# Patient Record
Sex: FEMALE | Race: WHITE | NOT HISPANIC OR LATINO | Employment: UNEMPLOYED | URBAN - METROPOLITAN AREA
[De-identification: names, ages, dates, MRNs, and addresses within clinical notes are randomized per-mention and may not be internally consistent; named-entity substitution may affect disease eponyms.]

---

## 2018-01-25 LAB — PAP RECOMMENDATION EXT: NORMAL

## 2018-12-05 ENCOUNTER — OFFICE VISIT (OUTPATIENT)
Dept: URGENT CARE | Facility: CLINIC | Age: 28
End: 2018-12-05
Payer: COMMERCIAL

## 2018-12-05 VITALS
WEIGHT: 150 LBS | SYSTOLIC BLOOD PRESSURE: 150 MMHG | BODY MASS INDEX: 26.58 KG/M2 | RESPIRATION RATE: 16 BRPM | HEIGHT: 63 IN | HEART RATE: 88 BPM | OXYGEN SATURATION: 99 % | DIASTOLIC BLOOD PRESSURE: 100 MMHG | TEMPERATURE: 98 F

## 2018-12-05 DIAGNOSIS — R19.7 NAUSEA VOMITING AND DIARRHEA: Primary | ICD-10-CM

## 2018-12-05 DIAGNOSIS — R11.2 NAUSEA VOMITING AND DIARRHEA: Primary | ICD-10-CM

## 2018-12-05 LAB
B-HCG UR QL: NEGATIVE
BILIRUB UR QL STRIP: NEGATIVE
CTP QC/QA: YES
GLUCOSE UR QL STRIP: NEGATIVE
KETONES UR QL STRIP: NEGATIVE
LEUKOCYTE ESTERASE UR QL STRIP: NEGATIVE
PH, POC UA: 8 (ref 5–8)
POC BLOOD, URINE: NEGATIVE
POC NITRATES, URINE: NEGATIVE
PROT UR QL STRIP: NEGATIVE
SP GR UR STRIP: 1.01 (ref 1–1.03)
UROBILINOGEN UR STRIP-ACNC: NORMAL (ref 0.1–1.1)

## 2018-12-05 PROCEDURE — 81003 URINALYSIS AUTO W/O SCOPE: CPT | Mod: QW,S$GLB,, | Performed by: NURSE PRACTITIONER

## 2018-12-05 PROCEDURE — 3008F BODY MASS INDEX DOCD: CPT | Mod: CPTII,S$GLB,, | Performed by: NURSE PRACTITIONER

## 2018-12-05 PROCEDURE — 99203 OFFICE O/P NEW LOW 30 MIN: CPT | Mod: 25,S$GLB,, | Performed by: NURSE PRACTITIONER

## 2018-12-05 PROCEDURE — 81025 URINE PREGNANCY TEST: CPT | Mod: S$GLB,,, | Performed by: NURSE PRACTITIONER

## 2018-12-05 RX ORDER — ONDANSETRON 8 MG/1
8 TABLET, ORALLY DISINTEGRATING ORAL
Status: COMPLETED | OUTPATIENT
Start: 2018-12-05 | End: 2018-12-05

## 2018-12-05 RX ORDER — SERTRALINE HYDROCHLORIDE 100 MG/1
25 TABLET, FILM COATED ORAL DAILY
COMMUNITY
End: 2019-06-26

## 2018-12-05 RX ORDER — ONDANSETRON 8 MG/1
8 TABLET, ORALLY DISINTEGRATING ORAL EVERY 6 HOURS PRN
Qty: 12 TABLET | Refills: 0 | Status: SHIPPED | OUTPATIENT
Start: 2018-12-05 | End: 2019-06-26

## 2018-12-05 RX ORDER — DICYCLOMINE HYDROCHLORIDE 20 MG/1
20 TABLET ORAL 3 TIMES DAILY PRN
Qty: 12 TABLET | Refills: 0 | Status: SHIPPED | OUTPATIENT
Start: 2018-12-05 | End: 2019-06-26

## 2018-12-05 RX ADMIN — ONDANSETRON 8 MG: 8 TABLET, ORALLY DISINTEGRATING ORAL at 10:12

## 2018-12-05 NOTE — PROGRESS NOTES
"Subjective:       Patient ID: Esther Romo is a 28 y.o. female.    Vitals:  height is 5' 3" (1.6 m) and weight is 68 kg (150 lb). Her tympanic temperature is 98.1 °F (36.7 °C). Her blood pressure is 150/100 (abnormal) and her pulse is 88. Her respiration is 16 and oxygen saturation is 99%.     Chief Complaint: Nausea    Patient presents with c/o nausea, vomiting, and diarrhea. Symptoms started Monday night. No blood in vomit or diarrhea. No urinary complaints. Patient with abdominal pain during bowel movements, has not taken any OTC meds for symptoms. Pt states hx of HTN, has not taken BP meds in 2 days due to nausea. Pt here requesting meds for nausea so she can take her BP meds and go back to work tomorrow. Denies headache, chest pain, SOB, weakness, changes in vision and dizziness.     + improvement in symptoms this morning per pt. She was able to drive herself here without any N/V/D or other complications.       Nausea   This is a new problem. The current episode started in the past 7 days (Monday). The problem occurs constantly. The problem has been unchanged. Associated symptoms include abdominal pain, a change in bowel habit, chills, nausea and vomiting. Pertinent negatives include no chest pain, diaphoresis, fever, myalgias, urinary symptoms, visual change or weakness. Nothing aggravates the symptoms. She has tried nothing for the symptoms. The treatment provided moderate relief.       Constitution: Positive for appetite change and chills. Negative for sweating and fever.   HENT: Negative for trouble swallowing.    Cardiovascular: Negative for chest pain.   Respiratory: Negative for shortness of breath.    Gastrointestinal: Positive for abdominal pain, nausea, vomiting and diarrhea. Negative for abdominal trauma, abdominal bloating, history of abdominal surgery, constipation, dark colored stools and heartburn.   Genitourinary: Negative for dysuria, missed menses and pelvic pain.   Musculoskeletal: " Negative for back pain and muscle ache.       Objective:      Physical Exam   Constitutional: She is oriented to person, place, and time. She appears well-developed and well-nourished.   HENT:   Head: Normocephalic and atraumatic.   Right Ear: External ear normal.   Left Ear: External ear normal.   Nose: Nose normal.   Mouth/Throat: Mucous membranes are normal.   Eyes: Conjunctivae and lids are normal.   Neck: Trachea normal and full passive range of motion without pain. Neck supple.   Cardiovascular: Normal rate, regular rhythm and normal heart sounds.   Pulmonary/Chest: Effort normal and breath sounds normal. No respiratory distress.   Abdominal: Soft. Normal appearance and bowel sounds are normal. She exhibits no distension, no abdominal bruit, no pulsatile midline mass and no mass. There is no tenderness.   Musculoskeletal: Normal range of motion. She exhibits no edema.   Neurological: She is alert and oriented to person, place, and time. She has normal strength.   Skin: Skin is warm, dry and intact. She is not diaphoretic. No pallor.   Psychiatric: She has a normal mood and affect. Her speech is normal and behavior is normal. Judgment and thought content normal. Cognition and memory are normal.   Nursing note and vitals reviewed.      Assessment:       1. Nausea vomiting and diarrhea        Plan:         Nausea vomiting and diarrhea  -     POCT Urinalysis, Dipstick, Automated, W/O Scope  -     POCT urine pregnancy  -     ondansetron disintegrating tablet 8 mg  -     ondansetron (ZOFRAN-ODT) 8 MG TbDL; Take 1 tablet (8 mg total) by mouth every 6 (six) hours as needed.  Dispense: 12 tablet; Refill: 0  -     dicyclomine (BENTYL) 20 mg tablet; Take 1 tablet (20 mg total) by mouth 3 (three) times daily as needed.  Dispense: 12 tablet; Refill: 0

## 2018-12-05 NOTE — PATIENT INSTRUCTIONS
Diet for Vomiting or Diarrhea (Adult)    Your symptoms may return or get worse after eating certain foods listed below. If this happens, stop eating these foods until your symptoms ease and you feel better.  Once the vomiting stops, follow the steps below.   During the first 12 to 24 hours  During the first 12 to 24 hours, follow this diet:  · Drinks. Plain water, sport drinks like electrolyte solutions, soft drinks without caffeine, mineral water (plain or flavored), clear fruit juices, and decaffeinated tea and coffee.  · Soups. Clear broth.  · Desserts. Plain gelatin, popsicles, and fruit juice bars. As you feel better, you may add 6 to 8 ounces of yogurt per day. If you have diarrhea, don't have foods or drinks that contain sugar, high-fructose corn syrup, or sugar alcohols.  During the next 24 hours  During the next 24 hours you may add the following to the above:  · Hot cereal, plain toast, bread, rolls, and crackers  · Plain noodles, rice, mashed potatoes, and chicken noodle or rice soup  · Unsweetened canned fruit (but not pineapple) and bananas  Don't eat more than 15 grams of fat a day. Do this by staying away from margarine, butter, oils, mayonnaise, sauces, gravies, fried foods, peanut butter, meat, poultry, and fish.  Don't eat much fiber. Stay away from raw or cooked vegetables, fresh fruits (except bananas), and bran cereals.  Limit how much caffeine and chocolate you have. Do not use any spices or seasonings except salt.  During the next 24 hours  Slowly go back to your normal diet, as you feel better and your symptoms ease.  Date Last Reviewed: 8/1/2016  © 0701-9028 [employer] [address] [place] [address], [place] [address]. All rights reserved. This information is not intended as a substitute for professional medical care. Always follow your healthcare professional's instructions.

## 2018-12-05 NOTE — LETTER
December 5, 2018      [facility]  [address] [address]  Phone: [phone]  Fax: [phone]       Patient: Esther Romo   YOB: 1990  Date of Visit: 12/05/2018    To Whom It May Concern:    Aleta Romo  was at [facility] on 12/05/2018. Please note that her illness started on Monday, December 4, 2018. She may return to work/school on 12/06/18 without restrictions. If you have any questions or concerns, or if I can be of further assistance, please do not hesitate to contact me.    Sincerely,    Yamilet DODD MA

## 2019-04-07 ENCOUNTER — HOSPITAL ENCOUNTER (EMERGENCY)
Facility: OTHER | Age: 29
Discharge: HOME | End: 2019-04-08
Attending: EMERGENCY MEDICINE

## 2019-04-07 DIAGNOSIS — S00.83XA FACIAL CONTUSION, INITIAL ENCOUNTER: Primary | ICD-10-CM

## 2019-04-07 LAB
B-HCG UR QL: NEGATIVE
CTP QC/QA: YES

## 2019-04-07 PROCEDURE — 81025 URINE PREGNANCY TEST: CPT | Performed by: PHYSICIAN ASSISTANT

## 2019-04-07 PROCEDURE — 99284 EMERGENCY DEPT VISIT MOD MDM: CPT | Mod: 25

## 2019-04-08 VITALS
TEMPERATURE: 98 F | RESPIRATION RATE: 16 BRPM | OXYGEN SATURATION: 98 % | WEIGHT: 150 LBS | BODY MASS INDEX: 26.58 KG/M2 | SYSTOLIC BLOOD PRESSURE: 136 MMHG | HEIGHT: 63 IN | HEART RATE: 106 BPM | DIASTOLIC BLOOD PRESSURE: 84 MMHG
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
## 2019-05-23 ENCOUNTER — HOSPITAL ENCOUNTER (EMERGENCY)
Facility: HOSPITAL | Age: 29
Discharge: HOME | End: 2019-05-24
Attending: FAMILY MEDICINE

## 2019-05-23 DIAGNOSIS — F10.930 ALCOHOL WITHDRAWAL SYNDROME WITHOUT COMPLICATION: Primary | ICD-10-CM

## 2019-05-23 DIAGNOSIS — R00.0 TACHYCARDIA: ICD-10-CM

## 2019-05-23 LAB
ALBUMIN SERPL BCP-MCNC: 4.2 G/DL (ref 3.5–5.2)
ALP SERPL-CCNC: 76 U/L (ref 55–135)
ALT SERPL W/O P-5'-P-CCNC: 55 U/L (ref 10–44)
ANION GAP SERPL CALC-SCNC: 18 MMOL/L (ref 8–16)
AST SERPL-CCNC: 85 U/L (ref 10–40)
BASOPHILS # BLD AUTO: 0.02 K/UL (ref 0–0.2)
BASOPHILS NFR BLD: 0.4 % (ref 0–1.9)
BILIRUB SERPL-MCNC: 1 MG/DL (ref 0.1–1)
BUN SERPL-MCNC: 8 MG/DL (ref 6–20)
CALCIUM SERPL-MCNC: 9.3 MG/DL (ref 8.7–10.5)
CHLORIDE SERPL-SCNC: 97 MMOL/L (ref 95–110)
CO2 SERPL-SCNC: 20 MMOL/L (ref 23–29)
CREAT SERPL-MCNC: 0.7 MG/DL (ref 0.5–1.4)
DIFFERENTIAL METHOD: ABNORMAL
EOSINOPHIL # BLD AUTO: 0 K/UL (ref 0–0.5)
EOSINOPHIL NFR BLD: 0.2 % (ref 0–8)
ERYTHROCYTE [DISTWIDTH] IN BLOOD BY AUTOMATED COUNT: 14.6 % (ref 11.5–14.5)
EST. GFR  (AFRICAN AMERICAN): >60 ML/MIN/1.73 M^2
EST. GFR  (NON AFRICAN AMERICAN): >60 ML/MIN/1.73 M^2
ETHANOL SERPL-MCNC: 118 MG/DL
GLUCOSE SERPL-MCNC: 98 MG/DL (ref 70–110)
HCT VFR BLD AUTO: 37.7 % (ref 37–48.5)
HGB BLD-MCNC: 13.2 G/DL (ref 12–16)
LIPASE SERPL-CCNC: 49 U/L (ref 4–60)
LYMPHOCYTES # BLD AUTO: 0.8 K/UL (ref 1–4.8)
LYMPHOCYTES NFR BLD: 14.3 % (ref 18–48)
MAGNESIUM SERPL-MCNC: 1.4 MG/DL (ref 1.6–2.6)
MCH RBC QN AUTO: 30.8 PG (ref 27–31)
MCHC RBC AUTO-ENTMCNC: 35 G/DL (ref 32–36)
MCV RBC AUTO: 88 FL (ref 82–98)
MONOCYTES # BLD AUTO: 0.5 K/UL (ref 0.3–1)
MONOCYTES NFR BLD: 9.5 % (ref 4–15)
NEUTROPHILS # BLD AUTO: 4.1 K/UL (ref 1.8–7.7)
NEUTROPHILS NFR BLD: 75.6 % (ref 38–73)
PLATELET # BLD AUTO: 142 K/UL (ref 150–350)
PMV BLD AUTO: 9.6 FL (ref 9.2–12.9)
POTASSIUM SERPL-SCNC: 3.4 MMOL/L (ref 3.5–5.1)
PROT SERPL-MCNC: 7.8 G/DL (ref 6–8.4)
RBC # BLD AUTO: 4.28 M/UL (ref 4–5.4)
SODIUM SERPL-SCNC: 135 MMOL/L (ref 136–145)
WBC # BLD AUTO: 5.47 K/UL (ref 3.9–12.7)

## 2019-05-23 PROCEDURE — 96375 TX/PRO/DX INJ NEW DRUG ADDON: CPT

## 2019-05-23 PROCEDURE — 93010 EKG 12-LEAD: ICD-10-PCS | Mod: ,,, | Performed by: INTERNAL MEDICINE

## 2019-05-23 PROCEDURE — 86703 HIV-1/HIV-2 1 RESULT ANTBDY: CPT

## 2019-05-23 PROCEDURE — 93005 ELECTROCARDIOGRAM TRACING: CPT

## 2019-05-23 PROCEDURE — 83690 ASSAY OF LIPASE: CPT

## 2019-05-23 PROCEDURE — 80307 DRUG TEST PRSMV CHEM ANLYZR: CPT

## 2019-05-23 PROCEDURE — 63600175 PHARM REV CODE 636 W HCPCS: Performed by: FAMILY MEDICINE

## 2019-05-23 PROCEDURE — 96376 TX/PRO/DX INJ SAME DRUG ADON: CPT

## 2019-05-23 PROCEDURE — 99291 CRITICAL CARE FIRST HOUR: CPT

## 2019-05-23 PROCEDURE — 83735 ASSAY OF MAGNESIUM: CPT

## 2019-05-23 PROCEDURE — 80320 DRUG SCREEN QUANTALCOHOLS: CPT

## 2019-05-23 PROCEDURE — 80053 COMPREHEN METABOLIC PANEL: CPT

## 2019-05-23 PROCEDURE — 84484 ASSAY OF TROPONIN QUANT: CPT

## 2019-05-23 PROCEDURE — 85025 COMPLETE CBC W/AUTO DIFF WBC: CPT

## 2019-05-23 PROCEDURE — 81000 URINALYSIS NONAUTO W/SCOPE: CPT | Mod: 59

## 2019-05-23 PROCEDURE — 96361 HYDRATE IV INFUSION ADD-ON: CPT

## 2019-05-23 PROCEDURE — 93010 ELECTROCARDIOGRAM REPORT: CPT | Mod: ,,, | Performed by: INTERNAL MEDICINE

## 2019-05-23 PROCEDURE — 25000003 PHARM REV CODE 250: Performed by: FAMILY MEDICINE

## 2019-05-23 RX ORDER — THIAMINE HCL 100 MG
100 TABLET ORAL ONCE
Status: COMPLETED | OUTPATIENT
Start: 2019-05-23 | End: 2019-05-23

## 2019-05-23 RX ORDER — THIAMINE HCL 100 MG
100 TABLET ORAL DAILY
Status: DISCONTINUED | OUTPATIENT
Start: 2019-05-24 | End: 2019-05-23

## 2019-05-23 RX ORDER — ONDANSETRON 2 MG/ML
8 INJECTION INTRAMUSCULAR; INTRAVENOUS
Status: COMPLETED | OUTPATIENT
Start: 2019-05-23 | End: 2019-05-23

## 2019-05-23 RX ORDER — SODIUM CHLORIDE 9 MG/ML
1000 INJECTION, SOLUTION INTRAVENOUS
Status: COMPLETED | OUTPATIENT
Start: 2019-05-23 | End: 2019-05-24

## 2019-05-23 RX ADMIN — LORAZEPAM 1 MG: 2 INJECTION INTRAMUSCULAR; INTRAVENOUS at 10:05

## 2019-05-23 RX ADMIN — LORAZEPAM 1 MG: 2 INJECTION INTRAMUSCULAR; INTRAVENOUS at 11:05

## 2019-05-23 RX ADMIN — Medication 100 MG: at 10:05

## 2019-05-23 RX ADMIN — SODIUM CHLORIDE 1000 ML: 0.9 INJECTION, SOLUTION INTRAVENOUS at 10:05

## 2019-05-23 RX ADMIN — ONDANSETRON 8 MG: 2 INJECTION INTRAMUSCULAR; INTRAVENOUS at 11:05

## 2019-05-24 VITALS
HEIGHT: 63 IN | SYSTOLIC BLOOD PRESSURE: 134 MMHG | OXYGEN SATURATION: 95 % | TEMPERATURE: 99 F | RESPIRATION RATE: 18 BRPM | WEIGHT: 162.5 LBS | HEART RATE: 122 BPM | BODY MASS INDEX: 28.79 KG/M2 | DIASTOLIC BLOOD PRESSURE: 69 MMHG

## 2019-05-24 LAB
AMPHET+METHAMPHET UR QL: NEGATIVE
BACTERIA #/AREA URNS HPF: ABNORMAL /HPF
BARBITURATES UR QL SCN>200 NG/ML: NEGATIVE
BENZODIAZ UR QL SCN>200 NG/ML: NEGATIVE
BILIRUB UR QL STRIP: NEGATIVE
BZE UR QL SCN: NEGATIVE
CANNABINOIDS UR QL SCN: NEGATIVE
CLARITY UR: CLEAR
COLOR UR: YELLOW
CREAT UR-MCNC: 82.4 MG/DL (ref 15–325)
GLUCOSE UR QL STRIP: NEGATIVE
HGB UR QL STRIP: ABNORMAL
HIV 1+2 AB+HIV1 P24 AG SERPL QL IA: NEGATIVE
HYALINE CASTS #/AREA URNS LPF: 0 /LPF
KETONES UR QL STRIP: ABNORMAL
LEUKOCYTE ESTERASE UR QL STRIP: NEGATIVE
METHADONE UR QL SCN>300 NG/ML: NEGATIVE
MICROSCOPIC COMMENT: ABNORMAL
NITRITE UR QL STRIP: NEGATIVE
OPIATES UR QL SCN: NEGATIVE
PCP UR QL SCN>25 NG/ML: NEGATIVE
PH UR STRIP: 7 [PH] (ref 5–8)
PROT UR QL STRIP: ABNORMAL
RBC #/AREA URNS HPF: 5 /HPF (ref 0–4)
SP GR UR STRIP: 1.01 (ref 1–1.03)
TOXICOLOGY INFORMATION: NORMAL
TROPONIN I SERPL DL<=0.01 NG/ML-MCNC: 0.01 NG/ML (ref 0–0.03)
URN SPEC COLLECT METH UR: ABNORMAL
UROBILINOGEN UR STRIP-ACNC: NEGATIVE EU/DL
WBC #/AREA URNS HPF: 1 /HPF (ref 0–5)

## 2019-05-24 PROCEDURE — 63600175 PHARM REV CODE 636 W HCPCS: Performed by: FAMILY MEDICINE

## 2019-05-24 PROCEDURE — 25000003 PHARM REV CODE 250: Performed by: FAMILY MEDICINE

## 2019-05-24 PROCEDURE — 96376 TX/PRO/DX INJ SAME DRUG ADON: CPT

## 2019-05-24 PROCEDURE — 96365 THER/PROPH/DIAG IV INF INIT: CPT

## 2019-05-24 RX ORDER — MAGNESIUM SULFATE HEPTAHYDRATE 40 MG/ML
2 INJECTION, SOLUTION INTRAVENOUS
Status: COMPLETED | OUTPATIENT
Start: 2019-05-24 | End: 2019-05-24

## 2019-05-24 RX ORDER — ONDANSETRON 4 MG/1
4 TABLET, FILM COATED ORAL EVERY 6 HOURS
Qty: 28 TABLET | Refills: 0 | Status: SHIPPED | OUTPATIENT
Start: 2019-05-24 | End: 2019-05-31

## 2019-05-24 RX ORDER — PANTOPRAZOLE SODIUM 40 MG/10ML
80 INJECTION, POWDER, LYOPHILIZED, FOR SOLUTION INTRAVENOUS ONCE
Status: DISCONTINUED | OUTPATIENT
Start: 2019-05-24 | End: 2019-05-24

## 2019-05-24 RX ORDER — CHLORDIAZEPOXIDE HYDROCHLORIDE 10 MG/1
CAPSULE, GELATIN COATED ORAL
Qty: 36 CAPSULE | Refills: 0 | Status: ON HOLD | OUTPATIENT
Start: 2019-05-24 | End: 2019-06-27 | Stop reason: ALTCHOICE

## 2019-05-24 RX ADMIN — LORAZEPAM 2 MG: 2 INJECTION INTRAMUSCULAR; INTRAVENOUS at 12:05

## 2019-05-24 RX ADMIN — PROMETHAZINE HYDROCHLORIDE 25 MG: 25 INJECTION INTRAMUSCULAR; INTRAVENOUS at 12:05

## 2019-05-24 RX ADMIN — LORAZEPAM 2 MG: 2 INJECTION INTRAMUSCULAR; INTRAVENOUS at 02:05

## 2019-05-24 RX ADMIN — MAGNESIUM SULFATE 2 G: 2 INJECTION INTRAVENOUS at 12:05

## 2019-05-24 NOTE — ED PROVIDER NOTES
"SCRIBE #1 NOTE: I, Akiko Lucia, am scribing for, and in the presence of, Hoa Granados MD. I have scribed the entire note.       History     Chief Complaint   Patient presents with    Withdrawal     from alcohol. c/o fast heart beat, abdominal pain, N/V. last drink 12 hrs ago.      Review of patient's allergies indicates:  No Known Allergies      History of Present Illness     HPI    5/23/2019, 10:20 PM  History obtained from the patient      History of Present Illness: Esther Romo is a 29 y.o. female patient with a PMHx of alcohol abuse and HTN who presents to the Emergency Department for evaluation of alcohol withdrawal which onset gradually x1 week ago. Pt states that for the last week she has been trying to drink less alcohol. She reports drinking 3/4 of a fifth of whiskey every day of this week except today. Pt reports that her last drink was 12 hours and she had "one swig" of whiskey.  Pt notes that she has experienced withdrawal in the past. She notes that she normally "chokes down a bottle of liquor" to rid the sxs, but decided to come to the ED for this episode. This is pt's first time to ED for sxs. Symptoms are constant and moderate in severity. No mitigating or exacerbating factors reported. Associated sxs include diaphoresis, palpitations, tremors, n/v and abd pain. Patient denies any leg swelling, SI, HI, hallucinations, seizures, fever, chills, numbness, weakness, and all other sxs at this time. No prior tx reportedNo further complaints or concerns at this time.       Arrival mode: Personal vehicle     PCP: Primary Doctor No        Past Medical History:  Past Medical History:   Diagnosis Date    Anxiety     Hypertension     Miscarriage        Past Surgical History:  Past Surgical History:   Procedure Laterality Date    BREAST LUMPECTOMY           Family History:  Family History   Problem Relation Age of Onset    No Known Problems Mother     No Known Problems Father        Social " "History:  Social History     Tobacco Use    Smoking status: Current Every Day Smoker     Packs/day: 0.50     Types: Cigarettes    Smokeless tobacco: Never Used   Substance and Sexual Activity    Alcohol use: Yes     Comment: "I'm an alcoholic"    Drug use: Yes     Types: Cocaine, Marijuana     Comment: ocassionally    Sexual activity: N/A        Review of Systems     Review of Systems   Constitutional: Positive for diaphoresis. Negative for chills and fever.   HENT: Negative for sore throat.    Respiratory: Positive for shortness of breath.    Cardiovascular: Positive for chest pain and palpitations. Negative for leg swelling.   Gastrointestinal: Positive for abdominal pain, nausea and vomiting.   Genitourinary: Negative for dysuria.   Musculoskeletal: Negative for back pain.   Skin: Negative for rash.   Neurological: Positive for tremors. Negative for seizures, weakness and numbness.             Hematological: Does not bruise/bleed easily.   Psychiatric/Behavioral: Negative for hallucinations and suicidal ideas.        (+) alcohol withdrawal   (+) alcohol abuse   (-) HI    All other systems reviewed and are negative.     Physical Exam     Initial Vitals [05/23/19 2039]   BP Pulse Resp Temp SpO2   125/74 (!) 126 18 98.6 °F (37 °C) 97 %      MAP       --          Physical Exam  Nursing Notes and Vital Signs Reviewed.  Constitutional: Patient is in mild distress. Visibly trembling. Diaphoretic. Well-developed and well-nourished.  Head: Atraumatic. Normocephalic.  Eyes: PERRL. EOM intact. Conjunctivae are not pale. No scleral icterus.  ENT: Mucous membranes are moist. Oropharynx is clear and symmetric.    Neck: Supple. Full ROM. No lymphadenopathy.  Cardiovascular: Tachycardic. Regular rhythm. No murmurs, rubs, or gallops. Distal pulses are 2+ and symmetric.  Pulmonary/Chest: No respiratory distress. Clear to auscultation bilaterally. No wheezing or rales.  Abdominal: Soft and non-distended.  There is no " "tenderness.  No rebound, guarding, or rigidity. Good bowel sounds.  Genitourinary: No CVA tenderness  Musculoskeletal: Moves all extremities. No obvious deformities. No edema. No calf tenderness.  Skin: Warm and dry.  Neurological:  AAOx3.  Normal speech.  No acute focal neurological deficits are appreciated.  Psychiatric: Normal affect. Good eye contact. Appropriate in content.     ED Course   Critical Care  Date/Time: 5/24/2019 12:22 AM  Performed by: Hoa Granados MD  Authorized by: Hoa Granados MD   Direct patient critical care time: 15 minutes  Additional history critical care time: 10 minutes  Ordering / reviewing critical care time: 10 minutes  Documentation critical care time: 10 minutes  Total critical care time (exclusive of procedural time) : 45 minutes  Critical care time was exclusive of separately billable procedures and treating other patients and teaching time.  Critical care was necessary to treat or prevent imminent or life-threatening deterioration of the following conditions: alcohol withdrawal   Critical care was time spent personally by me on the following activities: blood draw for specimens, development of treatment plan with patient or surrogate, discussions with consultants, interpretation of cardiac output measurements, evaluation of patient's response to treatment, examination of patient, obtaining history from patient or surrogate, ordering and performing treatments and interventions, ordering and review of laboratory studies, pulse oximetry, re-evaluation of patient's condition and review of old charts.        ED Vital Signs:  Vitals:    05/23/19 2039 05/23/19 2142 05/23/19 2143 05/23/19 2242   BP: 125/74 139/74  137/75   Pulse: (!) 126  (!) 120 (!) 113   Resp: 18   19   Temp: 98.6 °F (37 °C)      TempSrc: Oral      SpO2: 97%  100% 100%   Weight: 73.7 kg (162 lb 7.7 oz)      Height: 5' 3" (1.6 m)       05/24/19 0002 05/24/19 0039 05/24/19 0113 05/24/19 0157   BP: 127/75 133/88 " 134/78    Pulse: (!) 114 (!) 135 (!) 114 (!) 118   Resp: 19 20 19   Temp:       TempSrc:       SpO2: 98% 99% 95% 95%   Weight:       Height:        05/24/19 0200 05/24/19 0228   BP: 134/73 134/69   Pulse: (!) 116 (!) 122   Resp: 19 18   Temp: 98.5 °F (36.9 °C) 98.5 °F (36.9 °C)   TempSrc: Oral Oral   SpO2: 95% 95%   Weight:     Height:         Abnormal Lab Results:  Labs Reviewed   CBC W/ AUTO DIFFERENTIAL - Abnormal; Notable for the following components:       Result Value    RDW 14.6 (*)     Platelets 142 (*)     Lymph # 0.8 (*)     Gran% 75.6 (*)     Lymph% 14.3 (*)     All other components within normal limits   COMPREHENSIVE METABOLIC PANEL - Abnormal; Notable for the following components:    Sodium 135 (*)     Potassium 3.4 (*)     CO2 20 (*)     AST 85 (*)     ALT 55 (*)     Anion Gap 18 (*)     All other components within normal limits   MAGNESIUM - Abnormal; Notable for the following components:    Magnesium 1.4 (*)     All other components within normal limits   URINALYSIS, REFLEX TO URINE CULTURE - Abnormal; Notable for the following components:    Protein, UA 1+ (*)     Ketones, UA 1+ (*)     Occult Blood UA 3+ (*)     All other components within normal limits    Narrative:     Preferred Collection Type->Urine, Clean Catch   ALCOHOL,MEDICAL (ETHANOL) - Abnormal; Notable for the following components:    Alcohol, Medical, Serum 118 (*)     All other components within normal limits   URINALYSIS MICROSCOPIC - Abnormal; Notable for the following components:    RBC, UA 5 (*)     All other components within normal limits    Narrative:     Preferred Collection Type->Urine, Clean Catch   HIV 1 / 2 ANTIBODY   DRUG SCREEN PANEL, URINE EMERGENCY    Narrative:     Preferred Collection Type->Urine, Clean Catch   LIPASE   TROPONIN I   TROPONIN I        All Lab Results:  Results for orders placed or performed during the hospital encounter of 05/23/19   HIV 1/2 Ag/Ab (4th Gen)   Result Value Ref Range    HIV 1/2 Ag/Ab  Negative Negative   CBC auto differential   Result Value Ref Range    WBC 5.47 3.90 - 12.70 K/uL    RBC 4.28 4.00 - 5.40 M/uL    Hemoglobin 13.2 12.0 - 16.0 g/dL    Hematocrit 37.7 37.0 - 48.5 %    Mean Corpuscular Volume 88 82 - 98 fL    Mean Corpuscular Hemoglobin 30.8 27.0 - 31.0 pg    Mean Corpuscular Hemoglobin Conc 35.0 32.0 - 36.0 g/dL    RDW 14.6 (H) 11.5 - 14.5 %    Platelets 142 (L) 150 - 350 K/uL    MPV 9.6 9.2 - 12.9 fL    Gran # (ANC) 4.1 1.8 - 7.7 K/uL    Lymph # 0.8 (L) 1.0 - 4.8 K/uL    Mono # 0.5 0.3 - 1.0 K/uL    Eos # 0.0 0.0 - 0.5 K/uL    Baso # 0.02 0.00 - 0.20 K/uL    Gran% 75.6 (H) 38.0 - 73.0 %    Lymph% 14.3 (L) 18.0 - 48.0 %    Mono% 9.5 4.0 - 15.0 %    Eosinophil% 0.2 0.0 - 8.0 %    Basophil% 0.4 0.0 - 1.9 %    Differential Method Automated    Comprehensive metabolic panel   Result Value Ref Range    Sodium 135 (L) 136 - 145 mmol/L    Potassium 3.4 (L) 3.5 - 5.1 mmol/L    Chloride 97 95 - 110 mmol/L    CO2 20 (L) 23 - 29 mmol/L    Glucose 98 70 - 110 mg/dL    BUN, Bld 8 6 - 20 mg/dL    Creatinine 0.7 0.5 - 1.4 mg/dL    Calcium 9.3 8.7 - 10.5 mg/dL    Total Protein 7.8 6.0 - 8.4 g/dL    Albumin 4.2 3.5 - 5.2 g/dL    Total Bilirubin 1.0 0.1 - 1.0 mg/dL    Alkaline Phosphatase 76 55 - 135 U/L    AST 85 (H) 10 - 40 U/L    ALT 55 (H) 10 - 44 U/L    Anion Gap 18 (H) 8 - 16 mmol/L    eGFR if African American >60 >60 mL/min/1.73 m^2    eGFR if non African American >60 >60 mL/min/1.73 m^2   Drug screen panel, emergency   Result Value Ref Range    Benzodiazepines Negative     Methadone metabolites Negative     Cocaine (Metab.) Negative     Opiate Scrn, Ur Negative     Barbiturate Screen, Ur Negative     Amphetamine Screen, Ur Negative     THC Negative     Phencyclidine Negative     Creatinine, Random Ur 82.4 15.0 - 325.0 mg/dL    Toxicology Information SEE COMMENT    Magnesium   Result Value Ref Range    Magnesium 1.4 (L) 1.6 - 2.6 mg/dL   Urinalysis, Reflex to Urine Culture Urine, Clean Catch    Result Value Ref Range    Specimen UA Urine, Clean Catch     Color, UA Yellow Yellow, Straw, Rose Mary    Appearance, UA Clear Clear    pH, UA 7.0 5.0 - 8.0    Specific Gravity, UA 1.015 1.005 - 1.030    Protein, UA 1+ (A) Negative    Glucose, UA Negative Negative    Ketones, UA 1+ (A) Negative    Bilirubin (UA) Negative Negative    Occult Blood UA 3+ (A) Negative    Nitrite, UA Negative Negative    Urobilinogen, UA Negative <2.0 EU/dL    Leukocytes, UA Negative Negative   Ethanol   Result Value Ref Range    Alcohol, Medical, Serum 118 (H) <10 mg/dL   Lipase   Result Value Ref Range    Lipase 49 4 - 60 U/L   Urinalysis Microscopic   Result Value Ref Range    RBC, UA 5 (H) 0 - 4 /hpf    WBC, UA 1 0 - 5 /hpf    Bacteria Rare None-Occ /hpf    Hyaline Casts, UA 0 0-1/lpf /lpf    Microscopic Comment SEE COMMENT    Troponin I   Result Value Ref Range    Troponin I 0.013 0.000 - 0.026 ng/mL         Imaging Results:  Imaging Results    None          The EKG was ordered, reviewed, and independently interpreted by the ED provider.  Interpretation time: 2158  Rate: 111 BPM  Rhythm: sinus tachycardia  Interpretation: Nonspecific T wave abnormality. No STEMI.           ED Course as of May 25 1057   Fri May 24, 2019   0022 Re-evaluation patient:  Patient's tachycardia is improving.  Patient has received 2 doses of Ativan.  At patient's family is at bedside have discussed option and offered admission versus being discharged home with a Librium taper.  Patient states that she has taken that before and was prescribed that by her primary care physician and did well.  Patient's mother and father bedside state that they will be watch her so that she does not sees or if any complications occur will bring her back to emergency department.  This time I do deemed patient stable for discharge and chest that family will take care of her at home with withdrawal and symptomatic treatment with Librium.  Patient family at bedside verbalized  understanding an R ready for discharge after electrolyte replacements and another dose abdomen.    [DANIKA]   0222 Re-evaluation:  Patient is not diaphoretic and is looking much better.  Patient states that she feels much better and is ready for discharge. I have discussed that her heart rate will most likely be elevated for some time however as she withdraws more and more and is feeling better it will slowly decreased.  Patient verbalized understanding    [DANIKA]      ED Course User Index  [DANIKA] Hoa Granados MD       The Emergency Provider reviewed the vital signs and test results, which are outlined above.     ED Discussion     2:29AM: Reassessed pt at this time.  Pt states her condition has improved at this time. Discussed with pt all pertinent ED information and results. Discussed pt dx and plan of tx. Gave pt all f/u and return to the ED instructions. All questions and concerns were addressed at this time. Pt expresses understanding of information and instructions, and is comfortable with plan to discharge. Pt is stable for discharge.       Medication List      START taking these medications    chlordiazepoxide 10 MG capsule  Commonly known as:  LIBRIUM  Take 8 capsules (80 mg total) by mouth 3 (three) times daily as needed for 1 day, THEN 7 capsules (70 mg total) 3 (three) times daily as needed for 1 day, THEN 6 capsules (60 mg total) 3 (three) times daily as needed for 1 day, THEN 5 capsules (50 mg total) 3 (three) times daily as needed for 1 day, THEN 4 capsules (40 mg total) 3 (three) times daily as needed for 1 day, THEN 3 capsules (30 mg total) 3 (three) times daily as needed for 1 day, THEN 2 capsules (20 mg total) 3 (three) times daily as needed for 1 day, THEN 1 capsule (10 mg total) 3 (three) times daily as needed.  Start taking on:  5/24/2019     ondansetron 4 MG tablet  Commonly known as:  ZOFRAN  Take 1 tablet (4 mg total) by mouth every 6 (six) hours. for 7 days        CONTINUE taking these medications  "   AMLODIPINE BESYLATE (BULK) MISC     dicyclomine 20 mg tablet  Commonly known as:  BENTYL  Take 1 tablet (20 mg total) by mouth 3 (three) times daily as needed.     ondansetron 8 MG Tbdl  Commonly known as:  ZOFRAN-ODT  Take 1 tablet (8 mg total) by mouth every 6 (six) hours as needed.     PARAGARD T 380A IU     sertraline 100 MG tablet  Commonly known as:  ZOLOFT           Where to Get Your Medications      You can get these medications from any pharmacy    Bring a paper prescription for each of these medications  · chlordiazepoxide 10 MG capsule  · ondansetron 4 MG tablet         I discussed with patient and/or family/caretaker that evaluation in the ED does not suggest any emergent or life threatening medical conditions requiring immediate intervention beyond what was provided in the ED, and I believe patient is safe for discharge.  Regardless, an unremarkable evaluation in the ED does not preclude the development or presence of a serious of life threatening condition. As such, patient was instructed to return immediately for any worsening or change in current symptoms.    Portions of this note may have been created with voice recognition software. Occasional "wrong-word" or "sound-a-like" substitutions may have occurred due to the inherent limitations of voice recognition software. Please, read the note carefully and recognize, using context, where substitutions have occurred.       ED Medication(s):  Medications   0.9%  NaCl infusion (0 mLs Intravenous Stopped 5/24/19 0215)   thiamine tablet 100 mg (100 mg Oral Given 5/23/19 2209)   lorazepam (ATIVAN) injection 1 mg (1 mg Intravenous Given 5/23/19 2240)   lorazepam (ATIVAN) injection 1 mg (1 mg Intravenous Given 5/23/19 2349)   ondansetron injection 8 mg (8 mg Intravenous Given 5/23/19 2349)   magnesium sulfate 2g in water 50mL IVPB (premix) (0 g Intravenous Stopped 5/24/19 0224)   lorazepam (ATIVAN) injection 2 mg (2 mg Intravenous Given 5/24/19 0030) "   lorazepam (ATIVAN) injection 2 mg (2 mg Intravenous Given 5/24/19 0047)   promethazine (PHENERGAN) 25 mg in dextrose 5 % 50 mL IVPB (0 mg Intravenous Stopped 5/24/19 0116)   lorazepam (ATIVAN) injection 2 mg (2 mg Intravenous Given 5/24/19 0203)       Discharge Medication List as of 5/24/2019  2:33 AM      START taking these medications    Details   chlordiazepoxide (LIBRIUM) 10 MG capsule Multiple Dosages:Starting Fri 5/24/2019, Until Fri 5/24/2019, THEN Starting Sat 5/25/2019, Until Sat 5/25/2019, THEN Starting Sun 5/26/2019, Until Sun 5/26/2019, THEN Starting Mon 5/27/2019, Until Mon 5/27/2019, THEN Starting Tue 5/28/2019, Until Tu e 5/28/2019, THEN Starting Wed 5/29/2019, Until Wed 5/29/2019, THEN Starting Thu 5/30/2019, Until Thu 5/30/2019, THEN Starting Fri 5/31/2019, Until Fri 5/31/2019Take 8 capsules (80 mg total) by mouth 3 (three) times daily as needed for 1 day, THEN 7  capsules (70 mg total) 3 (three) times daily as needed for 1 day, THEN 6 capsules (60 mg total) 3 (three) times daily as needed for 1 day, THEN 5 capsules (50 mg total) 3 (three) times daily as needed for 1 day, THEN 4 capsules (40 mg total) 3 (three) ti mes daily as needed for 1 day, THEN 3 capsules (30 mg total) 3 (three) times daily as needed for 1 day, THEN 2 capsules (20 mg total) 3 (three) times daily as needed for 1 day, THEN 1 capsule (10 mg total) 3 (three) times daily as needed., Print      ondansetron (ZOFRAN) 4 MG tablet Take 1 tablet (4 mg total) by mouth every 6 (six) hours. for 7 days, Starting Fri 5/24/2019, Until Fri 5/31/2019, Print             Follow-up Information     [facility]. Schedule an appointment as soon as possible for a visit in 3 days.    Contact information:  [address] [facility] [address] [phone]                         Medical Decision Making:   Clinical Tests:   Lab Tests: Reviewed and Ordered  Medical Tests: Reviewed and Ordered             Scribe Attestation:   Scribe #1:  I performed the above scribed service and the documentation accurately describes the services I performed. I attest to the accuracy of the note.     Attending:   Physician Attestation Statement for Scribe #1: I, Hoa Granados MD, personally performed the services described in this documentation, as scribed by Akiko Myers, in my presence, and it is both accurate and complete.           Clinical Impression       ICD-10-CM ICD-9-CM   1. Alcohol withdrawal syndrome without complication F10.230 291.81   2. Tachycardia R00.0 785.0       Disposition:   Disposition: Discharged  Condition: Stable       Hoa Granados MD  05/25/19 1057

## 2019-06-26 ENCOUNTER — HOSPITAL ENCOUNTER (INPATIENT)
Facility: OTHER | Age: 29
LOS: 2 days | Discharge: HOME | DRG: 897 | End: 2019-06-28
Attending: EMERGENCY MEDICINE | Admitting: INTERNAL MEDICINE

## 2019-06-26 DIAGNOSIS — F10.930 ALCOHOL WITHDRAWAL SYNDROME WITHOUT COMPLICATION: Primary | ICD-10-CM

## 2019-06-26 DIAGNOSIS — F10.939 WITHDRAWAL SYMPTOMS, ALCOHOL: ICD-10-CM

## 2019-06-26 DIAGNOSIS — F10.939 ALCOHOL WITHDRAWAL: ICD-10-CM

## 2019-06-26 DIAGNOSIS — R00.0 TACHYCARDIA: ICD-10-CM

## 2019-06-26 LAB
ALBUMIN SERPL BCP-MCNC: 4.3 G/DL (ref 3.5–5.2)
ALP SERPL-CCNC: 70 U/L (ref 55–135)
ALT SERPL W/O P-5'-P-CCNC: 68 U/L (ref 10–44)
ANION GAP SERPL CALC-SCNC: 20 MMOL/L (ref 8–16)
ANISOCYTOSIS BLD QL SMEAR: SLIGHT
AST SERPL-CCNC: 84 U/L (ref 10–40)
B-HCG UR QL: NEGATIVE
BACTERIA #/AREA URNS HPF: ABNORMAL /HPF
BASOPHILS # BLD AUTO: ABNORMAL K/UL (ref 0–0.2)
BASOPHILS NFR BLD: 0 % (ref 0–1.9)
BILIRUB SERPL-MCNC: 1 MG/DL (ref 0.1–1)
BILIRUB UR QL STRIP: NEGATIVE
BUN SERPL-MCNC: 6 MG/DL (ref 6–20)
CALCIUM SERPL-MCNC: 9 MG/DL (ref 8.7–10.5)
CHLORIDE SERPL-SCNC: 99 MMOL/L (ref 95–110)
CLARITY UR: CLEAR
CO2 SERPL-SCNC: 19 MMOL/L (ref 23–29)
COLOR UR: YELLOW
CREAT SERPL-MCNC: 0.7 MG/DL (ref 0.5–1.4)
CTP QC/QA: YES
DIFFERENTIAL METHOD: ABNORMAL
EOSINOPHIL # BLD AUTO: ABNORMAL K/UL (ref 0–0.5)
EOSINOPHIL NFR BLD: 0 % (ref 0–8)
ERYTHROCYTE [DISTWIDTH] IN BLOOD BY AUTOMATED COUNT: 14.4 % (ref 11.5–14.5)
EST. GFR  (AFRICAN AMERICAN): >60 ML/MIN/1.73 M^2
EST. GFR  (NON AFRICAN AMERICAN): >60 ML/MIN/1.73 M^2
GLUCOSE SERPL-MCNC: 94 MG/DL (ref 70–110)
GLUCOSE UR QL STRIP: NEGATIVE
HCT VFR BLD AUTO: 43.2 % (ref 37–48.5)
HGB BLD-MCNC: 14.8 G/DL (ref 12–16)
HGB UR QL STRIP: ABNORMAL
HYALINE CASTS #/AREA URNS LPF: 0 /LPF
INR PPP: 0.9 (ref 0.8–1.2)
KETONES UR QL STRIP: ABNORMAL
LEUKOCYTE ESTERASE UR QL STRIP: ABNORMAL
LIPASE SERPL-CCNC: 41 U/L (ref 4–60)
LYMPHOCYTES # BLD AUTO: ABNORMAL K/UL (ref 1–4.8)
LYMPHOCYTES NFR BLD: 13 % (ref 18–48)
MCH RBC QN AUTO: 30.9 PG (ref 27–31)
MCHC RBC AUTO-ENTMCNC: 34.3 G/DL (ref 32–36)
MCV RBC AUTO: 90 FL (ref 82–98)
MICROSCOPIC COMMENT: ABNORMAL
MONOCYTES # BLD AUTO: ABNORMAL K/UL (ref 0.3–1)
MONOCYTES NFR BLD: 5 % (ref 4–15)
NEUTROPHILS NFR BLD: 82 % (ref 38–73)
NITRITE UR QL STRIP: NEGATIVE
PH UR STRIP: >8 [PH] (ref 5–8)
PLATELET # BLD AUTO: 211 K/UL (ref 150–350)
PLATELET BLD QL SMEAR: ABNORMAL
PMV BLD AUTO: 10.6 FL (ref 9.2–12.9)
POTASSIUM SERPL-SCNC: 3.5 MMOL/L (ref 3.5–5.1)
PROT SERPL-MCNC: 7.9 G/DL (ref 6–8.4)
PROT UR QL STRIP: ABNORMAL
PROTHROMBIN TIME: 10.3 SEC (ref 9–12.5)
RBC # BLD AUTO: 4.79 M/UL (ref 4–5.4)
RBC #/AREA URNS HPF: 80 /HPF (ref 0–4)
SODIUM SERPL-SCNC: 138 MMOL/L (ref 136–145)
SP GR UR STRIP: 1.01 (ref 1–1.03)
SQUAMOUS #/AREA URNS HPF: 16 /HPF
URN SPEC COLLECT METH UR: ABNORMAL
UROBILINOGEN UR STRIP-ACNC: 1 EU/DL
WBC # BLD AUTO: 8.53 K/UL (ref 3.9–12.7)
WBC #/AREA URNS HPF: 5 /HPF (ref 0–5)
WBC CLUMPS URNS QL MICRO: ABNORMAL
YEAST URNS QL MICRO: ABNORMAL

## 2019-06-26 PROCEDURE — 63600175 PHARM REV CODE 636 W HCPCS: Performed by: EMERGENCY MEDICINE

## 2019-06-26 PROCEDURE — 85610 PROTHROMBIN TIME: CPT

## 2019-06-26 PROCEDURE — 96361 HYDRATE IV INFUSION ADD-ON: CPT

## 2019-06-26 PROCEDURE — 83690 ASSAY OF LIPASE: CPT

## 2019-06-26 PROCEDURE — 25000003 PHARM REV CODE 250: Performed by: EMERGENCY MEDICINE

## 2019-06-26 PROCEDURE — 85007 BL SMEAR W/DIFF WBC COUNT: CPT

## 2019-06-26 PROCEDURE — 85027 COMPLETE CBC AUTOMATED: CPT

## 2019-06-26 PROCEDURE — 81000 URINALYSIS NONAUTO W/SCOPE: CPT

## 2019-06-26 PROCEDURE — 93010 ELECTROCARDIOGRAM REPORT: CPT | Mod: ,,, | Performed by: INTERNAL MEDICINE

## 2019-06-26 PROCEDURE — 81025 URINE PREGNANCY TEST: CPT | Performed by: EMERGENCY MEDICINE

## 2019-06-26 PROCEDURE — 93010 EKG 12-LEAD: ICD-10-PCS | Mod: ,,, | Performed by: INTERNAL MEDICINE

## 2019-06-26 PROCEDURE — 20000000 HC ICU ROOM

## 2019-06-26 PROCEDURE — 12000002 HC ACUTE/MED SURGE SEMI-PRIVATE ROOM

## 2019-06-26 PROCEDURE — 93005 ELECTROCARDIOGRAM TRACING: CPT

## 2019-06-26 PROCEDURE — 99285 EMERGENCY DEPT VISIT HI MDM: CPT | Mod: 25

## 2019-06-26 PROCEDURE — 96374 THER/PROPH/DIAG INJ IV PUSH: CPT

## 2019-06-26 PROCEDURE — 96376 TX/PRO/DX INJ SAME DRUG ADON: CPT

## 2019-06-26 PROCEDURE — 96375 TX/PRO/DX INJ NEW DRUG ADDON: CPT | Mod: 59

## 2019-06-26 PROCEDURE — 80053 COMPREHEN METABOLIC PANEL: CPT

## 2019-06-26 RX ORDER — IBUPROFEN 200 MG
16 TABLET ORAL
Status: DISCONTINUED | OUTPATIENT
Start: 2019-06-26 | End: 2019-06-28 | Stop reason: HOSPADM

## 2019-06-26 RX ORDER — DIAZEPAM 10 MG/2ML
2 INJECTION INTRAMUSCULAR
Status: COMPLETED | OUTPATIENT
Start: 2019-06-26 | End: 2019-06-26

## 2019-06-26 RX ORDER — DIAZEPAM 10 MG/2ML
5 INJECTION INTRAMUSCULAR
Status: COMPLETED | OUTPATIENT
Start: 2019-06-26 | End: 2019-06-26

## 2019-06-26 RX ORDER — ONDANSETRON 2 MG/ML
8 INJECTION INTRAMUSCULAR; INTRAVENOUS EVERY 8 HOURS PRN
Status: DISCONTINUED | OUTPATIENT
Start: 2019-06-26 | End: 2019-06-28 | Stop reason: HOSPADM

## 2019-06-26 RX ORDER — ONDANSETRON 2 MG/ML
4 INJECTION INTRAMUSCULAR; INTRAVENOUS
Status: COMPLETED | OUTPATIENT
Start: 2019-06-26 | End: 2019-06-26

## 2019-06-26 RX ORDER — GLUCAGON 1 MG
1 KIT INJECTION
Status: DISCONTINUED | OUTPATIENT
Start: 2019-06-26 | End: 2019-06-28 | Stop reason: HOSPADM

## 2019-06-26 RX ORDER — PANTOPRAZOLE SODIUM 40 MG/1
40 TABLET, DELAYED RELEASE ORAL
Status: COMPLETED | OUTPATIENT
Start: 2019-06-26 | End: 2019-06-26

## 2019-06-26 RX ORDER — ENOXAPARIN SODIUM 100 MG/ML
40 INJECTION SUBCUTANEOUS EVERY 24 HOURS
Status: DISCONTINUED | OUTPATIENT
Start: 2019-06-26 | End: 2019-06-28 | Stop reason: HOSPADM

## 2019-06-26 RX ORDER — SODIUM CHLORIDE 0.9 % (FLUSH) 0.9 %
10 SYRINGE (ML) INJECTION
Status: DISCONTINUED | OUTPATIENT
Start: 2019-06-26 | End: 2019-06-28 | Stop reason: HOSPADM

## 2019-06-26 RX ORDER — THIAMINE HCL 100 MG
100 TABLET ORAL DAILY
Status: DISCONTINUED | OUTPATIENT
Start: 2019-06-27 | End: 2019-06-27

## 2019-06-26 RX ORDER — DIAZEPAM 10 MG/2ML
2 INJECTION INTRAMUSCULAR EVERY 4 HOURS PRN
Status: DISCONTINUED | OUTPATIENT
Start: 2019-06-26 | End: 2019-06-28 | Stop reason: HOSPADM

## 2019-06-26 RX ORDER — IBUPROFEN 200 MG
24 TABLET ORAL
Status: DISCONTINUED | OUTPATIENT
Start: 2019-06-26 | End: 2019-06-28 | Stop reason: HOSPADM

## 2019-06-26 RX ORDER — DEXTROSE MONOHYDRATE AND SODIUM CHLORIDE 5; .9 G/100ML; G/100ML
INJECTION, SOLUTION INTRAVENOUS CONTINUOUS
Status: DISCONTINUED | OUTPATIENT
Start: 2019-06-26 | End: 2019-06-28 | Stop reason: HOSPADM

## 2019-06-26 RX ADMIN — FOLIC ACID: 5 INJECTION, SOLUTION INTRAMUSCULAR; INTRAVENOUS; SUBCUTANEOUS at 10:06

## 2019-06-26 RX ADMIN — SODIUM CHLORIDE 1000 ML: 0.9 INJECTION, SOLUTION INTRAVENOUS at 06:06

## 2019-06-26 RX ADMIN — ONDANSETRON 4 MG: 2 INJECTION INTRAMUSCULAR; INTRAVENOUS at 10:06

## 2019-06-26 RX ADMIN — DIAZEPAM 5 MG: 5 INJECTION, SOLUTION INTRAMUSCULAR; INTRAVENOUS at 09:06

## 2019-06-26 RX ADMIN — PANTOPRAZOLE SODIUM 40 MG: 40 TABLET, DELAYED RELEASE ORAL at 06:06

## 2019-06-26 RX ADMIN — DIAZEPAM 2 MG: 5 INJECTION, SOLUTION INTRAMUSCULAR; INTRAVENOUS at 06:06

## 2019-06-26 NOTE — ED PROVIDER NOTES
"Encounter Date: 6/26/2019    SCRIBE #1 NOTE: I, Estherwendy Antonio, am scribing for, and in the presence of, Dr. Lancaster.       History     Chief Complaint   Patient presents with    Withdrawal     Pt reports last alcohol 15 hours ago " I had one beer". + generalized HA. Denies druge use, Hi/SI/AH/VH at this time. No hx of seizures.      Time seen by provider: 6:03 PM    This is a 29 y.o. female with hx of HTN and anxiety who presents with alcohol withdrawal. She has been drinking whiskey and wine every day for about three weeks. She has been decreasing her alcohol intake over the last few days with last drink (beer) 16 hours ago. She has tried to quit drinking once before, but has not had inpatient treatment. She reports palpitations, anxiety, tremors, lower abdominal pain, nausea, three or four episodes of vomiting in the past week, black diarrhea for three or four days, and vaginal bleeding which she attributed to IUD.    The history is provided by the patient.     Review of patient's allergies indicates:  No Known Allergies  Past Medical History:   Diagnosis Date    Anxiety     Hypertension     Miscarriage      Past Surgical History:   Procedure Laterality Date    BREAST LUMPECTOMY       Family History   Problem Relation Age of Onset    No Known Problems Mother     No Known Problems Father      Social History     Tobacco Use    Smoking status: Current Every Day Smoker     Packs/day: 0.50     Types: Cigarettes    Smokeless tobacco: Never Used   Substance Use Topics    Alcohol use: Yes     Comment: "I'm an alcoholic"    Drug use: Yes     Types: Cocaine, Marijuana     Comment: ocassionally     Review of Systems   Constitutional: Negative for fever.   HENT: Negative for sore throat.    Respiratory: Negative for shortness of breath.    Cardiovascular: Positive for palpitations. Negative for chest pain.   Gastrointestinal: Positive for abdominal pain, blood in stool (black appearance), diarrhea, nausea and " vomiting. Negative for abdominal distention.   Genitourinary: Positive for vaginal bleeding. Negative for dysuria.   Musculoskeletal: Negative for back pain.   Skin: Negative for rash.   Neurological: Positive for tremors. Negative for weakness and numbness.   Hematological: Does not bruise/bleed easily.   Psychiatric/Behavioral: The patient is nervous/anxious.        Physical Exam     Initial Vitals [06/26/19 1717]   BP Pulse Resp Temp SpO2   (!) 159/99 105 20 98.3 °F (36.8 °C) 97 %      MAP       --         Physical Exam    Nursing note and vitals reviewed.  Constitutional: She appears well-developed and well-nourished. She is not diaphoretic. No distress.   HENT:   Head: Normocephalic and atraumatic.   Eyes: EOM are normal. Pupils are equal, round, and reactive to light.   Neck: Normal range of motion. Neck supple.   Cardiovascular: Regular rhythm, normal heart sounds and intact distal pulses. Tachycardia present.  Exam reveals no gallop and no friction rub.    No murmur heard.  Pulmonary/Chest: Breath sounds normal. No respiratory distress. She has no wheezes. She has no rhonchi. She has no rales.   Abdominal: Soft. She exhibits no distension. There is tenderness in the epigastric area. There is no rebound and no guarding.   Musculoskeletal: Normal range of motion. She exhibits no edema or tenderness.   Neurological: She is alert and oriented to person, place, and time.   Fine tremor bilaterally.   Skin: Skin is warm and dry.         ED Course   Procedures  Labs Reviewed   CBC W/ AUTO DIFFERENTIAL - Abnormal; Notable for the following components:       Result Value    Gran% 82.0 (*)     Lymph% 13.0 (*)     All other components within normal limits   COMPREHENSIVE METABOLIC PANEL - Abnormal; Notable for the following components:    CO2 19 (*)     AST 84 (*)     ALT 68 (*)     Anion Gap 20 (*)     All other components within normal limits    Narrative:     Recoll. [id number] by BILLY at 06/26/2019 19:00, reason:  Specimen   hemolyzed notified Negin Parham   URINALYSIS, REFLEX TO URINE CULTURE - Abnormal; Notable for the following components:    pH, UA >8.0 (*)     Protein, UA 2+ (*)     Ketones, UA 2+ (*)     Occult Blood UA 3+ (*)     Leukocytes, UA Trace (*)     All other components within normal limits    Narrative:     Preferred Collection Type->Urine, Clean Catch   URINALYSIS MICROSCOPIC - Abnormal; Notable for the following components:    RBC, UA 80 (*)     All other components within normal limits    Narrative:     Preferred Collection Type->Urine, Clean Catch   LIPASE    Narrative:     Recoll. [id number] by [email or web address] at 06/26/2019 19:00, reason: Specimen   hemolyzed notified Negin Parham   PROTIME-INR    Narrative:     Recoll. [id number] by [email or web address] at 06/26/2019 19:00, reason: Specimen   hemolyzed notified Negin Parham   POCT URINE PREGNANCY     EKG Readings: (Independently Interpreted)   Sinus tachycardia at rate of 101. Normal axis. No ST elevations or depressions.       Imaging Results    None          Medical Decision Making:   History:   Old Medical Records: I decided to obtain old medical records.  Old Records Summarized: records from previous admission(s).       <> Summary of Records: 5/23/2019- seen in ED in , offered admission, pt requested discharge  Initial Assessment:   Emergent evaluation of 29 y.o. female hx of alcohol abuse here with tremors, epigastric pain, last drink 15 hours ago.   Differential Diagnosis:   Alcoholic induced gastritis, peptic ulcer, acute alcohol withdrawal without delirium  Independently Interpreted Test(s):   I have ordered and independently interpreted EKG Reading(s) - see prior notes  Clinical Tests:   Lab Tests: Ordered and Reviewed  Medical Tests: Ordered and Reviewed  ED Management:  -labs  -Protonix  -valium  -EKG    Labs reviewed. H&H stable, no leukocytosis, CMP with mild transaminitis, and anion gap of 20 likely alcohol related.    8:58 PM  Reassessed. Patient still  with tremors after valium. Additional valium ordered.    9:51 PM  Case discussed with , patient meets inpatient. Case discussed with BARBARA Herrera, and will admit patient to Dr. Dillon.            Scribe Attestation:   Scribe #1: I performed the above scribed service and the documentation accurately describes the services I performed. I attest to the accuracy of the note.    Attending Attestation:         Attending Critical Care:   Critical Care Times:   ==============================================================  · Total Critical Care Time - exclusive of procedural time: 45 minutes.  ==============================================================  Critical care reasons: alcohol withdrawal.   Critical care was time spent personally by me on the following activities: examination of patient, review of x-rays / CT sent with the patient, ordering lab, x-rays, and/or EKG, development of treatment plan with patient or relative, evaluation of patient's response to treatment, discussion with consultants and re-evaluation of patient's conition.   Critical Care Condition: potentially life-threatening     Physician Attestation for Scribe:  Physician Attestation Statement for Scribe #1: I, Dr. Lancaster, reviewed documentation, as scribed by Esther Antonio in my presence, and it is both accurate and complete.                    Clinical Impression:     1. Tachycardia    2. Withdrawal symptoms, alcohol    3. Alcohol withdrawal syndrome without complication          Disposition:   Disposition: Admitted  Condition: Fair                        Carolyen Lancaster MD  06/26/19 2358       Carolyne Lancaster MD  06/27/19 0000

## 2019-06-26 NOTE — ED TRIAGE NOTES
"Patient presents to ER c/o of alcohol withdrawals stated the last time she had a drink was 16 hours ago.  Pt states feeling very anxious and states "my heart feels like its beating out my chest.  Patient c/o chest tightness 8/10.  +N/+V/+Diarrhea. Patient denies sob.    "

## 2019-06-27 PROBLEM — R74.8 ELEVATED LIVER ENZYMES: Status: ACTIVE | Noted: 2019-06-27

## 2019-06-27 LAB
ANION GAP SERPL CALC-SCNC: 14 MMOL/L (ref 8–16)
BASOPHILS # BLD AUTO: 0.03 K/UL (ref 0–0.2)
BASOPHILS NFR BLD: 0.4 % (ref 0–1.9)
BUN SERPL-MCNC: 6 MG/DL (ref 6–20)
CALCIUM SERPL-MCNC: 8.8 MG/DL (ref 8.7–10.5)
CHLORIDE SERPL-SCNC: 101 MMOL/L (ref 95–110)
CO2 SERPL-SCNC: 22 MMOL/L (ref 23–29)
CREAT SERPL-MCNC: 0.7 MG/DL (ref 0.5–1.4)
DIFFERENTIAL METHOD: ABNORMAL
EOSINOPHIL # BLD AUTO: 0 K/UL (ref 0–0.5)
EOSINOPHIL NFR BLD: 0 % (ref 0–8)
ERYTHROCYTE [DISTWIDTH] IN BLOOD BY AUTOMATED COUNT: 14.4 % (ref 11.5–14.5)
EST. GFR  (AFRICAN AMERICAN): >60 ML/MIN/1.73 M^2
EST. GFR  (NON AFRICAN AMERICAN): >60 ML/MIN/1.73 M^2
ESTIMATED AVG GLUCOSE: 94 MG/DL (ref 68–131)
GLUCOSE SERPL-MCNC: 85 MG/DL (ref 70–110)
HBA1C MFR BLD HPLC: 4.9 % (ref 4–5.6)
HCT VFR BLD AUTO: 39 % (ref 37–48.5)
HGB BLD-MCNC: 13.2 G/DL (ref 12–16)
LYMPHOCYTES # BLD AUTO: 0.9 K/UL (ref 1–4.8)
LYMPHOCYTES NFR BLD: 12.1 % (ref 18–48)
MAGNESIUM SERPL-MCNC: 1.6 MG/DL (ref 1.6–2.6)
MCH RBC QN AUTO: 31 PG (ref 27–31)
MCHC RBC AUTO-ENTMCNC: 33.8 G/DL (ref 32–36)
MCV RBC AUTO: 92 FL (ref 82–98)
MONOCYTES # BLD AUTO: 0.4 K/UL (ref 0.3–1)
MONOCYTES NFR BLD: 6.3 % (ref 4–15)
NEUTROPHILS # BLD AUTO: 5.7 K/UL (ref 1.8–7.7)
NEUTROPHILS NFR BLD: 81.2 % (ref 38–73)
OB PNL STL: NEGATIVE
PHOSPHATE SERPL-MCNC: 2.2 MG/DL (ref 2.7–4.5)
PHOSPHATE SERPL-MCNC: 2.2 MG/DL (ref 2.7–4.5)
PLATELET # BLD AUTO: 163 K/UL (ref 150–350)
PMV BLD AUTO: 10.3 FL (ref 9.2–12.9)
POTASSIUM SERPL-SCNC: 3.8 MMOL/L (ref 3.5–5.1)
RBC # BLD AUTO: 4.26 M/UL (ref 4–5.4)
SODIUM SERPL-SCNC: 137 MMOL/L (ref 136–145)
VIT B12 SERPL-MCNC: 751 PG/ML (ref 210–950)
WBC # BLD AUTO: 7.04 K/UL (ref 3.9–12.7)

## 2019-06-27 PROCEDURE — 97802 MEDICAL NUTRITION INDIV IN: CPT

## 2019-06-27 PROCEDURE — 83036 HEMOGLOBIN GLYCOSYLATED A1C: CPT

## 2019-06-27 PROCEDURE — 36415 COLL VENOUS BLD VENIPUNCTURE: CPT

## 2019-06-27 PROCEDURE — 99291 CRITICAL CARE FIRST HOUR: CPT | Mod: ,,, | Performed by: INTERNAL MEDICINE

## 2019-06-27 PROCEDURE — 94761 N-INVAS EAR/PLS OXIMETRY MLT: CPT

## 2019-06-27 PROCEDURE — 80074 ACUTE HEPATITIS PANEL: CPT

## 2019-06-27 PROCEDURE — 83735 ASSAY OF MAGNESIUM: CPT

## 2019-06-27 PROCEDURE — 85025 COMPLETE CBC W/AUTO DIFF WBC: CPT

## 2019-06-27 PROCEDURE — 25000003 PHARM REV CODE 250: Performed by: INTERNAL MEDICINE

## 2019-06-27 PROCEDURE — 25000003 PHARM REV CODE 250: Performed by: PHYSICIAN ASSISTANT

## 2019-06-27 PROCEDURE — 82746 ASSAY OF FOLIC ACID SERUM: CPT

## 2019-06-27 PROCEDURE — 20000000 HC ICU ROOM

## 2019-06-27 PROCEDURE — 82607 VITAMIN B-12: CPT

## 2019-06-27 PROCEDURE — 63600175 PHARM REV CODE 636 W HCPCS: Performed by: PHYSICIAN ASSISTANT

## 2019-06-27 PROCEDURE — 99291 PR CRITICAL CARE, E/M 30-74 MINUTES: ICD-10-PCS | Mod: ,,, | Performed by: INTERNAL MEDICINE

## 2019-06-27 PROCEDURE — 84100 ASSAY OF PHOSPHORUS: CPT

## 2019-06-27 PROCEDURE — 82272 OCCULT BLD FECES 1-3 TESTS: CPT

## 2019-06-27 PROCEDURE — 80048 BASIC METABOLIC PNL TOTAL CA: CPT

## 2019-06-27 RX ORDER — THIAMINE HCL 100 MG
100 TABLET ORAL DAILY
Status: DISCONTINUED | OUTPATIENT
Start: 2019-06-27 | End: 2019-06-28 | Stop reason: HOSPADM

## 2019-06-27 RX ORDER — AMLODIPINE BESYLATE 2.5 MG/1
2.5 TABLET ORAL DAILY
Status: ON HOLD | COMMUNITY
End: 2019-06-28 | Stop reason: HOSPADM

## 2019-06-27 RX ORDER — ACETAMINOPHEN 325 MG/1
650 TABLET ORAL EVERY 6 HOURS PRN
Status: DISCONTINUED | OUTPATIENT
Start: 2019-06-27 | End: 2019-06-28 | Stop reason: HOSPADM

## 2019-06-27 RX ORDER — DIAZEPAM 10 MG/2ML
2 INJECTION INTRAMUSCULAR ONCE
Status: DISCONTINUED | OUTPATIENT
Start: 2019-06-27 | End: 2019-06-27

## 2019-06-27 RX ORDER — FOLIC ACID 1 MG/1
1 TABLET ORAL DAILY
Status: DISCONTINUED | OUTPATIENT
Start: 2019-06-27 | End: 2019-06-28

## 2019-06-27 RX ORDER — DIAZEPAM 5 MG/1
5 TABLET ORAL EVERY 8 HOURS
Status: DISCONTINUED | OUTPATIENT
Start: 2019-06-27 | End: 2019-06-28 | Stop reason: HOSPADM

## 2019-06-27 RX ORDER — LABETALOL HYDROCHLORIDE 5 MG/ML
10 INJECTION, SOLUTION INTRAVENOUS EVERY 6 HOURS PRN
Status: DISCONTINUED | OUTPATIENT
Start: 2019-06-27 | End: 2019-06-28 | Stop reason: HOSPADM

## 2019-06-27 RX ORDER — LOPERAMIDE HYDROCHLORIDE 2 MG/1
2 CAPSULE ORAL 4 TIMES DAILY PRN
Status: DISCONTINUED | OUTPATIENT
Start: 2019-06-27 | End: 2019-06-28 | Stop reason: HOSPADM

## 2019-06-27 RX ORDER — PANTOPRAZOLE SODIUM 40 MG/1
40 TABLET, DELAYED RELEASE ORAL DAILY
Status: DISCONTINUED | OUTPATIENT
Start: 2019-06-27 | End: 2019-06-28 | Stop reason: HOSPADM

## 2019-06-27 RX ORDER — LABETALOL HYDROCHLORIDE 5 MG/ML
10 INJECTION, SOLUTION INTRAVENOUS EVERY 6 HOURS PRN
Status: DISCONTINUED | OUTPATIENT
Start: 2019-06-27 | End: 2019-06-27

## 2019-06-27 RX ORDER — SODIUM,POTASSIUM PHOSPHATES 280-250MG
1 POWDER IN PACKET (EA) ORAL
Status: COMPLETED | OUTPATIENT
Start: 2019-06-27 | End: 2019-06-28

## 2019-06-27 RX ORDER — DIPHENHYDRAMINE HCL 50 MG
50 CAPSULE ORAL NIGHTLY PRN
COMMUNITY

## 2019-06-27 RX ORDER — L. ACIDOPHILUS/L.BULGARICUS 100MM CELL
1 GRANULES IN PACKET (EA) ORAL 2 TIMES DAILY
Status: DISCONTINUED | OUTPATIENT
Start: 2019-06-27 | End: 2019-06-27

## 2019-06-27 RX ORDER — DIPHENHYDRAMINE HCL 25 MG
25 CAPSULE ORAL NIGHTLY PRN
Status: DISCONTINUED | OUTPATIENT
Start: 2019-06-27 | End: 2019-06-28 | Stop reason: HOSPADM

## 2019-06-27 RX ORDER — AMLODIPINE BESYLATE 5 MG/1
5 TABLET ORAL DAILY
Status: DISCONTINUED | OUTPATIENT
Start: 2019-06-27 | End: 2019-06-28 | Stop reason: HOSPADM

## 2019-06-27 RX ORDER — LANOLIN ALCOHOL/MO/W.PET/CERES
400 CREAM (GRAM) TOPICAL 2 TIMES DAILY
Status: DISCONTINUED | OUTPATIENT
Start: 2019-06-27 | End: 2019-06-28 | Stop reason: HOSPADM

## 2019-06-27 RX ORDER — UBIDECARENONE 75 MG
500 CAPSULE ORAL DAILY
COMMUNITY

## 2019-06-27 RX ADMIN — AMLODIPINE BESYLATE 5 MG: 5 TABLET ORAL at 09:06

## 2019-06-27 RX ADMIN — DIAZEPAM 5 MG: 5 TABLET ORAL at 10:06

## 2019-06-27 RX ADMIN — ONDANSETRON 8 MG: 2 INJECTION INTRAMUSCULAR; INTRAVENOUS at 02:06

## 2019-06-27 RX ADMIN — LOPERAMIDE HYDROCHLORIDE 2 MG: 2 CAPSULE ORAL at 08:06

## 2019-06-27 RX ADMIN — FOLIC ACID 1 MG: 1 TABLET ORAL at 08:06

## 2019-06-27 RX ADMIN — Medication 100 MG: at 04:06

## 2019-06-27 RX ADMIN — POTASSIUM & SODIUM PHOSPHATES POWDER PACK 280-160-250 MG 1 PACKET: 280-160-250 PACK at 04:06

## 2019-06-27 RX ADMIN — DIAZEPAM 2 MG: 5 INJECTION, SOLUTION INTRAMUSCULAR; INTRAVENOUS at 12:06

## 2019-06-27 RX ADMIN — DEXTROSE AND SODIUM CHLORIDE: 5; .9 INJECTION, SOLUTION INTRAVENOUS at 12:06

## 2019-06-27 RX ADMIN — ENOXAPARIN SODIUM 40 MG: 100 INJECTION SUBCUTANEOUS at 12:06

## 2019-06-27 RX ADMIN — DIPHENHYDRAMINE HYDROCHLORIDE 25 MG: 25 CAPSULE ORAL at 10:06

## 2019-06-27 RX ADMIN — LABETALOL HYDROCHLORIDE 10 MG: 5 INJECTION, SOLUTION INTRAVENOUS at 03:06

## 2019-06-27 RX ADMIN — POTASSIUM & SODIUM PHOSPHATES POWDER PACK 280-160-250 MG 1 PACKET: 280-160-250 PACK at 08:06

## 2019-06-27 RX ADMIN — DIAZEPAM 2 MG: 5 INJECTION, SOLUTION INTRAMUSCULAR; INTRAVENOUS at 04:06

## 2019-06-27 RX ADMIN — DIAZEPAM 5 MG: 5 TABLET ORAL at 01:06

## 2019-06-27 RX ADMIN — LOPERAMIDE HYDROCHLORIDE 2 MG: 2 CAPSULE ORAL at 01:06

## 2019-06-27 RX ADMIN — PANTOPRAZOLE SODIUM 40 MG: 40 TABLET, DELAYED RELEASE ORAL at 08:06

## 2019-06-27 RX ADMIN — Medication 400 MG: at 08:06

## 2019-06-27 RX ADMIN — DEXTROSE AND SODIUM CHLORIDE: 5; .9 INJECTION, SOLUTION INTRAVENOUS at 03:06

## 2019-06-27 RX ADMIN — ACETAMINOPHEN 650 MG: 325 TABLET, FILM COATED ORAL at 03:06

## 2019-06-27 RX ADMIN — DIAZEPAM 2 MG: 5 INJECTION, SOLUTION INTRAMUSCULAR; INTRAVENOUS at 08:06

## 2019-06-27 RX ADMIN — THERA TABS 1 TABLET: TAB at 08:06

## 2019-06-27 RX ADMIN — DEXTROSE AND SODIUM CHLORIDE: 5; .9 INJECTION, SOLUTION INTRAVENOUS at 08:06

## 2019-06-27 RX ADMIN — ENOXAPARIN SODIUM 40 MG: 100 INJECTION SUBCUTANEOUS at 04:06

## 2019-06-27 NOTE — PROGRESS NOTES
"HTN noted, -170/100-110. MD aware, see previous note. Afebrile. SR to ST on monitor. On room air, with no respiratory distress noted or reported. Oriented x4. Remains tremulous, however tremors have decreased since this morning. No nausea or vomiting. Several small loose, brown stools throughout the day. PRN imodium administered, pt states she thinks the diarrhea is "slacking off" now. Diet advanced to regular, tolerating well. Ambulates to toilet and around the room with minimal assistance, educated pt to call when wanting to get up, verbalized understanding. Free from falls and injuries. Up to date with POC. Call light in reach. Will continue to monitor.    "

## 2019-06-27 NOTE — PHARMACY MED REC
"Admission Medication Reconciliation - Pharmacy Consult Note    The home medication history was taken by Elodia Campbell CPhT.  Based on information gathered and subsequent review by the clinical pharmacist, the items below may need attention.    You may go to "Admission" then "Reconcile Home Medications" tabs to review and/or act upon these items.    No issues noted with the medication reconciliation.    Medications Prior to Admission   Medication Sig Dispense Refill Last Dose    amLODIPine (NORVASC) 2.5 MG tablet Take 2.5 mg by mouth once daily.       cyanocobalamin (VITAMIN B-12) 500 MCG tablet Take 500 mcg by mouth once daily.       diphenhydrAMINE (BENADRYL) 50 MG capsule Take 50 mg by mouth nightly as needed for Insomnia.       multivit-minerals/folic acid (WOMEN'S MULTIVITAMIN GUMMIES ORAL) Take by mouth once daily.       POTASSIUM ORAL Take 1 tablet by mouth once daily.          Please address this information as you see fit.  Feel free to contact us if you have any questions or require assistance.    Danyel Garnett, Pharm.D., BCPS  [phone]                .  .          "

## 2019-06-27 NOTE — PROGRESS NOTES
Administered labetalol 10mg per order, lowest BP post administration 155/99. An hour after administration, /110. Notified MD, no new orders.

## 2019-06-27 NOTE — HPI
Ms. Esther Romo is a 29 y.o. female, with PMH of HTN, anxiety, alcoholism, who presented to [facility] ED on 6/26/19 2/2 alcohol withdrawal symptoms. She indicated her last drink was ~18-20 hours PTA. She had been drinking a pint whiskey and a bottle wine daily x 3 weeks. She has been decreasing her intake over the past few days. She states she has been drinking since she was 12 years old. Associated symptoms include generalized headache, palpitations, anxiety, tremors, lower abdominal pain, nausea, vomiting, and black stools. She was evaluated in the ED and admitted to inpatient status to the ICU.

## 2019-06-27 NOTE — PROGRESS NOTES
"Pt c/o headache, states "I think it's from not sleeping at all since I've been here." Also, BP noted to be 161/107. Ordered to administer labetalol 10mg IV for SBP >170 or DBP >100. Also, administered tylenol PRN. MD ordered benadryl PRN nightly for insomnia.   "

## 2019-06-27 NOTE — EICU
EICU    Pt is a 30 y/o f with pmhx sig for  Presents with signs an sx c/w etoh withdrawal who has been drinking whiskey and beer daily for last three weeks and last etoh was 16hrs prior to ER visit.  +palps/anxiety/abd pain/nausea/nonbloddy emesis, dark loose stools, vag bleeding she attributes to IUD, last MP.  In the /99 hr 105 rr 20 afebrile 97% RA.  Pt received valium 2mg and then received another 5 mg per Epic records; no hx seiuzures, intubations, nausea and tremulous typical features as well as loose stools; no ill contacts, no recent travel    PE 98% 152/93 hr 94 RR 20  Alert and oriented X 3 nonlabored breathing knows Trump is pres  Appears well, no clear tongue fascis  Mild ue tremor    Na 138 K 3.5 HCO3 19 ag 20 bun 6 creat 0.7 gluc 91 Ca 8  ast 84 Alt 68 alk phos 70 tb 1 tp 7.9 alb 4.3  Wbc 8.5 P 82 L 13 M 5  hgb 14.8 mcv 90  plt 211  INR 0.9upreg neg  utox neg  Urine preg neg  ua sg 1.015 2+ ketones 80 rbc 5 wbc neg nite/tr le15 sq epi  etoh < 10  Trop 0.013      A/P  Etoh Withdrawal  - symptom triggered benzos, barbs/precedex if needed  - thiamine, fol  - fluids as needed prn  - sao2 ok nonlabored breating  - keep lytes replete, needs Po4 checked and Mg  - long d/w pt about socaial and medical consequences of cont etoh abuse and I recommended inpt detox; she says she needs to work to pay rent on new place  - suggested she speak to sw and to reconsider inpt detox  - dvt proph, if ambulatory immediatley may be less necessary

## 2019-06-27 NOTE — ASSESSMENT & PLAN NOTE
Contributing Nutrition Diagnosis  Inadequate protein-energy intake    Related to (etiology):   Decreased appetite in relation to alcohol withdrawal    Signs and Symptoms (as evidenced by):   Pt report of not eating 1 full meal at home, experiencing nausea and diarrhea    Interventions:  Monitor hydration status     Recommendations (treatment strategy):  Continue full liquid diet and advance as tolerated by pt     Nutrition Diagnosis Status:   New

## 2019-06-27 NOTE — CONSULTS
"  [facility]  Adult Nutrition  Consult Note    SUMMARY     Recommendations    Recommendation/Intervention: 1. Continue full liquid diet and advance as tolerated by pt 2. Monitor hydration status   Goals: Keep PO intake >85% while in patient  Nutrition Goal Status: new  Communication of RD Recs: other (comment)(POC)    Reason for Assessment    Reason For Assessment: consult  Diagnosis: other (see comments)(Alcohol withdrawal)  Relevant Medical History: HTN  General Information Comments: Assessed pt on energy intake. Pt was very happy to report that she has been tolerating her full liquid diet and has been eating 90% of her meals excluding jello because she "does not like aspartame". Pt does report that she is currently still experiencing diarrhea. Pt states that she has not had any weight loss and charts concur. Pt does admit though that she was not eating well at home due to a decreased appetite in relation to her withdrawal. Her meals consisted of few bites of food throughout the day, not enough to consider a meal. NFPE was conducted in response to pt reporting such a low intake at home but no malnutrition indicators present in physical findings. Pt asked is she could be given probiotics while inpatient and asked for good sources of probiotics for when she discharges.  Nutrition Discharge Planning: Regular diet or diet as tolerated by pt    Nutrition Risk Screen    Nutrition Risk Screen: no indicators present    Nutrition/Diet History    Patient Reported Diet/Restrictions/Preferences: general  Spiritual, Cultural Beliefs, Scientology Practices, Values that Affect Care: yes  Factors Affecting Nutritional Intake: decreased appetite(per pt in relation to withdrawal)    Anthropometrics    Temp: 99.5 °F (37.5 °C)  Height Method: Stated  Height: 5' 3" (160 cm)  Height (inches): 63 in  Weight Method: Bed Scale  Weight: 74.8 kg (164 lb 14.5 oz)  Weight (lb): 164.91 lb  Ideal Body Weight (IBW), Female: 115 " lb  % Ideal Body Weight, Female (lb): 143.4 lb  BMI (Calculated): 29.3  BMI Grade: 25 - 29.9 - overweight       Lab/Procedures/Meds    Pertinent Labs Reviewed: reviewed  Pertinent Labs Comments: phosphorus 2.2  Pertinent Medications Reviewed: reviewed  Pertinent Medications Comments: folic acid, multivitamin, thiamine      Estimated/Assessed Needs    Weight Used For Calorie Calculations: 74.8 kg (164 lb 14.5 oz)  Energy Calorie Requirements (kcal): 1736  Energy Need Method: [place]-St Jeor(1.2 AF)  Protein Requirements: 60-75 g(.8-1 g/kg)  Weight Used For Protein Calculations: 74.8 kg (164 lb 14.5 oz)  Fluid Requirements (mL): 1736  Estimated Fluid Requirement Method: RDA Method  RDA Method (mL): 1736         Nutrition Prescription Ordered    Current Diet Order: Full Liquid  Nutrition Order Comments: advance as tolerated     Evaluation of Received Nutrient/Fluid Intake    % Intake of Estimated Energy Needs: 50 - 75 %  % Meal Intake: 75 - 100 %    Nutrition Risk    Level of Risk/Frequency of Follow-up: low     Assessment and Plan    * Alcohol withdrawal  Contributing Nutrition Diagnosis  Inadequate protein-energy intake    Related to (etiology):   Decreased appetite in relation to alcohol withdrawal    Signs and Symptoms (as evidenced by):   Pt report of not eating 1 full meal at home, experiencing nausea and diarrhea    Interventions:  Monitor hydration status     Recommendations (treatment strategy):  Continue full liquid diet and advance as tolerated by pt     Nutrition Diagnosis Status:   New           Monitor and Evaluation    Food and Nutrient Intake: energy intake, food and beverage intake  Food and Nutrient Adminstration: diet order  Knowledge/Beliefs/Attitudes: food and nutrition knowledge/skill  Physical Activity and Function: nutrition-related ADLs and IADLs  Anthropometric Measurements: weight, weight change, body mass index  Biochemical Data, Medical Tests and Procedures: electrolyte and renal panel,  gastrointestinal profile, glucose/endocrine profile, inflammatory profile, lipid profile  Nutrition-Focused Physical Findings: overall appearance     Malnutrition Assessment  Energy Intake: moderate energy intake  Nails (Micronutrient): none  Eyes (Micronutrient): none  Extraoral (Micronutrient): none  Gums (Micronutrient): none  Lips/Mucous Membranes (Micronutrient): none  Teeth (Micronutrient): carries  Tongue (Micronutrient): none  Neck/Chest (Micronutrient): none  Musculoskeletal/Lower Extremities: none           Orbital Region (Subcutaneous Fat Loss): well nourished  Upper Arm Region (Subcutaneous Fat Loss): well nourished   [place] Region (Muscle Loss): well nourished  Clavicle Bone Region (Muscle Loss): well nourished  Clavicle and Acromion Bone Region (Muscle Loss): well nourished  Dorsal Hand (Muscle Loss): well nourished  Patellar Region (Muscle Loss): well nourished  Anterior Thigh Region (Muscle Loss): well nourished  Posterior Calf Region (Muscle Loss): well nourished   Edema (Fluid Accumulation): 0-->no edema present   Subcutaneous Fat Loss (Final Summary): well nourished  Muscle Loss Evaluation (Final Summary): well nourished         Nutrition Follow-Up    RD Follow-up?: Yes           Natalia Reed

## 2019-06-27 NOTE — ASSESSMENT & PLAN NOTE
- Ms. Esther Romo is admitted to inpatient status   - she has been drinking whiskey and wine daily x 3 weeks, and started decreasing her intake 2-3 days ago  - she now has withdrawal symptoms   - CIWA scale ordered  - PRN supportive medications ordered   - monitor and replace lytes PRN   - hydration w/ D5 after banana bag   - daily vitamins/supplements ordered   - psyllium and lactobacillus for loose stools ordered   - FOBT pending

## 2019-06-27 NOTE — SUBJECTIVE & OBJECTIVE
"Past Medical History:   Diagnosis Date    Anxiety     Hypertension     Miscarriage        Past Surgical History:   Procedure Laterality Date    BREAST LUMPECTOMY         Review of patient's allergies indicates:  No Known Allergies    No current facility-administered medications on file prior to encounter.      Current Outpatient Medications on File Prior to Encounter   Medication Sig    chlordiazepoxide (LIBRIUM) 10 MG capsule Take 8 capsules (80 mg total) by mouth 3 (three) times daily as needed for 1 day, THEN 7 capsules (70 mg total) 3 (three) times daily as needed for 1 day, THEN 6 capsules (60 mg total) 3 (three) times daily as needed for 1 day, THEN 5 capsules (50 mg total) 3 (three) times daily as needed for 1 day, THEN 4 capsules (40 mg total) 3 (three) times daily as needed for 1 day, THEN 3 capsules (30 mg total) 3 (three) times daily as needed for 1 day, THEN 2 capsules (20 mg total) 3 (three) times daily as needed for 1 day, THEN 1 capsule (10 mg total) 3 (three) times daily as needed.     Family History     Problem Relation (Age of Onset)    No Known Problems Mother, Father        Tobacco Use    Smoking status: Current Every Day Smoker     Packs/day: 0.50     Types: Cigarettes    Smokeless tobacco: Never Used   Substance and Sexual Activity    Alcohol use: Yes     Comment: "I'm an alcoholic"    Drug use: Yes     Types: Cocaine, Marijuana     Comment: ocassionally    Sexual activity: Not on file     Review of Systems   Constitutional: Negative for activity change, appetite change, chills, diaphoresis and fever.   HENT: Negative for congestion, ear pain, postnasal drip, rhinorrhea, sinus pressure, sore throat and trouble swallowing.    Respiratory: Negative for cough, shortness of breath and wheezing.    Cardiovascular: Negative for chest pain and palpitations.   Gastrointestinal: Positive for diarrhea and nausea. Negative for abdominal pain, blood in stool, constipation and vomiting. "   Genitourinary: Negative for dysuria, flank pain, frequency, hematuria and urgency.   Skin: Negative for color change, pallor, rash and wound.   Neurological: Positive for tremors. Negative for dizziness, syncope, weakness, light-headedness, numbness and headaches.   Psychiatric/Behavioral: Negative for confusion and decreased concentration.     Objective:     Vital Signs (Most Recent):  Temp: 98 °F (36.7 °C) (06/26/19 2324)  Pulse: 94 (06/27/19 0300)  Resp: 20 (06/27/19 0300)  BP: (!) 152/93 (06/27/19 0300)  SpO2: 98 % (06/27/19 0300) Vital Signs (24h Range):  Temp:  [98 °F (36.7 °C)-98.3 °F (36.8 °C)] 98 °F (36.7 °C)  Pulse:  [] 94  Resp:  [15-56] 20  SpO2:  [95 %-100 %] 98 %  BP: (147-170)/() 152/93     Weight: 74.8 kg (164 lb 14.5 oz)  Body mass index is 29.21 kg/m².    Physical Exam   Constitutional: She is oriented to person, place, and time. She appears well-developed and well-nourished. No distress.   HENT:   Head: Normocephalic and atraumatic.   Eyes: Pupils are equal, round, and reactive to light. Conjunctivae and EOM are normal. No scleral icterus.   Neck: Normal range of motion. Neck supple. No tracheal deviation present.   Cardiovascular: Normal rate, regular rhythm, normal heart sounds and intact distal pulses. Exam reveals no gallop and no friction rub.   No murmur heard.  Pulmonary/Chest: Effort normal and breath sounds normal. No stridor. No respiratory distress. She has no wheezes. She has no rales.   Abdominal: Soft. Bowel sounds are normal. She exhibits no distension. There is no tenderness. There is no guarding.   Neurological: She is alert and oriented to person, place, and time. No cranial nerve deficit. She exhibits normal muscle tone.   Skin: She is not diaphoretic.   Psychiatric: She has a normal mood and affect. Her behavior is normal. Judgment and thought content normal.   Nursing note and vitals reviewed.        CRANIAL NERVES     CN III, IV, VI   Pupils are equal, round,  and reactive to light.  Extraocular motions are normal.        Significant Labs:   BMP:   Recent Labs   Lab 06/27/19  0348   GLU 85      K 3.8      CO2 22*   BUN 6   CREATININE 0.7   CALCIUM 8.8   MG 1.6     CBC:   Recent Labs   Lab 06/26/19  1833 06/27/19 0348   WBC 8.53 7.04   HGB 14.8 13.2   HCT 43.2 39.0    163     CMP:   Recent Labs   Lab 06/26/19  1934 06/27/19  0348    137   K 3.5 3.8   CL 99 101   CO2 19* 22*   GLU 94 85   BUN 6 6   CREATININE 0.7 0.7   CALCIUM 9.0 8.8   PROT 7.9  --    ALBUMIN 4.3  --    BILITOT 1.0  --    ALKPHOS 70  --    AST 84*  --    ALT 68*  --    ANIONGAP 20* 14   EGFRNONAA >60 >60     Urine Culture: No results for input(s): LABURIN in the last 48 hours.  Urine Studies:   Recent Labs   Lab 06/26/19 1938   COLORU Yellow   APPEARANCEUA Clear   PHUR >8.0*   SPECGRAV 1.015   PROTEINUA 2+*   GLUCUA Negative   KETONESU 2+*   BILIRUBINUA Negative   OCCULTUA 3+*   NITRITE Negative   UROBILINOGEN 1.0   LEUKOCYTESUR Trace*   RBCUA 80*   WBCUA 5   BACTERIA Occasional   SQUAMEPITHEL 16   HYALINECASTS 0     All pertinent labs within the past 24 hours have been reviewed.    Significant Imaging: I have reviewed all pertinent imaging results/findings within the past 24 hours.   Imaging Results    None

## 2019-06-27 NOTE — H&P
"[facility] Hospital Medicine  History & Physical    Patient Name: Esther Romo  MRN: [id number]  Admission Date: 6/26/2019  Attending Physician: Alla Dillon MD   Primary Care Provider: Primary Doctor No         Patient information was obtained from patient, past medical records and ER records.     Subjective:     Principal Problem:Alcohol withdrawal    Chief Complaint:   Chief Complaint   Patient presents with    Withdrawal     Pt reports last alcohol 15 hours ago " I had one beer". + generalized HA. Denies druge use, Hi/SI/AH/VH at this time. No hx of seizures.         HPI: Ms. Esther Romo is a 29 y.o. female, with PMH of HTN, anxiety, alcoholism, who presented to [facility] ED on 6/26/19 2/2 alcohol withdrawal symptoms. She indicated her last drink was ~18-20 hours PTA. She had been drinking a pint whiskey and a bottle wine daily x 3 weeks. She has been decreasing her intake over the past few days. She states she has been drinking since she was 12 years old. Associated symptoms include generalized headache, palpitations, anxiety, tremors, lower abdominal pain, nausea, vomiting, and black stools. She was evaluated in the ED and admitted to inpatient status to the ICU.       Past Medical History:   Diagnosis Date    Anxiety     Hypertension     Miscarriage        Past Surgical History:   Procedure Laterality Date    BREAST LUMPECTOMY         Review of patient's allergies indicates:  No Known Allergies    No current facility-administered medications on file prior to encounter.      Current Outpatient Medications on File Prior to Encounter   Medication Sig    chlordiazepoxide (LIBRIUM) 10 MG capsule Take 8 capsules (80 mg total) by mouth 3 (three) times daily as needed for 1 day, THEN 7 capsules (70 mg total) 3 (three) times daily as needed for 1 day, THEN 6 capsules (60 mg total) 3 (three) times daily as needed for 1 day, THEN 5 capsules (50 mg total) 3 (three) times " "daily as needed for 1 day, THEN 4 capsules (40 mg total) 3 (three) times daily as needed for 1 day, THEN 3 capsules (30 mg total) 3 (three) times daily as needed for 1 day, THEN 2 capsules (20 mg total) 3 (three) times daily as needed for 1 day, THEN 1 capsule (10 mg total) 3 (three) times daily as needed.     Family History     Problem Relation (Age of Onset)    No Known Problems Mother, Father        Tobacco Use    Smoking status: Current Every Day Smoker     Packs/day: 0.50     Types: Cigarettes    Smokeless tobacco: Never Used   Substance and Sexual Activity    Alcohol use: Yes     Comment: "I'm an alcoholic"    Drug use: Yes     Types: Cocaine, Marijuana     Comment: ocassionally    Sexual activity: Not on file     Review of Systems   Constitutional: Negative for activity change, appetite change, chills, diaphoresis and fever.   HENT: Negative for congestion, ear pain, postnasal drip, rhinorrhea, sinus pressure, sore throat and trouble swallowing.    Respiratory: Negative for cough, shortness of breath and wheezing.    Cardiovascular: Negative for chest pain and palpitations.   Gastrointestinal: Positive for diarrhea and nausea. Negative for abdominal pain, blood in stool, constipation and vomiting.   Genitourinary: Negative for dysuria, flank pain, frequency, hematuria and urgency.   Skin: Negative for color change, pallor, rash and wound.   Neurological: Positive for tremors. Negative for dizziness, syncope, weakness, light-headedness, numbness and headaches.   Psychiatric/Behavioral: Negative for confusion and decreased concentration.     Objective:     Vital Signs (Most Recent):  Temp: 98 °F (36.7 °C) (06/26/19 2324)  Pulse: 94 (06/27/19 0300)  Resp: 20 (06/27/19 0300)  BP: (!) 152/93 (06/27/19 0300)  SpO2: 98 % (06/27/19 0300) Vital Signs (24h Range):  Temp:  [98 °F (36.7 °C)-98.3 °F (36.8 °C)] 98 °F (36.7 °C)  Pulse:  [] 94  Resp:  [15-56] 20  SpO2:  [95 %-100 %] 98 %  BP: (147-170)/() " 152/93     Weight: 74.8 kg (164 lb 14.5 oz)  Body mass index is 29.21 kg/m².    Physical Exam   Constitutional: She is oriented to person, place, and time. She appears well-developed and well-nourished. No distress.   HENT:   Head: Normocephalic and atraumatic.   Eyes: Pupils are equal, round, and reactive to light. Conjunctivae and EOM are normal. No scleral icterus.   Neck: Normal range of motion. Neck supple. No tracheal deviation present.   Cardiovascular: Normal rate, regular rhythm, normal heart sounds and intact distal pulses. Exam reveals no gallop and no friction rub.   No murmur heard.  Pulmonary/Chest: Effort normal and breath sounds normal. No stridor. No respiratory distress. She has no wheezes. She has no rales.   Abdominal: Soft. Bowel sounds are normal. She exhibits no distension. There is no tenderness. There is no guarding.   Neurological: She is alert and oriented to person, place, and time. No cranial nerve deficit. She exhibits normal muscle tone.   Skin: She is not diaphoretic.   Psychiatric: She has a normal mood and affect. Her behavior is normal. Judgment and thought content normal.   Nursing note and vitals reviewed.        CRANIAL NERVES     CN III, IV, VI   Pupils are equal, round, and reactive to light.  Extraocular motions are normal.        Significant Labs:   BMP:   Recent Labs   Lab 06/27/19  0348   GLU 85      K 3.8      CO2 22*   BUN 6   CREATININE 0.7   CALCIUM 8.8   MG 1.6     CBC:   Recent Labs   Lab 06/26/19  1833 06/27/19  0348   WBC 8.53 7.04   HGB 14.8 13.2   HCT 43.2 39.0    163     CMP:   Recent Labs   Lab 06/26/19  1934 06/27/19  0348    137   K 3.5 3.8   CL 99 101   CO2 19* 22*   GLU 94 85   BUN 6 6   CREATININE 0.7 0.7   CALCIUM 9.0 8.8   PROT 7.9  --    ALBUMIN 4.3  --    BILITOT 1.0  --    ALKPHOS 70  --    AST 84*  --    ALT 68*  --    ANIONGAP 20* 14   EGFRNONAA >60 >60     Urine Culture: No results for input(s): LABURIN in the last 48  hours.  Urine Studies:   Recent Labs   Lab 06/26/19  1938   COLORU Yellow   APPEARANCEUA Clear   PHUR >8.0*   SPECGRAV 1.015   PROTEINUA 2+*   GLUCUA Negative   KETONESU 2+*   BILIRUBINUA Negative   OCCULTUA 3+*   NITRITE Negative   UROBILINOGEN 1.0   LEUKOCYTESUR Trace*   RBCUA 80*   WBCUA 5   BACTERIA Occasional   SQUAMEPITHEL 16   HYALINECASTS 0     All pertinent labs within the past 24 hours have been reviewed.    Significant Imaging: I have reviewed all pertinent imaging results/findings within the past 24 hours.   Imaging Results    None          Assessment/Plan:     * Alcohol withdrawal  - Ms. Esther Romo is admitted to inpatient status   - she has been drinking whiskey and wine daily x 3 weeks, and started decreasing her intake 2-3 days ago  - she now has withdrawal symptoms   - CIWA scale ordered  - PRN supportive medications ordered   - monitor and replace lytes PRN   - hydration w/ D5 after banana bag   - daily vitamins/supplements ordered   - psyllium and lactobacillus for loose stools ordered   - FOBT pending    Elevated liver enzymes  - suspect 2/2 alcohol intake  - acute hep panel ordered    VTE Risk Mitigation (From admission, onward)        Ordered     enoxaparin injection 40 mg  Daily      06/26/19 2342     IP VTE HIGH RISK PATIENT  Once      06/26/19 2342     Place sequential compression device  Until discontinued      06/26/19 2342             Gertrudis Sanchez PA-C  Department of Hospital Medicine   [facility]

## 2019-06-27 NOTE — NURSING
Notified Gertrudis AMEZQUITA of pt request for something to help with diarrhea. New orders for metamucil and lactobacillus, MD states she will put in and that she will be up to see pt.

## 2019-06-27 NOTE — PLAN OF CARE
LMSW met with patient at the bedside.      Patient is alert and oriented with no communication barriers.     Prior to admission patient was independent. Patient denies the use of HH or DME.    Patient does not have any insurance. Patient is not seeing a PCP. Patient will use bedside delivery for medications.     LMSW spoke to the patient about her alcohol use. Patient shared that a year ago she had a miscarriage and was unaware she was pregnant. Patient lost her job during this time as she was out of sick days and failed to report to work. Patient is aware of [facility] addiction programs. LMSW provided support and written resources for addictions and advance directives.     LMSW will continue to follow and support.        06/27/19 1105   Discharge Assessment   Assessment Type Discharge Planning Assessment   Confirmed/corrected address and phone number on facesheet? Yes   Assessment information obtained from? Patient   Communicated expected length of stay with patient/caregiver no   Prior to hospitilization cognitive status: Alert/Oriented   Prior to hospitalization functional status: Independent   Current cognitive status: Alert/Oriented   Current Functional Status: Independent   Lives With significant other;other (see comments)   Able to Return to Prior Arrangements yes   Is patient able to care for self after discharge? Yes   Patient's perception of discharge disposition home or selfcare   Readmission Within the Last 30 Days no previous admission in last 30 days   Patient currently being followed by outpatient case management? No   Patient currently receives any other outside agency services? No   Equipment Currently Used at Home none   Do you have any problems affording any of your prescribed medications? No   Is the patient taking medications as prescribed? yes   Does the patient have transportation home? Yes   Transportation Anticipated family or friend will provide   Does the patient receive services at the  [facility]? No   Discharge Plan A Home   Patient/Family in Agreement with Plan yes

## 2019-06-27 NOTE — PLAN OF CARE
Problem: Adult Inpatient Plan of Care  Goal: Plan of Care Review  Outcome: Ongoing (interventions implemented as appropriate)  Pt admitted with active ETOH withdraw with tachycardia, tremors, sweating, nausea and diarrhea, and mild anxiety. Pt symptoms being managed with prn IV meds and IV hydration with some improvement as pt states she feels a little better. Pt has had several bowel movements and an occult stool was sent to lab per order. Pt able to ambulate to bathroom, gait steady. Pt cooperative with safety measures and verbalizes understanding of plan of care. Case management consult entered per pt request for advanced directive guidance. Pt is eager to quit drinking and also requests to see hospital  for further support.

## 2019-06-28 VITALS
RESPIRATION RATE: 21 BRPM | WEIGHT: 164.88 LBS | OXYGEN SATURATION: 98 % | TEMPERATURE: 98 F | HEIGHT: 63 IN | HEART RATE: 80 BPM | DIASTOLIC BLOOD PRESSURE: 104 MMHG | BODY MASS INDEX: 29.21 KG/M2 | SYSTOLIC BLOOD PRESSURE: 152 MMHG

## 2019-06-28 LAB
ALBUMIN SERPL BCP-MCNC: 3.5 G/DL (ref 3.5–5.2)
ALP SERPL-CCNC: 65 U/L (ref 55–135)
ALT SERPL W/O P-5'-P-CCNC: 52 U/L (ref 10–44)
ANION GAP SERPL CALC-SCNC: 8 MMOL/L (ref 8–16)
AST SERPL-CCNC: 58 U/L (ref 10–40)
BILIRUB SERPL-MCNC: 1.1 MG/DL (ref 0.1–1)
BUN SERPL-MCNC: 3 MG/DL (ref 6–20)
CALCIUM SERPL-MCNC: 8.8 MG/DL (ref 8.7–10.5)
CHLORIDE SERPL-SCNC: 105 MMOL/L (ref 95–110)
CO2 SERPL-SCNC: 26 MMOL/L (ref 23–29)
CREAT SERPL-MCNC: 0.6 MG/DL (ref 0.5–1.4)
EST. GFR  (AFRICAN AMERICAN): >60 ML/MIN/1.73 M^2
EST. GFR  (NON AFRICAN AMERICAN): >60 ML/MIN/1.73 M^2
FOLATE SERPL-MCNC: >40 NG/ML (ref 4–24)
GLUCOSE SERPL-MCNC: 120 MG/DL (ref 70–110)
HAV IGM SERPL QL IA: NEGATIVE
HBV CORE IGM SERPL QL IA: NEGATIVE
HBV SURFACE AG SERPL QL IA: NEGATIVE
HCV AB SERPL QL IA: NEGATIVE
MAGNESIUM SERPL-MCNC: 1.6 MG/DL (ref 1.6–2.6)
PHOSPHATE SERPL-MCNC: 3.6 MG/DL (ref 2.7–4.5)
POTASSIUM SERPL-SCNC: 3.9 MMOL/L (ref 3.5–5.1)
PROT SERPL-MCNC: 6.8 G/DL (ref 6–8.4)
SODIUM SERPL-SCNC: 139 MMOL/L (ref 136–145)

## 2019-06-28 PROCEDURE — 99239 PR HOSPITAL DISCHARGE DAY,>30 MIN: ICD-10-PCS | Mod: ,,, | Performed by: INTERNAL MEDICINE

## 2019-06-28 PROCEDURE — 25000003 PHARM REV CODE 250: Performed by: INTERNAL MEDICINE

## 2019-06-28 PROCEDURE — 25000003 PHARM REV CODE 250: Performed by: PHYSICIAN ASSISTANT

## 2019-06-28 PROCEDURE — 36415 COLL VENOUS BLD VENIPUNCTURE: CPT

## 2019-06-28 PROCEDURE — 94761 N-INVAS EAR/PLS OXIMETRY MLT: CPT

## 2019-06-28 PROCEDURE — 99239 HOSP IP/OBS DSCHRG MGMT >30: CPT | Mod: ,,, | Performed by: INTERNAL MEDICINE

## 2019-06-28 PROCEDURE — 84100 ASSAY OF PHOSPHORUS: CPT

## 2019-06-28 PROCEDURE — 83735 ASSAY OF MAGNESIUM: CPT

## 2019-06-28 PROCEDURE — 80053 COMPREHEN METABOLIC PANEL: CPT

## 2019-06-28 RX ORDER — ALPRAZOLAM 0.5 MG/1
0.5 TABLET ORAL 2 TIMES DAILY PRN
Qty: 8 TABLET | Refills: 0 | Status: SHIPPED | OUTPATIENT
Start: 2019-06-28

## 2019-06-28 RX ORDER — RAMELTEON 8 MG/1
8 TABLET ORAL ONCE
Status: COMPLETED | OUTPATIENT
Start: 2019-06-28 | End: 2019-06-28

## 2019-06-28 RX ORDER — AMLODIPINE BESYLATE 5 MG/1
5 TABLET ORAL DAILY
Qty: 30 TABLET | Refills: 1 | Status: SHIPPED | OUTPATIENT
Start: 2019-06-29

## 2019-06-28 RX ORDER — ESCITALOPRAM OXALATE 10 MG/1
10 TABLET ORAL DAILY
Qty: 30 TABLET | Refills: 0 | Status: SHIPPED | OUTPATIENT
Start: 2019-06-28

## 2019-06-28 RX ORDER — LANOLIN ALCOHOL/MO/W.PET/CERES
100 CREAM (GRAM) TOPICAL DAILY
COMMUNITY
Start: 2019-06-29

## 2019-06-28 RX ADMIN — DIAZEPAM 5 MG: 5 TABLET ORAL at 06:06

## 2019-06-28 RX ADMIN — POTASSIUM & SODIUM PHOSPHATES POWDER PACK 280-160-250 MG 1 PACKET: 280-160-250 PACK at 08:06

## 2019-06-28 RX ADMIN — Medication 400 MG: at 08:06

## 2019-06-28 RX ADMIN — Medication 100 MG: at 08:06

## 2019-06-28 RX ADMIN — THERA TABS 1 TABLET: TAB at 08:06

## 2019-06-28 RX ADMIN — PANTOPRAZOLE SODIUM 40 MG: 40 TABLET, DELAYED RELEASE ORAL at 08:06

## 2019-06-28 RX ADMIN — DEXTROSE AND SODIUM CHLORIDE: 5; .9 INJECTION, SOLUTION INTRAVENOUS at 02:06

## 2019-06-28 RX ADMIN — AMLODIPINE BESYLATE 5 MG: 5 TABLET ORAL at 08:06

## 2019-06-28 RX ADMIN — RAMELTEON 8 MG: 8 TABLET, FILM COATED ORAL at 12:06

## 2019-06-28 NOTE — PLAN OF CARE
Problem: Adult Inpatient Plan of Care  Goal: Plan of Care Review  Outcome: Ongoing (interventions implemented as appropriate)  Patient still anxious, and having tremors.  States she is having very little diarrhea, and is voiding without difficulty. Menstruating at present.  Up to commode with minimal assistance. No seizure activity noted.  Upset and crying this a.m. After phone call with khanh.  Emotional support given.  VSS. D5NS at 125cc/hr.  Free from falls and injury.

## 2019-06-28 NOTE — PLAN OF CARE
LMSW met with the patient at the bedside. Financial assistance information given to patient.     CM needs addressed.      06/28/19 1105   Final Note   Assessment Type Final Discharge Note   Anticipated Discharge Disposition Home   What phone number can be called within the next 1-3 days to see how you are doing after discharge? [phone]   Right Care Referral Info   Post Acute Recommendation No Care

## 2019-06-28 NOTE — NURSING
Pt in NAD, denies pain or SOB.  Mild tremors noted, Dr. Dillon aware.  Patient educated on discharge instructions and verbalized understanding.  All questions answered to patient's satisfaction. Both  IVs removed without complication.  Patient refused transport; walked off unit by RN.

## 2019-07-09 PROBLEM — F32.A DEPRESSION: Status: ACTIVE | Noted: 2019-07-09

## 2019-07-09 PROBLEM — R19.7 DIARRHEA: Status: ACTIVE | Noted: 2019-07-09

## 2019-07-09 PROBLEM — F10.10 ALCOHOL ABUSE: Status: ACTIVE | Noted: 2019-07-09

## 2019-07-09 NOTE — ASSESSMENT & PLAN NOTE
- Ms. Esther Romo is admitted to inpatient status   - she has been drinking whiskey and wine daily x 3 weeks, and started decreasing her intake 2-3 days ago  - she now has withdrawal symptoms   - improved with scheduled valium  - ivf  - daily vitamins/supplements ordered   - FOBT negative

## 2019-07-09 NOTE — DISCHARGE SUMMARY
[facility] Hospital Medicine  Discharge Summary      Patient Name: Esther Romo  MRN: [id number]  Admission Date: 6/26/2019  Hospital Length of Stay: 2 days  Discharge Date and Time: 6/28/2019 11:01 AM  Attending Physician: Lanie att. providers found   Discharging Provider: Shaheed Dillon MD  Primary Care Provider: Primary Doctor Lanie      HPI:   Ms. Esther Romo is a 29 y.o. female, with PMH of HTN, anxiety, alcoholism, who presented to [facility] ED on 6/26/19 2/2 alcohol withdrawal symptoms. She indicated her last drink was ~18-20 hours PTA. She had been drinking a pint whiskey and a bottle wine daily x 3 weeks. She has been decreasing her intake over the past few days. She states she has been drinking since she was 12 years old. Associated symptoms include generalized headache, palpitations, anxiety, tremors, lower abdominal pain, nausea, vomiting, and black stools. She was evaluated in the ED and admitted to inpatient status to the ICU.       * No surgery found *      Hospital Course:   Patient was started on scheduled valium.Her nausea, vomiting improved , was tolerating diet .Withdrawals were improved and she was discharged on few doses of xanax.Advised to stop alcohol, outpatient resources given.She is in process of moving her apartment over the weekend.She was also started on antidepressant.     Consults:   Consults (From admission, onward)        Status Ordering Provider     Inpatient consult to Registered Dietitian/Nutritionist  Once     Provider:  (Not yet assigned)    Completed SHELLY WHITTAKER     Inpatient consult to Social Work  Once     Provider:  (Not yet assigned)    Completed SHELLY WHITTAKER     Inpatient consult to Social Work/Case Management  Once     Provider:  (Not yet assigned)    Completed SHAHEED DILLON          * Alcohol withdrawal  - Ms. Esther Romo is admitted to inpatient status   - she has been drinking whiskey and wine daily x 3 weeks,  and started decreasing her intake 2-3 days ago  - she now has withdrawal symptoms   - improved with scheduled valium  - ivf  - daily vitamins/supplements ordered   - FOBT negative    Depression  lexapro on dc      Alcohol abuse  Advised to quit        Diarrhea  Likely due to withdrawal  Prn imodium  Improved  obs negative      Elevated liver enzymes  - suspect 2/2 alcohol intake  - acute hep panel negative      Essential Hypertension  Restarted norvasc 5 mg daily      Final Active Diagnoses:    Diagnosis Date Noted POA    PRINCIPAL PROBLEM:  Alcohol withdrawal [F10.239] 06/26/2019 Yes    Diarrhea [R19.7] 07/09/2019 Yes    Alcohol abuse [F10.10] 07/09/2019 Yes    Depression [F32.9] 07/09/2019 Yes    Elevated liver enzymes [R74.8] 06/27/2019 Yes      Problems Resolved During this Admission:       Discharged Condition: stable    Disposition: Home or Self Care    Follow Up:  Follow-up Information     [facility]    Contact information:  [address]  [facility] [id number] [phone]                 Patient Instructions:      Diet Adult Regular     Activity as tolerated       Significant Diagnostic Studies:   Lab Results   Component Value Date    WBC 7.04 06/27/2019    HGB 13.2 06/27/2019    HCT 39.0 06/27/2019    MCV 92 06/27/2019     06/27/2019     BMP  Lab Results   Component Value Date     06/28/2019    K 3.9 06/28/2019     06/28/2019    CO2 26 06/28/2019    BUN 3 (L) 06/28/2019    CREATININE 0.6 06/28/2019    CALCIUM 8.8 06/28/2019    ANIONGAP 8 06/28/2019    ESTGFRAFRICA >60 06/28/2019    EGFRNONAA >60 06/28/2019         Pending Diagnostic Studies:     None         Medications:  Reconciled Home Medications:      Medication List      START taking these medications    ALPRAZolam 0.5 MG tablet  Commonly known as:  XANAX  Take 1 tablet (0.5 mg total) by mouth 2 (two) times daily as needed for Anxiety.     escitalopram oxalate 10 MG tablet  Commonly known as:   LEXAPRO  Take 1 tablet (10 mg total) by mouth once daily.     thiamine 100 MG tablet  Take 1 tablet (100 mg total) by mouth once daily.        CHANGE how you take these medications    amLODIPine 5 MG tablet  Commonly known as:  NORVASC  Take 1 tablet (5 mg total) by mouth once daily.  What changed:    · medication strength  · how much to take        CONTINUE taking these medications    diphenhydrAMINE 50 MG capsule  Commonly known as:  BENADRYL  Take 50 mg by mouth nightly as needed for Insomnia.     VITAMIN B-12 500 MCG tablet  Generic drug:  cyanocobalamin  Take 500 mcg by mouth once daily.     WOMEN'S MULTIVITAMIN GUMMIES ORAL  Take by mouth once daily.        STOP taking these medications    POTASSIUM ORAL            Indwelling Lines/Drains at time of discharge:   Lines/Drains/Airways          None          Time spent on the discharge of patient: 35  minutes  Patient was seen and examined on the date of discharge and determined to be suitable for discharge.         Alla Dillon MD  Department of Hospital Medicine  [facility]

## 2021-04-28 ENCOUNTER — PATIENT MESSAGE (OUTPATIENT)
Dept: RESEARCH | Facility: HOSPITAL | Age: 31
End: 2021-04-28

## 2022-05-24 NOTE — HOSPITAL COURSE
Patient was started on scheduled valium.Her nausea, vomiting improved , was tolerating diet .Withdrawals were improved and she was discharged on few doses of xanax.Advised to stop alcohol, outpatient resources given.She is in process of moving her apartment over the weekend.She was also started on antidepressant.  
No

## 2023-12-20 ENCOUNTER — OFFICE VISIT (OUTPATIENT)
Dept: FAMILY MEDICINE | Facility: CLINIC | Age: 33
End: 2023-12-20
Payer: COMMERCIAL

## 2023-12-20 VITALS
BODY MASS INDEX: 33.19 KG/M2 | DIASTOLIC BLOOD PRESSURE: 89 MMHG | OXYGEN SATURATION: 100 % | WEIGHT: 187.38 LBS | SYSTOLIC BLOOD PRESSURE: 140 MMHG | HEART RATE: 114 BPM

## 2023-12-20 DIAGNOSIS — R05.9 COUGH, UNSPECIFIED TYPE: Primary | ICD-10-CM

## 2023-12-20 DIAGNOSIS — B96.89 ACUTE BACTERIAL SINUSITIS: ICD-10-CM

## 2023-12-20 DIAGNOSIS — I10 HYPERTENSION, UNSPECIFIED TYPE: ICD-10-CM

## 2023-12-20 DIAGNOSIS — J01.90 ACUTE BACTERIAL SINUSITIS: ICD-10-CM

## 2023-12-20 LAB
CTP QC/QA: YES
SARS-COV-2 RDRP RESP QL NAA+PROBE: NEGATIVE

## 2023-12-20 PROCEDURE — 1160F PR REVIEW ALL MEDS BY PRESCRIBER/CLIN PHARMACIST DOCUMENTED: ICD-10-PCS | Mod: CPTII,S$GLB,, | Performed by: FAMILY MEDICINE

## 2023-12-20 PROCEDURE — 1159F MED LIST DOCD IN RCRD: CPT | Mod: CPTII,S$GLB,, | Performed by: FAMILY MEDICINE

## 2023-12-20 PROCEDURE — 3079F DIAST BP 80-89 MM HG: CPT | Mod: CPTII,S$GLB,, | Performed by: FAMILY MEDICINE

## 2023-12-20 PROCEDURE — 3008F PR BODY MASS INDEX (BMI) DOCUMENTED: ICD-10-PCS | Mod: CPTII,S$GLB,, | Performed by: FAMILY MEDICINE

## 2023-12-20 PROCEDURE — 3079F PR MOST RECENT DIASTOLIC BLOOD PRESSURE 80-89 MM HG: ICD-10-PCS | Mod: CPTII,S$GLB,, | Performed by: FAMILY MEDICINE

## 2023-12-20 PROCEDURE — 3077F SYST BP >= 140 MM HG: CPT | Mod: CPTII,S$GLB,, | Performed by: FAMILY MEDICINE

## 2023-12-20 PROCEDURE — 1160F RVW MEDS BY RX/DR IN RCRD: CPT | Mod: CPTII,S$GLB,, | Performed by: FAMILY MEDICINE

## 2023-12-20 PROCEDURE — 87635: ICD-10-PCS | Mod: QW,S$GLB,, | Performed by: FAMILY MEDICINE

## 2023-12-20 PROCEDURE — 3008F BODY MASS INDEX DOCD: CPT | Mod: CPTII,S$GLB,, | Performed by: FAMILY MEDICINE

## 2023-12-20 PROCEDURE — 99999 PR PBB SHADOW E&M-EST. PATIENT-LVL III: CPT | Mod: PBBFAC,,, | Performed by: FAMILY MEDICINE

## 2023-12-20 PROCEDURE — 99999 PR PBB SHADOW E&M-EST. PATIENT-LVL III: ICD-10-PCS | Mod: PBBFAC,,, | Performed by: FAMILY MEDICINE

## 2023-12-20 PROCEDURE — 87635 SARS-COV-2 COVID-19 AMP PRB: CPT | Mod: QW,S$GLB,, | Performed by: FAMILY MEDICINE

## 2023-12-20 PROCEDURE — 1159F PR MEDICATION LIST DOCUMENTED IN MEDICAL RECORD: ICD-10-PCS | Mod: CPTII,S$GLB,, | Performed by: FAMILY MEDICINE

## 2023-12-20 PROCEDURE — 3077F PR MOST RECENT SYSTOLIC BLOOD PRESSURE >= 140 MM HG: ICD-10-PCS | Mod: CPTII,S$GLB,, | Performed by: FAMILY MEDICINE

## 2023-12-20 PROCEDURE — 99204 OFFICE O/P NEW MOD 45 MIN: CPT | Mod: S$GLB,,, | Performed by: FAMILY MEDICINE

## 2023-12-20 PROCEDURE — 99204 PR OFFICE/OUTPT VISIT, NEW, LEVL IV, 45-59 MIN: ICD-10-PCS | Mod: S$GLB,,, | Performed by: FAMILY MEDICINE

## 2023-12-20 RX ORDER — VENLAFAXINE HYDROCHLORIDE 150 MG/1
150 CAPSULE, EXTENDED RELEASE ORAL DAILY
COMMUNITY

## 2023-12-20 RX ORDER — AMLODIPINE BESYLATE 10 MG/1
10 TABLET ORAL DAILY
COMMUNITY

## 2023-12-20 RX ORDER — AMOXICILLIN AND CLAVULANATE POTASSIUM 875; 125 MG/1; MG/1
1 TABLET, FILM COATED ORAL EVERY 12 HOURS
Qty: 14 TABLET | Refills: 0 | Status: SHIPPED | OUTPATIENT
Start: 2023-12-20 | End: 2023-12-27

## 2023-12-20 RX ORDER — TRAZODONE HYDROCHLORIDE 50 MG/1
50 TABLET ORAL NIGHTLY
COMMUNITY

## 2023-12-20 RX ORDER — HYDROCHLOROTHIAZIDE 12.5 MG/1
12.5 TABLET ORAL DAILY
COMMUNITY

## 2023-12-20 RX ORDER — BENZONATATE 200 MG/1
200 CAPSULE ORAL 3 TIMES DAILY PRN
Qty: 30 CAPSULE | Refills: 0 | Status: SHIPPED | OUTPATIENT
Start: 2023-12-20 | End: 2023-12-30

## 2023-12-20 RX ORDER — BUSPIRONE HYDROCHLORIDE 5 MG/1
5 TABLET ORAL 2 TIMES DAILY
COMMUNITY

## 2023-12-20 NOTE — PROGRESS NOTES
"Chief Complaint:    Chief Complaint   Patient presents with    Cough     cough       History of Present Illness:  Patient presents today for cough,    Testing for COVID.    Persistent productive cough for a month, sore throat, fatigue, sinus pressure for a few weeks. Denies any fever, CP, or SOB.  She has recently quit smoking.     Is on Amlodipine and HCTZ for HTN. Checks at home regularly and is stable.      ROS:  Review of Systems   Constitutional:  Positive for fatigue. Negative for appetite change, chills and fever.   HENT:  Positive for sinus pressure and sore throat. Negative for congestion, ear pain, postnasal drip, rhinorrhea and sinus pain.    Eyes:  Negative for pain.   Respiratory:  Positive for cough. Negative for chest tightness and shortness of breath.    Cardiovascular:  Negative for chest pain and palpitations.   Gastrointestinal:  Negative for abdominal pain, blood in stool, constipation, diarrhea and nausea.   Genitourinary:  Negative for difficulty urinating, dysuria, flank pain and hematuria.   Musculoskeletal:  Negative for arthralgias, back pain and myalgias.   Skin:  Negative for pallor and wound.   Neurological:  Negative for dizziness, tremors, speech difficulty, light-headedness and headaches.   Psychiatric/Behavioral:  Negative for behavioral problems, dysphoric mood and sleep disturbance.    All other systems reviewed and are negative.      Past Medical History:   Diagnosis Date    Anxiety     Hypertension     Miscarriage        Social History:  Social History     Socioeconomic History    Marital status: Single   Tobacco Use    Smoking status: Every Day     Current packs/day: 0.50     Types: Cigarettes    Smokeless tobacco: Never   Substance and Sexual Activity    Alcohol use: Yes     Comment: "I'm an alcoholic"    Drug use: Yes     Types: Cocaine, Marijuana     Comment: ocassionally       Family History:   family history includes No Known Problems in her father and mother.    Health " Maintenance   Topic Date Due    Lipid Panel  Never done    TETANUS VACCINE  Never done    Hepatitis C Screening  Completed       Physical Exam:    Vital Signs  Pulse: (!) 114  SpO2: 100 %  BP: (!) 140/89  BP Location: Left arm  Patient Position: Sitting  Pain Score: 0-No pain  Height and Weight  Weight: 85 kg (187 lb 6.3 oz)]    Body mass index is 33.19 kg/m².    Physical Exam  Vitals and nursing note reviewed.   Constitutional:       Appearance: Normal appearance. She is not toxic-appearing.   HENT:      Head: Normocephalic and atraumatic.      Salivary Glands: Left salivary gland is tender (submandibular).        Right Ear: Tympanic membrane normal.      Left Ear: Tympanic membrane normal.      Nose:      Right Sinus: Maxillary sinus tenderness present. No frontal sinus tenderness.      Left Sinus: Maxillary sinus tenderness present. No frontal sinus tenderness.      Mouth/Throat:      Pharynx: Posterior oropharyngeal erythema present.   Eyes:      Extraocular Movements: Extraocular movements intact.      Pupils: Pupils are equal, round, and reactive to light.   Cardiovascular:      Rate and Rhythm: Normal rate and regular rhythm.      Heart sounds: Normal heart sounds.   Pulmonary:      Effort: Pulmonary effort is normal.      Breath sounds: Normal breath sounds. No wheezing, rhonchi or rales.   Abdominal:      General: Bowel sounds are normal. There is no distension.      Palpations: Abdomen is soft.      Tenderness: There is no abdominal tenderness.   Musculoskeletal:         General: Normal range of motion.      Cervical back: Normal range of motion.      Lumbar back: No tenderness.   Skin:     General: Skin is warm and dry.      Capillary Refill: Capillary refill takes less than 2 seconds.   Neurological:      General: No focal deficit present.      Mental Status: She is alert and oriented to person, place, and time.   Psychiatric:         Mood and Affect: Mood normal.         Behavior: Behavior normal.        "  Judgment: Judgment normal.           Diabetes Management Status    Statin: Not taking  ACE/ARB: Not taking    Screening or Prevention Patient's value Goal Complete/Controlled?   HgA1C Testing and Control   Lab Results   Component Value Date    HGBA1C 5.3 06/11/2020      Annually/Less than 8% No   Lipid profile Most Recent Lipid Panel Health Maintenance Topic Completion: Not Found Annually No   LDL control No results found for: "LDLCALC" Annually/Less than 100 mg/dl  No   Nephropathy screening No results found for: "LABMICR"  Lab Results   Component Value Date    PROTEINUA 2+ (A) 06/26/2019    Annually No   Blood pressure BP Readings from Last 1 Encounters:   12/20/23 (!) 140/89    Less than 140/90 Yes   Dilated retinal exam Most Recent Eye Exam Date: Not Found Annually Yes   Foot exam   Most Recent Foot Exam Date: Not Found Annually Yes       Assessment:      ICD-10-CM ICD-9-CM   1. Cough, unspecified type  R05.9 786.2   2. Acute bacterial sinusitis  J01.90 461.9    B96.89    3. Hypertension, unspecified type  I10 401.9         Plan:  COVID negative.  Start Augmentin and Tessalon for acute bacterial sinusitis.    Monitor blood pressure carefully.  She will be establishing with a PCP in a few months.    Orders Placed This Encounter   Procedures    POCT COVID-19 Rapid Screening       Current Outpatient Medications   Medication Sig Dispense Refill    amLODIPine (NORVASC) 10 MG tablet Take 10 mg by mouth once daily.      busPIRone (BUSPAR) 5 MG Tab Take 5 mg by mouth 2 (two) times daily.      hydroCHLOROthiazide (HYDRODIURIL) 12.5 MG Tab Take 12.5 mg by mouth once daily.      traZODone (DESYREL) 50 MG tablet Take 50 mg by mouth every evening.      venlafaxine (EFFEXOR-XR) 150 MG Cp24 Take 150 mg by mouth once daily.      ALPRAZolam (XANAX) 0.5 MG tablet Take 1 tablet (0.5 mg total) by mouth 2 (two) times daily as needed for Anxiety. (Patient not taking: Reported on 12/20/2023) 8 tablet 0    amLODIPine (NORVASC) 5 MG " tablet Take 1 tablet (5 mg total) by mouth once daily. (Patient not taking: Reported on 12/20/2023) 30 tablet 1    amoxicillin-clavulanate 875-125mg (AUGMENTIN) 875-125 mg per tablet Take 1 tablet by mouth every 12 (twelve) hours. for 7 days 14 tablet 0    benzonatate (TESSALON) 200 MG capsule Take 1 capsule (200 mg total) by mouth 3 (three) times daily as needed for Cough. 30 capsule 0    cyanocobalamin (VITAMIN B-12) 500 MCG tablet Take 500 mcg by mouth once daily.      diphenhydrAMINE (BENADRYL) 50 MG capsule Take 50 mg by mouth nightly as needed for Insomnia.      escitalopram oxalate (LEXAPRO) 10 MG tablet Take 1 tablet (10 mg total) by mouth once daily. (Patient not taking: Reported on 12/20/2023) 30 tablet 0    multivit-minerals/folic acid (WOMEN'S MULTIVITAMIN GUMMIES ORAL) Take by mouth once daily.      thiamine 100 MG tablet Take 1 tablet (100 mg total) by mouth once daily. (Patient not taking: Reported on 12/20/2023)       No current facility-administered medications for this visit.       There are no discontinued medications.    No follow-ups on file.      Norma Shine MD    Scribe Attestation:   I, Speedy Fleming, am scribing for, and in the presence of, Dr.Arif Shine I performed the above scribed service and the documentation accurately describes the services I performed. I attest to the accuracy of the note.    I, Dr. Norma Shine, reviewed documentation as scribed above. I performed the services described in this documentation.  I agree that the record reflects my personal performance and is accurate and complete. Norma Shine MD.  12/20/2023

## 2024-04-26 ENCOUNTER — HOSPITAL ENCOUNTER (OUTPATIENT)
Facility: HOSPITAL | Age: 34
Discharge: HOME | End: 2024-04-28
Attending: EMERGENCY MEDICINE | Admitting: INTERNAL MEDICINE
Payer: COMMERCIAL

## 2024-04-26 DIAGNOSIS — M25.571 ANKLE PAIN, RIGHT: ICD-10-CM

## 2024-04-26 DIAGNOSIS — R07.9 CHEST PAIN: ICD-10-CM

## 2024-04-26 DIAGNOSIS — F10.930 ALCOHOL WITHDRAWAL SYNDROME WITHOUT COMPLICATION: ICD-10-CM

## 2024-04-26 DIAGNOSIS — S82.891A CLOSED FRACTURE DISLOCATION OF RIGHT ANKLE JOINT, INITIAL ENCOUNTER: Primary | ICD-10-CM

## 2024-04-26 DIAGNOSIS — F10.10 ALCOHOL ABUSE: ICD-10-CM

## 2024-04-26 DIAGNOSIS — S82.891A CLOSED FRACTURE DISLOCATION OF RIGHT ANKLE, INITIAL ENCOUNTER: ICD-10-CM

## 2024-04-26 DIAGNOSIS — Z01.810 PREOP CARDIOVASCULAR EXAM: ICD-10-CM

## 2024-04-26 LAB
ALBUMIN SERPL BCP-MCNC: 4 G/DL (ref 3.5–5.2)
ALP SERPL-CCNC: 82 U/L (ref 55–135)
ALT SERPL W/O P-5'-P-CCNC: 25 U/L (ref 10–44)
AMPHET+METHAMPHET UR QL: NEGATIVE
ANION GAP SERPL CALC-SCNC: 18 MMOL/L (ref 8–16)
APTT PPP: 28.5 SEC (ref 21–32)
AST SERPL-CCNC: 24 U/L (ref 10–40)
B-HCG UR QL: NEGATIVE
BACTERIA #/AREA URNS HPF: NORMAL /HPF
BARBITURATES UR QL SCN>200 NG/ML: NEGATIVE
BASOPHILS # BLD AUTO: 0.06 K/UL (ref 0–0.2)
BASOPHILS NFR BLD: 0.7 % (ref 0–1.9)
BENZODIAZ UR QL SCN>200 NG/ML: NEGATIVE
BILIRUB SERPL-MCNC: 0.4 MG/DL (ref 0.1–1)
BILIRUB UR QL STRIP: NEGATIVE
BUN SERPL-MCNC: 11 MG/DL (ref 6–20)
BZE UR QL SCN: NEGATIVE
CALCIUM SERPL-MCNC: 9.1 MG/DL (ref 8.7–10.5)
CANNABINOIDS UR QL SCN: NEGATIVE
CHLORIDE SERPL-SCNC: 99 MMOL/L (ref 95–110)
CLARITY UR: CLEAR
CO2 SERPL-SCNC: 21 MMOL/L (ref 23–29)
COLOR UR: YELLOW
CREAT SERPL-MCNC: 0.8 MG/DL (ref 0.5–1.4)
CREAT UR-MCNC: 186.7 MG/DL (ref 15–325)
DIFFERENTIAL METHOD BLD: ABNORMAL
EOSINOPHIL # BLD AUTO: 0 K/UL (ref 0–0.5)
EOSINOPHIL NFR BLD: 0 % (ref 0–8)
ERYTHROCYTE [DISTWIDTH] IN BLOOD BY AUTOMATED COUNT: 14.7 % (ref 11.5–14.5)
EST. GFR  (NO RACE VARIABLE): >60 ML/MIN/1.73 M^2
ETHANOL SERPL-MCNC: 376 MG/DL
GLUCOSE SERPL-MCNC: 109 MG/DL (ref 70–110)
GLUCOSE UR QL STRIP: NEGATIVE
HCT VFR BLD AUTO: 43 % (ref 37–48.5)
HCV AB SERPL QL IA: NEGATIVE
HEP C VIRUS HOLD SPECIMEN: NORMAL
HGB BLD-MCNC: 14.7 G/DL (ref 12–16)
HGB UR QL STRIP: NEGATIVE
HIV 1+2 AB+HIV1 P24 AG SERPL QL IA: NEGATIVE
HYALINE CASTS #/AREA URNS LPF: 0 /LPF
IMM GRANULOCYTES # BLD AUTO: 0.03 K/UL (ref 0–0.04)
IMM GRANULOCYTES NFR BLD AUTO: 0.3 % (ref 0–0.5)
INR PPP: 1 (ref 0.8–1.2)
KETONES UR QL STRIP: ABNORMAL
LEUKOCYTE ESTERASE UR QL STRIP: NEGATIVE
LYMPHOCYTES # BLD AUTO: 1.5 K/UL (ref 1–4.8)
LYMPHOCYTES NFR BLD: 16.9 % (ref 18–48)
MAGNESIUM SERPL-MCNC: 2.2 MG/DL (ref 1.6–2.6)
MCH RBC QN AUTO: 28.2 PG (ref 27–31)
MCHC RBC AUTO-ENTMCNC: 34.2 G/DL (ref 32–36)
MCV RBC AUTO: 83 FL (ref 82–98)
METHADONE UR QL SCN>300 NG/ML: NEGATIVE
MICROSCOPIC COMMENT: NORMAL
MONOCYTES # BLD AUTO: 0.2 K/UL (ref 0.3–1)
MONOCYTES NFR BLD: 2.5 % (ref 4–15)
NEUTROPHILS # BLD AUTO: 7.3 K/UL (ref 1.8–7.7)
NEUTROPHILS NFR BLD: 79.6 % (ref 38–73)
NITRITE UR QL STRIP: NEGATIVE
NRBC BLD-RTO: 0 /100 WBC
OPIATES UR QL SCN: NEGATIVE
PCP UR QL SCN>25 NG/ML: NEGATIVE
PH UR STRIP: 6 [PH] (ref 5–8)
PLATELET # BLD AUTO: 377 K/UL (ref 150–450)
PMV BLD AUTO: 9.5 FL (ref 9.2–12.9)
POTASSIUM SERPL-SCNC: 4.5 MMOL/L (ref 3.5–5.1)
PROT SERPL-MCNC: 8.1 G/DL (ref 6–8.4)
PROT UR QL STRIP: ABNORMAL
PROTHROMBIN TIME: 10.5 SEC (ref 9–12.5)
RBC # BLD AUTO: 5.21 M/UL (ref 4–5.4)
RBC #/AREA URNS HPF: 0 /HPF (ref 0–4)
SODIUM SERPL-SCNC: 138 MMOL/L (ref 136–145)
SP GR UR STRIP: 1.03 (ref 1–1.03)
SQUAMOUS #/AREA URNS HPF: 2 /HPF
TOXICOLOGY INFORMATION: NORMAL
URN SPEC COLLECT METH UR: ABNORMAL
UROBILINOGEN UR STRIP-ACNC: NEGATIVE EU/DL
WBC # BLD AUTO: 9.12 K/UL (ref 3.9–12.7)
WBC #/AREA URNS HPF: 2 /HPF (ref 0–5)

## 2024-04-26 PROCEDURE — 96366 THER/PROPH/DIAG IV INF ADDON: CPT | Mod: 59

## 2024-04-26 PROCEDURE — 86803 HEPATITIS C AB TEST: CPT | Performed by: EMERGENCY MEDICINE

## 2024-04-26 PROCEDURE — 96365 THER/PROPH/DIAG IV INF INIT: CPT

## 2024-04-26 PROCEDURE — 85025 COMPLETE CBC W/AUTO DIFF WBC: CPT | Performed by: EMERGENCY MEDICINE

## 2024-04-26 PROCEDURE — 99285 EMERGENCY DEPT VISIT HI MDM: CPT | Mod: 57,,, | Performed by: PHYSICIAN ASSISTANT

## 2024-04-26 PROCEDURE — 63600175 PHARM REV CODE 636 W HCPCS: Performed by: INTERNAL MEDICINE

## 2024-04-26 PROCEDURE — 80307 DRUG TEST PRSMV CHEM ANLYZR: CPT | Performed by: INTERNAL MEDICINE

## 2024-04-26 PROCEDURE — G0378 HOSPITAL OBSERVATION PER HR: HCPCS

## 2024-04-26 PROCEDURE — 25000003 PHARM REV CODE 250: Performed by: INTERNAL MEDICINE

## 2024-04-26 PROCEDURE — 99285 EMERGENCY DEPT VISIT HI MDM: CPT | Mod: 25

## 2024-04-26 PROCEDURE — 85730 THROMBOPLASTIN TIME PARTIAL: CPT | Performed by: EMERGENCY MEDICINE

## 2024-04-26 PROCEDURE — 96375 TX/PRO/DX INJ NEW DRUG ADDON: CPT

## 2024-04-26 PROCEDURE — 96372 THER/PROPH/DIAG INJ SC/IM: CPT | Mod: 59 | Performed by: INTERNAL MEDICINE

## 2024-04-26 PROCEDURE — 25000003 PHARM REV CODE 250: Performed by: EMERGENCY MEDICINE

## 2024-04-26 PROCEDURE — 96367 TX/PROPH/DG ADDL SEQ IV INF: CPT

## 2024-04-26 PROCEDURE — 87389 HIV-1 AG W/HIV-1&-2 AB AG IA: CPT | Performed by: EMERGENCY MEDICINE

## 2024-04-26 PROCEDURE — 81025 URINE PREGNANCY TEST: CPT | Performed by: INTERNAL MEDICINE

## 2024-04-26 PROCEDURE — 93010 ELECTROCARDIOGRAM REPORT: CPT | Mod: ,,, | Performed by: INTERNAL MEDICINE

## 2024-04-26 PROCEDURE — 96376 TX/PRO/DX INJ SAME DRUG ADON: CPT

## 2024-04-26 PROCEDURE — 93005 ELECTROCARDIOGRAM TRACING: CPT

## 2024-04-26 PROCEDURE — 81000 URINALYSIS NONAUTO W/SCOPE: CPT | Mod: 59 | Performed by: INTERNAL MEDICINE

## 2024-04-26 PROCEDURE — 63600175 PHARM REV CODE 636 W HCPCS: Performed by: EMERGENCY MEDICINE

## 2024-04-26 PROCEDURE — 82077 ASSAY SPEC XCP UR&BREATH IA: CPT | Performed by: EMERGENCY MEDICINE

## 2024-04-26 PROCEDURE — 83735 ASSAY OF MAGNESIUM: CPT | Performed by: EMERGENCY MEDICINE

## 2024-04-26 PROCEDURE — 80053 COMPREHEN METABOLIC PANEL: CPT | Performed by: EMERGENCY MEDICINE

## 2024-04-26 PROCEDURE — 85610 PROTHROMBIN TIME: CPT | Performed by: EMERGENCY MEDICINE

## 2024-04-26 RX ORDER — GLUCAGON 1 MG
1 KIT INJECTION
Status: DISCONTINUED | OUTPATIENT
Start: 2024-04-26 | End: 2024-04-28 | Stop reason: HOSPADM

## 2024-04-26 RX ORDER — LIDOCAINE HYDROCHLORIDE 10 MG/ML
10 INJECTION, SOLUTION EPIDURAL; INFILTRATION; INTRACAUDAL; PERINEURAL
Status: COMPLETED | OUTPATIENT
Start: 2024-04-26 | End: 2024-04-26

## 2024-04-26 RX ORDER — TRAZODONE HYDROCHLORIDE 50 MG/1
50 TABLET ORAL NIGHTLY
Status: DISCONTINUED | OUTPATIENT
Start: 2024-04-26 | End: 2024-04-28 | Stop reason: HOSPADM

## 2024-04-26 RX ORDER — ONDANSETRON HYDROCHLORIDE 2 MG/ML
4 INJECTION, SOLUTION INTRAVENOUS EVERY 8 HOURS PRN
Status: DISCONTINUED | OUTPATIENT
Start: 2024-04-26 | End: 2024-04-28 | Stop reason: HOSPADM

## 2024-04-26 RX ORDER — HYDROCHLOROTHIAZIDE 12.5 MG/1
12.5 CAPSULE ORAL DAILY
COMMUNITY
Start: 2024-03-27 | End: 2024-04-26 | Stop reason: ALTCHOICE

## 2024-04-26 RX ORDER — SODIUM CHLORIDE 0.9 % (FLUSH) 0.9 %
3 SYRINGE (ML) INJECTION EVERY 12 HOURS PRN
Status: DISCONTINUED | OUTPATIENT
Start: 2024-04-26 | End: 2024-04-28 | Stop reason: HOSPADM

## 2024-04-26 RX ORDER — OXYCODONE HYDROCHLORIDE 5 MG/1
10 TABLET ORAL EVERY 6 HOURS PRN
Status: DISCONTINUED | OUTPATIENT
Start: 2024-04-26 | End: 2024-04-28 | Stop reason: HOSPADM

## 2024-04-26 RX ORDER — MORPHINE SULFATE 4 MG/ML
4 INJECTION, SOLUTION INTRAMUSCULAR; INTRAVENOUS EVERY 6 HOURS PRN
Status: DISCONTINUED | OUTPATIENT
Start: 2024-04-26 | End: 2024-04-28 | Stop reason: HOSPADM

## 2024-04-26 RX ORDER — SIMETHICONE 80 MG
1 TABLET,CHEWABLE ORAL 4 TIMES DAILY PRN
Status: DISCONTINUED | OUTPATIENT
Start: 2024-04-26 | End: 2024-04-28 | Stop reason: HOSPADM

## 2024-04-26 RX ORDER — POLYETHYLENE GLYCOL 3350 17 G/17G
17 POWDER, FOR SOLUTION ORAL DAILY
Status: DISCONTINUED | OUTPATIENT
Start: 2024-04-26 | End: 2024-04-28 | Stop reason: HOSPADM

## 2024-04-26 RX ORDER — TRAZODONE HYDROCHLORIDE 100 MG/1
100 TABLET ORAL NIGHTLY
Status: ON HOLD | COMMUNITY
Start: 2024-04-02 | End: 2024-04-28 | Stop reason: HOSPADM

## 2024-04-26 RX ORDER — KETOROLAC TROMETHAMINE 30 MG/ML
30 INJECTION, SOLUTION INTRAMUSCULAR; INTRAVENOUS
Status: COMPLETED | OUTPATIENT
Start: 2024-04-26 | End: 2024-04-26

## 2024-04-26 RX ORDER — BUSPIRONE HYDROCHLORIDE 10 MG/1
10 TABLET ORAL 2 TIMES DAILY
Status: DISCONTINUED | OUTPATIENT
Start: 2024-04-26 | End: 2024-04-28 | Stop reason: HOSPADM

## 2024-04-26 RX ORDER — BUSPIRONE HYDROCHLORIDE 10 MG/1
10 TABLET ORAL 2 TIMES DAILY
Status: ON HOLD | COMMUNITY
Start: 2024-04-02 | End: 2024-04-28 | Stop reason: HOSPADM

## 2024-04-26 RX ORDER — DIAZEPAM 5 MG/1
10 TABLET ORAL EVERY 8 HOURS
Status: DISCONTINUED | OUTPATIENT
Start: 2024-04-26 | End: 2024-04-28

## 2024-04-26 RX ORDER — ACETAMINOPHEN 325 MG/1
650 TABLET ORAL EVERY 8 HOURS PRN
Status: DISCONTINUED | OUTPATIENT
Start: 2024-04-26 | End: 2024-04-28 | Stop reason: HOSPADM

## 2024-04-26 RX ORDER — BUPIVACAINE HYDROCHLORIDE 5 MG/ML
10 INJECTION, SOLUTION EPIDURAL; INTRACAUDAL
Status: COMPLETED | OUTPATIENT
Start: 2024-04-26 | End: 2024-04-26

## 2024-04-26 RX ORDER — FOLIC ACID 1 MG/1
1 TABLET ORAL DAILY
Status: DISCONTINUED | OUTPATIENT
Start: 2024-04-27 | End: 2024-04-28 | Stop reason: HOSPADM

## 2024-04-26 RX ORDER — CLONIDINE HYDROCHLORIDE 0.1 MG/1
0.1 TABLET ORAL 3 TIMES DAILY
Status: DISCONTINUED | OUTPATIENT
Start: 2024-04-26 | End: 2024-04-28 | Stop reason: HOSPADM

## 2024-04-26 RX ORDER — VENLAFAXINE HYDROCHLORIDE 150 MG/1
150 CAPSULE, EXTENDED RELEASE ORAL DAILY
Status: DISCONTINUED | OUTPATIENT
Start: 2024-04-26 | End: 2024-04-28 | Stop reason: HOSPADM

## 2024-04-26 RX ORDER — THIAMINE HCL 100 MG
100 TABLET ORAL 3 TIMES DAILY
Status: DISCONTINUED | OUTPATIENT
Start: 2024-04-29 | End: 2024-04-28 | Stop reason: HOSPADM

## 2024-04-26 RX ORDER — CHLORDIAZEPOXIDE HYDROCHLORIDE 25 MG/1
25 CAPSULE, GELATIN COATED ORAL 4 TIMES DAILY PRN
Status: DISCONTINUED | OUTPATIENT
Start: 2024-04-26 | End: 2024-04-28 | Stop reason: HOSPADM

## 2024-04-26 RX ORDER — IBUPROFEN 200 MG
16 TABLET ORAL
Status: DISCONTINUED | OUTPATIENT
Start: 2024-04-26 | End: 2024-04-28 | Stop reason: HOSPADM

## 2024-04-26 RX ORDER — THIAMINE HCL 100 MG
100 TABLET ORAL 3 TIMES DAILY
Status: DISCONTINUED | OUTPATIENT
Start: 2024-04-29 | End: 2024-04-26

## 2024-04-26 RX ORDER — IBUPROFEN 200 MG
24 TABLET ORAL
Status: DISCONTINUED | OUTPATIENT
Start: 2024-04-26 | End: 2024-04-28 | Stop reason: HOSPADM

## 2024-04-26 RX ORDER — OXYCODONE HYDROCHLORIDE 5 MG/1
5 TABLET ORAL EVERY 6 HOURS PRN
Status: DISCONTINUED | OUTPATIENT
Start: 2024-04-26 | End: 2024-04-28 | Stop reason: HOSPADM

## 2024-04-26 RX ORDER — ENOXAPARIN SODIUM 100 MG/ML
40 INJECTION SUBCUTANEOUS EVERY 24 HOURS
Status: DISCONTINUED | OUTPATIENT
Start: 2024-04-26 | End: 2024-04-28 | Stop reason: HOSPADM

## 2024-04-26 RX ORDER — LISINOPRIL AND HYDROCHLOROTHIAZIDE 12.5; 2 MG/1; MG/1
1 TABLET ORAL EVERY MORNING
COMMUNITY
Start: 2024-04-02 | End: 2024-04-29 | Stop reason: SDUPTHER

## 2024-04-26 RX ORDER — TALC
6 POWDER (GRAM) TOPICAL NIGHTLY PRN
Status: DISCONTINUED | OUTPATIENT
Start: 2024-04-26 | End: 2024-04-28 | Stop reason: HOSPADM

## 2024-04-26 RX ORDER — NALOXONE HCL 0.4 MG/ML
0.02 VIAL (ML) INJECTION
Status: DISCONTINUED | OUTPATIENT
Start: 2024-04-26 | End: 2024-04-28 | Stop reason: HOSPADM

## 2024-04-26 RX ORDER — AMOXICILLIN 250 MG
1 CAPSULE ORAL 2 TIMES DAILY
Status: DISCONTINUED | OUTPATIENT
Start: 2024-04-26 | End: 2024-04-28 | Stop reason: HOSPADM

## 2024-04-26 RX ORDER — ACETAMINOPHEN 325 MG/1
650 TABLET ORAL EVERY 4 HOURS PRN
Status: DISCONTINUED | OUTPATIENT
Start: 2024-04-26 | End: 2024-04-28 | Stop reason: HOSPADM

## 2024-04-26 RX ORDER — LORAZEPAM 2 MG/ML
2 INJECTION INTRAMUSCULAR
Status: COMPLETED | OUTPATIENT
Start: 2024-04-26 | End: 2024-04-26

## 2024-04-26 RX ADMIN — TRAZODONE HYDROCHLORIDE 50 MG: 50 TABLET ORAL at 09:04

## 2024-04-26 RX ADMIN — LORAZEPAM 2 MG: 2 INJECTION INTRAMUSCULAR; INTRAVENOUS at 03:04

## 2024-04-26 RX ADMIN — CLONIDINE HYDROCHLORIDE 0.1 MG: 0.1 TABLET ORAL at 09:04

## 2024-04-26 RX ADMIN — DOCUSATE SODIUM AND SENNOSIDES 1 TABLET: 8.6; 5 TABLET, FILM COATED ORAL at 09:04

## 2024-04-26 RX ADMIN — ENOXAPARIN SODIUM 40 MG: 40 INJECTION SUBCUTANEOUS at 04:04

## 2024-04-26 RX ADMIN — ASCORBIC ACID, VITAMIN A PALMITATE, CHOLECALCIFEROL, THIAMINE HYDROCHLORIDE, RIBOFLAVIN-5 PHOSPHATE SODIUM, PYRIDOXINE HYDROCHLORIDE, NIACINAMIDE, DEXPANTHENOL, ALPHA-TOCOPHEROL ACETATE, VITAMIN K1, FOLIC ACID, BIOTIN, CYANOCOBALAMIN: 200; 3300; 200; 6; 3.6; 6; 40; 15; 10; 150; 600; 60; 5 INJECTION, SOLUTION INTRAVENOUS at 05:04

## 2024-04-26 RX ADMIN — KETOROLAC TROMETHAMINE 30 MG: 30 INJECTION, SOLUTION INTRAMUSCULAR at 12:04

## 2024-04-26 RX ADMIN — LIDOCAINE HYDROCHLORIDE 100 MG: 10 INJECTION, SOLUTION EPIDURAL; INFILTRATION; INTRACAUDAL at 01:04

## 2024-04-26 RX ADMIN — DIAZEPAM 10 MG: 5 TABLET ORAL at 09:04

## 2024-04-26 RX ADMIN — BUSPIRONE HYDROCHLORIDE 10 MG: 10 TABLET ORAL at 09:04

## 2024-04-26 RX ADMIN — THIAMINE HYDROCHLORIDE 100 MG: 100 INJECTION, SOLUTION INTRAMUSCULAR; INTRAVENOUS at 12:04

## 2024-04-26 RX ADMIN — SODIUM CHLORIDE 1000 ML: 9 INJECTION, SOLUTION INTRAVENOUS at 12:04

## 2024-04-26 RX ADMIN — VENLAFAXINE HYDROCHLORIDE 150 MG: 150 CAPSULE, EXTENDED RELEASE ORAL at 04:04

## 2024-04-26 RX ADMIN — POLYETHYLENE GLYCOL 3350 17 G: 17 POWDER, FOR SOLUTION ORAL at 04:04

## 2024-04-26 RX ADMIN — BUPIVACAINE HYDROCHLORIDE 50 MG: 5 INJECTION, SOLUTION EPIDURAL; INTRACAUDAL; PERINEURAL at 01:04

## 2024-04-26 NOTE — CONSULTS
[facility]  Orthopedics  Consult Note    Patient Name: Esther Romo  MRN: [id number]  Admission Date: 4/26/2024  Hospital Length of Stay: 0 days  Attending Provider: Em Brown MD  Primary Care Provider: Lanie Primary Doctor    Patient information was obtained from patient, past medical records, and ER records.     Inpatient consult to Orthopedic Surgery  Consult performed by: Donell Cruz PA-C  Consult ordered by: Em Brown MD        Subjective:     Principal Problem:  Right ankle fracture/dislocation    Chief Complaint:   Chief Complaint   Patient presents with    Right Ankle Pain     Per EMS patient was a a motel drinking, when she fell, injuring her right ankle. Pain rating 6/10. Per EMS deformity present. Pt has been drinking alcohol since last night.         HPI: Esther Romo is a 34 y.o. female with past medical history significant for alcohol abuse, depression, tachycardia, and elevated liver enzymes who is in the emergency department for right ankle fracture/dislocation.  This occurred earlier today.  Fracture was reduced and splinted in the emergency department.  She is resting comfortably in bed.  She denies numbness or tingling in the extremity.    Past Medical History:   Diagnosis Date    Anxiety     Hypertension     Miscarriage        Past Surgical History:   Procedure Laterality Date    BREAST LUMPECTOMY         Review of patient's allergies indicates:  No Known Allergies    Current Facility-Administered Medications   Medication Dose Route Frequency Provider Last Rate Last Admin    acetaminophen tablet 650 mg  650 mg Oral Q8H PRN Em Brown MD        acetaminophen tablet 650 mg  650 mg Oral Q4H PRN Em Brown MD        busPIRone tablet 10 mg  10 mg Oral BID Em Brown MD        chlordiazepoxide capsule 25 mg  25 mg Oral QID PRN Em Brown MD        cloNIDine tablet 0.1 mg  0.1 mg Oral TID Em Brown MD         dextrose 10% bolus 125 mL 125 mL  12.5 g Intravenous PRN Em Brown MD        dextrose 10% bolus 250 mL 250 mL  25 g Intravenous PRN Em Brown MD        diazePAM tablet 10 mg  10 mg Oral Q8H Em Brown MD        enoxaparin injection 40 mg  40 mg Subcutaneous Daily Em Brown MD   40 mg at 04/26/24 1626    [START ON 4/27/2024] folic acid tablet 1 mg  1 mg Oral Daily Em Brown MD        glucagon (human recombinant) injection 1 mg  1 mg Intramuscular PRN Em Brown MD        glucose chewable tablet 16 g  16 g Oral PRN Em Brown MD        glucose chewable tablet 24 g  24 g Oral PRN Em Brown MD        melatonin tablet 6 mg  6 mg Oral Nightly PRN Em Brown MD        morphine injection 4 mg  4 mg Intravenous Q6H PRN Em Brown MD        naloxone 0.4 mg/mL injection 0.02 mg  0.02 mg Intravenous PRN Em Brown MD        ondansetron injection 4 mg  4 mg Intravenous Q8H PRN Em Brown MD        oxyCODONE immediate release tablet 10 mg  10 mg Oral Q6H PRN Em Brown MD        oxyCODONE immediate release tablet 5 mg  5 mg Oral Q6H PRN Em Brown MD        polyethylene glycol packet 17 g  17 g Oral Daily Em Brown MD   17 g at 04/26/24 1625    senna-docusate 8.6-50 mg per tablet 1 tablet  1 tablet Oral BID Em Brown MD        simethicone chewable tablet 80 mg  1 tablet Oral QID PRN Em Brown MD        sodium chloride 0.9% 1,000 mL with mvi, (ADULT) no.4 with vit K 3,300 unit- 150 mcg/10 mL 10 mL infusion   Intravenous Daily Em Brown  mL/hr at 04/26/24 1705 New Bag at 04/26/24 1705    sodium chloride 0.9% flush 3 mL  3 mL Intravenous Q12H PRN Em Brown MD        [START ON 4/27/2024] thiamine (B-1) 100 mg in dextrose 5 % (D5W) 100 mL IVPB  100 mg Intravenous Daily Em Brown MD        Followed by  "   [START ON 4/29/2024] thiamine tablet 100 mg  100 mg Oral TID Em Brown MD        traZODone tablet 50 mg  50 mg Oral QHS Em Brown MD        venlafaxine 24 hr capsule 150 mg  150 mg Oral Daily Em Brown MD   150 mg at 04/26/24 162     Current Outpatient Medications   Medication Sig Dispense Refill    busPIRone (BUSPAR) 10 MG tablet Take 10 mg by mouth 2 (two) times daily.      lisinopriL-hydrochlorothiazide (PRINZIDE,ZESTORETIC) 20-12.5 mg per tablet Take 1 tablet by mouth every morning.      traZODone (DESYREL) 100 MG tablet Take 100 mg by mouth every evening.      venlafaxine (EFFEXOR-XR) 150 MG Cp24 Take 150 mg by mouth once daily.      multivit-minerals/folic acid (WOMEN'S MULTIVITAMIN GUMMIES ORAL) Take by mouth once daily.       Family History       Problem Relation (Age of Onset)    No Known Problems Mother, Father          Tobacco Use    Smoking status: Every Day     Current packs/day: 0.50     Types: Cigarettes    Smokeless tobacco: Never   Substance and Sexual Activity    Alcohol use: Yes     Comment: "I'm an alcoholic"    Drug use: Yes     Types: Cocaine, Marijuana     Comment: ocassionally    Sexual activity: Not on file     Review of Systems   Constitutional: Negative for chills, decreased appetite, fever and weight loss.   HENT:  Negative for congestion, hoarse voice and sore throat.    Eyes:  Negative for blurred vision, double vision, vision loss in left eye and vision loss in right eye.   Cardiovascular:  Negative for chest pain, palpitations and syncope.   Respiratory:  Negative for cough, shortness of breath and wheezing.    Endocrine: Negative for cold intolerance and heat intolerance.   Hematologic/Lymphatic: Negative for bleeding problem. Does not bruise/bleed easily.   Skin:  Negative for dry skin, flushing, itching and suspicious lesions.   Musculoskeletal:  Positive for falls, joint pain and joint swelling.   Gastrointestinal:  Negative for abdominal " "pain, diarrhea, nausea and vomiting.   Genitourinary:  Negative for dysuria, frequency and urgency.   Neurological:  Negative for dizziness, headaches, numbness and paresthesias.   Psychiatric/Behavioral:  Negative for altered mental status and memory loss.      Objective:     Vital Signs (Most Recent):  Temp: 98.4 °F (36.9 °C) (04/26/24 1245)  Pulse: (!) 118 (04/26/24 1700)  Resp: 18 (04/26/24 1700)  BP: 132/77 (04/26/24 1700)  SpO2: (!) 94 % (04/26/24 1700) Vital Signs (24h Range):  Temp:  [98 °F (36.7 °C)-98.4 °F (36.9 °C)] 98.4 °F (36.9 °C)  Pulse:  [] 118  Resp:  [18-20] 18  SpO2:  [94 %-99 %] 94 %  BP: (119-138)/(75-97) 132/77     Weight: 81.6 kg (180 lb)  Height: 5' 2" (157.5 cm)  Body mass index is 32.92 kg/m².      Intake/Output Summary (Last 24 hours) at 4/26/2024 1733  Last data filed at 4/26/2024 1501  Gross per 24 hour   Intake 1199 ml   Output --   Net 1199 ml       Ortho/SPM Exam  Right ankle:   Splint is in place and well fitting   Toes are exposed and well perfused   Motor exam normal   Sensation intact  Cap refill brisk    GEN: Well developed, well nourished female. AAOX3. No acute distress.   Head: Normocephalic, atraumatic.   Eyes: CLEMENTE  Neck: Trachea is midline, no adenopathy  Resp: Breathing unlabored.  Neuro: Motor function normal, Cranial nerves intact  Psych: Mood and affect appropriate.      Significant Labs:   Recent Lab Results         04/26/24  1213        Albumin 4.0       Alcohol, Serum 376  Comment: ALC critical result(s) called and verbal readback obtained from   Ivone Maria RN by MP 04/26/2024 13:39         ALP 82       ALT 25       Anion Gap 18       PTT 28.5  Comment: Refer to local heparin nomogram for intensity/dose specific   therapeutic   range.         AST 24       Baso # 0.06       Basophil % 0.7       BILIRUBIN TOTAL 0.4  Comment: For infants and newborns, interpretation of results should be based  on gestational age, weight and in agreement with " clinical  observations.    Premature Infant recommended reference ranges:  Up to 24 hours.............<8.0 mg/dL  Up to 48 hours............<12.0 mg/dL  3-5 days..................<15.0 mg/dL  6-29 days.................<15.0 mg/dL         BUN 11       Calcium 9.1       Chloride 99       CO2 21       Creatinine 0.8       Differential Method Automated       eGFR >60       Eos # 0.0       Eos % 0.0       Glucose 109       Gran # (ANC) 7.3       Gran % 79.6       Hematocrit 43.0       Hemoglobin 14.7       Hepatitis C Ab Negative       HEP C Virus Hold Specimen Hold for HCV sendout       HIV 1/2 Ag/Ab Negative       Immature Grans (Abs) 0.03  Comment: Mild elevation in immature granulocytes is non specific and   can be seen in a variety of conditions including stress response,   acute inflammation, trauma and pregnancy. Correlation with other   laboratory and clinical findings is essential.         Immature Granulocytes 0.3       INR 1.0  Comment: Coumadin Therapy:  2.0 - 3.0 for INR for all indicators except mechanical heart valves  and antiphospholipid syndromes which should use 2.5 - 3.5.         Lymph # 1.5       Lymph % 16.9       Magnesium  2.2       MCH 28.2       MCHC 34.2       MCV 83       Mono # 0.2       Mono % 2.5       MPV 9.5       nRBC 0       Platelet Count 377       Potassium 4.5       PROTEIN TOTAL 8.1       PT 10.5       RBC 5.21       RDW 14.7       Sodium 138       WBC 9.12             All pertinent labs within the past 24 hours have been reviewed.    Significant Imaging: X-Ray: I have reviewed all pertinent results/findings and my personal findings are:  X-ray right ankle obtained in the emergency department shows interval reduction of tibiotalar joint.  There is fracture of the distal fibula and medial malleolus.    Assessment/Plan:     There are no hospital problems to display for this patient.      Assessment:   34 y.o. female with past medical history significant for alcohol abuse, depression,  tachycardia, and elevated liver enzymes who is in the emergency department for right ankle fracture/dislocation    Plan:   Reviewed the radiographs with the patient.  Recommended operative intervention.  We discussed operative versus non operative management.  We discussed the risks and benefits associated with surgery as well as alternative treatment options.  All questions were asked and answered.  Patient voiced understanding and willingness to proceed.  We will make the patient NPO at midnight tonight.  We will also obtain a CT of the right ankle to further aid in preoperative planning.    Donell Cruz PA-C  Orthopedics  [facility]

## 2024-04-26 NOTE — ED PROVIDER NOTES
Emergency Medicine Provider Note - 4/26/2024       SCRIBE NOTE: IRaegan, am scribing for, and in the presence of, Opal Chavarria.     History     Chief Complaint   Patient presents with    Right Ankle Pain     Per EMS patient was a a motel drinking, when she fell, injuring her right ankle. Pain rating 6/10. Per EMS deformity present. Pt has been drinking alcohol since last night.        Allergies:  Review of patient's allergies indicates:  No Known Allergies     History of Present Illness   HPI    4/26/2024, 11:35 PM  The history is provided by the  JOSIE Saeed  and patient    Esther Romo is a 34 y.o. female presenting to the ED for right ankle pain.    According to [facility], patient tripped on her shoe.  Positive EtOH.  Positive deformity of the foot.  Placed in splint.    According to friendChristophe:  He was present when she was walking.  She tripped over a shoe.  She landed directly on her right ankle and buttocks.  There was no loss of consciousness, no seizure activity.  He states that she did not hit her head.    Patient is complaining of right ankle pain.  Onset prior to arrival.  Patient last ate [employer] at 1:30 a.m. this morning.  Patient had a sip of alcohol prior to the emergency department.  Patient has prior history of alcoholism.  Patient started drinking back about 3 days ago.  Patient denies any headache, loss of consciousness, tongue pain, neck pain, back pain, abdominal pain, chest pain, chest pressure, shortness of breath.  She is complaining of right ankle pain.      Arrival mode: [facility]     PCP: No, Primary Doctor     Past Medical History:  Past Medical History:   Diagnosis Date    Alcohol use, unspecified with unspecified alcohol-induced disorder     Anxiety     Hypertension     Miscarriage        Past Surgical History:  Past Surgical History:   Procedure Laterality Date    BREAST LUMPECTOMY           Family History:  Family History  "  Problem Relation Name Age of Onset    No Known Problems Mother      No Known Problems Father         Social History:  Social History     Tobacco Use    Smoking status: Every Day     Current packs/day: 0.50     Types: Cigarettes    Smokeless tobacco: Never   Substance and Sexual Activity    Alcohol use: Yes     Comment: "I'm an alcoholic"    Drug use: Yes     Types: Cocaine, Marijuana     Comment: ocassionally    Sexual activity: Not on file        Review of Systems   Review of Systems   Constitutional:  Negative for fever.   Respiratory:  Negative for shortness of breath.    Cardiovascular:  Negative for chest pain.   Gastrointestinal:  Negative for nausea and vomiting.   Genitourinary:  Negative for dysuria.   Musculoskeletal:  Positive for arthralgias (Right ankle pain). Negative for back pain, neck pain and neck stiffness.   Skin:  Negative for rash.   Neurological:  Negative for seizures, weakness and headaches.   Hematological:  Does not bruise/bleed easily.      Physical Exam     Initial Vitals [04/26/24 1037]   BP Pulse Resp Temp SpO2   (!) 138/97 95 18 98 °F (36.7 °C) (!) 94 %      MAP       --          Physical Exam  Nursing Notes and Vital Signs Reviewed.  Constitutional: Patient is in no apparent distress. Well-developed and well-nourished.  Head: Atraumatic. Normocephalic.  Eyes: PERRL. EOM intact. Conjunctivae are not pale. No scleral icterus.  No periorbital contusion.  ENT: Mucous membranes are moist. Oropharynx is clear and symmetric.  No hemotympanum.  No septal hematoma.No malocclusion.  No posterior auricular hematoma.  Neck: Supple. Full ROM. No lymphadenopathy.  No midline C-spine tenderness.  Patient able to rotate neck 45° without pain.  Cardiovascular: Regular rate. Regular rhythm. No murmurs, rubs, or gallops. Distal pulses are 2+ and symmetric.  Pulmonary/Chest: No respiratory distress. Clear to auscultation bilaterally. No wheezing or rales.  No bruising or seatbelt sign.  No " crepitance.  Abdominal: Soft and non-distended.  There is no tenderness.  No rebound, guarding, or rigidity. Good bowel sounds.  No bruising or seatbelt sign.  Genitourinary: No CVA tenderness  Musculoskeletal: Moves all extremities. No obvious deformities. No edema. No calf tenderness.  T-spine:  No midline T-spine tenderness, bony step-off, or bruising noted.  L-spine:  No midline L-spine tenderness, bony step-off, or bruising noted.  Intact median, ulnar, and radial nerves both motor and sensory.  Patient able lift arms above head.  Right ankle:  There is obvious deformity to the right ankle.  Skin closed.  Proprioception intact.  Positive pulses.  Pink.  Skin intact.  No pain to the proximal fibula.  No pain to the right knee.  Calcaneus nontender to palpation.  Fifth metatarsal nontender to palpation.  Skin: Warm and dry.  Neurological:  Alert, awake, and appropriate.  Normal speech.  No acute focal neurological deficits are appreciated.  Cranial nerves 2-12 intact.  GCS 15.   Psychiatric: Normal affect. Good eye contact. Appropriate in content.       ED Course   ED Procedures:  Orthopedic Injury    Date/Time: 4/26/2024 1:21 PM    Performed by: Opal Chavarria DO  Authorized by: Opal Chavarria DO    Location procedure was performed:  [facility] EMERGENCY DEPARTMENT  Pre-operative diagnosis:  Right ankle fracture  Injury:     Injury location:  Ankle    Location details:  Right ankle    Injury type:  Fracture-dislocation    Fracture type: bimalleolar        Pre-procedure assessment:     Neurovascular status: Neurovascularly intact      Distal perfusion: normal      Neurological function: normal      Range of motion: reduced      Local anesthesia used?: Yes      Anesthesia:  Hematoma block    Local anesthetic:  Lidocaine 1% without epinephrine and bupivacaine 0.5% without epinephrine    Anesthetic total (ml):  10 (1:1 MIX)    Patient sedated?: No        Selections made in this section will also lock the Injury  "type section above.:     Manipulation performed?: Yes      Skin traction used?: Yes      Skeletal traction used?: Yes      Reduction successful?: Yes      Confirmation: Reduction confirmed by x-ray      Immobilization:  Splint    Splint type: Short leg with sugar tong.    Supplies used:  Elastic bandage, Ortho-Glass and cotton padding    Complications: No    Post-procedure assessment:     Neurovascular status: Neurovascularly intact      Distal perfusion: normal      Neurological function: normal      Range of motion: improved      Patient tolerance:  Patient tolerated the procedure well with no immediate complications    ED Vital Signs:  Vitals:    04/26/24 1037 04/26/24 1135 04/26/24 1200 04/26/24 1230   BP: (!) 138/97 119/75 123/84 128/76   Pulse: 95 97 99 95   Resp: 18 20 20 18   Temp: 98 °F (36.7 °C)      TempSrc: Oral      SpO2: (!) 94% 95% 97% 98%   Weight: 81.6 kg (180 lb)      Height: 5' 2" (1.575 m)       04/26/24 1245 04/26/24 1300 04/26/24 1400 04/26/24 1530   BP: 138/82 138/82 129/79 135/78   Pulse: 92 97 106 110   Resp: 18 18 18 18   Temp: 98.4 °F (36.9 °C)      TempSrc: Oral      SpO2: 97% 99% 96% (!) 94%   Weight:       Height:        04/26/24 1600 04/26/24 1630 04/26/24 1700 04/26/24 1758   BP: 134/79 (!) 131/93 132/77 123/73   Pulse: (!) 119 (!) 119 (!) 118 (!) 123   Resp: 18 19 18 20   Temp:    98.2 °F (36.8 °C)   TempSrc:    Oral   SpO2: (!) 94% (!) 94% (!) 94% 95%   Weight:       Height:        04/26/24 1921   BP: 139/83   Pulse: (!) 118   Resp: 18   Temp: 98.9 °F (37.2 °C)   TempSrc: Oral   SpO2: 98%   Weight:    Height:        Abnormal Lab Results:  Labs Reviewed   CBC W/ AUTO DIFFERENTIAL - Abnormal; Notable for the following components:       Result Value    RDW 14.7 (*)     Mono # 0.2 (*)     Gran % 79.6 (*)     Lymph % 16.9 (*)     Mono % 2.5 (*)     All other components within normal limits   COMPREHENSIVE METABOLIC PANEL - Abnormal; Notable for the following components:    CO2 21 (*)     " Anion Gap 18 (*)     All other components within normal limits   ALCOHOL,MEDICAL (ETHANOL) - Abnormal; Notable for the following components:    Alcohol, Serum 376 (*)     All other components within normal limits    Narrative:     ALC critical result(s) called and verbal readback obtained from   Ivone Maria RN by JOSE ARMANDO 04/26/2024 13:39   HIV 1 / 2 ANTIBODY    Narrative:     Release to patient->Immediate   HEPATITIS C ANTIBODY    Narrative:     Release to patient->Immediate   HEP C VIRUS HOLD SPECIMEN    Narrative:     Release to patient->Immediate   PROTIME-INR   APTT   MAGNESIUM        All Lab Results:  Results for orders placed or performed during the hospital encounter of 04/26/24   HIV 1/2 Ag/Ab (4th Gen)   Result Value Ref Range    HIV 1/2 Ag/Ab Negative Negative   Hepatitis C Antibody   Result Value Ref Range    Hepatitis C Ab Negative Negative   HCV Virus Hold Specimen   Result Value Ref Range    HEP C Virus Hold Specimen Hold for HCV sendout    CBC Auto Differential   Result Value Ref Range    WBC 9.12 3.90 - 12.70 K/uL    RBC 5.21 4.00 - 5.40 M/uL    Hemoglobin 14.7 12.0 - 16.0 g/dL    Hematocrit 43.0 37.0 - 48.5 %    MCV 83 82 - 98 fL    MCH 28.2 27.0 - 31.0 pg    MCHC 34.2 32.0 - 36.0 g/dL    RDW 14.7 (H) 11.5 - 14.5 %    Platelets 377 150 - 450 K/uL    MPV 9.5 9.2 - 12.9 fL    Immature Granulocytes 0.3 0.0 - 0.5 %    Gran # (ANC) 7.3 1.8 - 7.7 K/uL    Immature Grans (Abs) 0.03 0.00 - 0.04 K/uL    Lymph # 1.5 1.0 - 4.8 K/uL    Mono # 0.2 (L) 0.3 - 1.0 K/uL    Eos # 0.0 0.0 - 0.5 K/uL    Baso # 0.06 0.00 - 0.20 K/uL    nRBC 0 0 /100 WBC    Gran % 79.6 (H) 38.0 - 73.0 %    Lymph % 16.9 (L) 18.0 - 48.0 %    Mono % 2.5 (L) 4.0 - 15.0 %    Eosinophil % 0.0 0.0 - 8.0 %    Basophil % 0.7 0.0 - 1.9 %    Differential Method Automated    Comprehensive Metabolic Panel   Result Value Ref Range    Sodium 138 136 - 145 mmol/L    Potassium 4.5 3.5 - 5.1 mmol/L    Chloride 99 95 - 110 mmol/L    CO2 21 (L) 23 - 29  mmol/L    Glucose 109 70 - 110 mg/dL    BUN 11 6 - 20 mg/dL    Creatinine 0.8 0.5 - 1.4 mg/dL    Calcium 9.1 8.7 - 10.5 mg/dL    Total Protein 8.1 6.0 - 8.4 g/dL    Albumin 4.0 3.5 - 5.2 g/dL    Total Bilirubin 0.4 0.1 - 1.0 mg/dL    Alkaline Phosphatase 82 55 - 135 U/L    AST 24 10 - 40 U/L    ALT 25 10 - 44 U/L    eGFR >60 >60 mL/min/1.73 m^2    Anion Gap 18 (H) 8 - 16 mmol/L   Ethanol   Result Value Ref Range    Alcohol, Serum 376 (HH) <10 mg/dL   Protime-INR   Result Value Ref Range    Prothrombin Time 10.5 9.0 - 12.5 sec    INR 1.0 0.8 - 1.2   APTT   Result Value Ref Range    aPTT 28.5 21.0 - 32.0 sec   Magnesium   Result Value Ref Range    Magnesium 2.2 1.6 - 2.6 mg/dL   Pregnancy, urine rapid   Result Value Ref Range    Preg Test, Ur Negative    Drug screen panel, in-house   Result Value Ref Range    Benzodiazepines Negative Negative    Methadone metabolites Negative Negative    Cocaine (Metab.) Negative Negative    Opiate Scrn, Ur Negative Negative    Barbiturate Screen, Ur Negative Negative    Amphetamine Screen, Ur Negative Negative    THC Negative Negative    Phencyclidine Negative Negative    Creatinine, Urine 186.7 15.0 - 325.0 mg/dL    Toxicology Information SEE COMMENT    Urinalysis, Reflex to Urine Culture Urine, Clean Catch    Specimen: Urine   Result Value Ref Range    Specimen UA Urine, Clean Catch     Color, UA Yellow Yellow, Straw, Rose Mary    Appearance, UA Clear Clear    pH, UA 6.0 5.0 - 8.0    Specific Gravity, UA 1.030 1.005 - 1.030    Protein, UA 1+ (A) Negative    Glucose, UA Negative Negative    Ketones, UA 1+ (A) Negative    Bilirubin (UA) Negative Negative    Occult Blood UA Negative Negative    Nitrite, UA Negative Negative    Urobilinogen, UA Negative <2.0 EU/dL    Leukocytes, UA Negative Negative   Urinalysis Microscopic   Result Value Ref Range    RBC, UA 0 0 - 4 /hpf    WBC, UA 2 0 - 5 /hpf    Bacteria Rare None-Occ /hpf    Squam Epithel, UA 2 /hpf    Hyaline Casts, UA 0 0-1/lpf /lpf     Microscopic Comment SEE COMMENT        Imaging Results:  Imaging Results              CT Ankle (Including Hindfoot) Without Contrast Right (Final result)  Result time 04/26/24 19:03:36      Final result by Jani Esquivel MD (04/26/24 19:03:36)                   Impression:      As above    All CT scans at this facility are performed  using dose modulation techniques as appropriate to performed exam including the following:  automated exposure control; adjustment of mA and/or kV according to the patients size (this includes techniques or standardized protocols for targeted exams where dose is matched to indication/reason for exam: i.e. extremities or head);  iterative reconstruction technique.      Electronically signed by: Jani Esquivel  Date:    04/26/2024  Time:    19:03               Narrative:    EXAMINATION:  CT ANKLE (INCLUDING HINDFOOT) WITHOUT CONTRAST RIGHT    CLINICAL HISTORY:  Ankle trauma, fracture, xray done (Age >= 5y);    TECHNIQUE:  CT of the right ankle without contrast    COMPARISON:  None    FINDINGS:  Patient is status post splinting.  Fracture of the distal 3rd of the fibula is reidentified.  Fracture of the medial malleolus appears comminuted.  Talar dome is intact.  Fracture of the lateral malleolus is also identified.                                       X-Ray Ankle Complete Right (Final result)  Result time 04/26/24 14:01:01      Final result by Jesu Murphy MD (04/26/24 14:01:01)                   Impression:      As above      Electronically signed by: Jesu Murphy MD  Date:    04/26/2024  Time:    14:01               Narrative:    EXAMINATION:  XR ANKLE COMPLETE 3 VIEW RIGHT    CLINICAL HISTORY:  XR ANKLE COMPLETE 3 VIEW RIGHTPain in right ankle and joints of right foot    COMPARISON:  None    FINDINGS:  Three views of the right ankle were obtained.    Status post reduction and splinting.  Significantly improved alignment with fractures of the distal 3rd of the fibula as well  as tibial metaphysis again noted.  Fracture involving the medial malleolus also noted.  Ankle mortise appears improved as well as lateral view with better alignment of the tibiotalar joint.                                       CT Cervical Spine Without Contrast (Final result)  Result time 04/26/24 13:32:24      Final result by Cali Leach MD (04/26/24 13:32:24)                   Impression:      No fracture or traumatic malalignment of the cervical spine.    All CT scans at this facility are performed  using dose modulation techniques as appropriate to performed exam including the following:  automated exposure control; adjustment of mA and/or kV according to the patients size (this includes techniques or standardized protocols for targeted exams where dose is matched to indication/reason for exam: i.e. extremities or head);  iterative reconstruction technique.      Electronically signed by: Cali Leach  Date:    04/26/2024  Time:    13:32               Narrative:    EXAMINATION:  CT CERVICAL SPINE WITHOUT CONTRAST    CLINICAL HISTORY:  Neck trauma, intoxicated or obtunded (Age >= 16y);    TECHNIQUE:  Low dose axial CT images through the cervical spine, with sagittal and coronal reformations.  Contrast was not administered.    COMPARISON:  04/08/2019    FINDINGS:  The vertebral bodies are normal in height and morphology without evidence of fracture or osseous destructive process.  Normal sagittal alignment is preserved.    Mild degenerative changes without evidence of bony spinal canal stenosis or high grade neuroforaminal narrowing.  Intervertebral disk heights are well maintained.    Limited evaluation of the intraspinal contents demonstrates no hematoma or mass.Paraspinal soft tissues exhibit no acute abnormalities.                                       X-Ray Ankle Complete Right (Final result)  Result time 04/26/24 12:28:13      Final result by Jesu Murphy MD (04/26/24 12:28:13)                    Impression:      As above      Electronically signed by: Jesu Murphy MD  Date:    04/26/2024  Time:    12:28               Narrative:    EXAMINATION:  XR ANKLE COMPLETE 3 VIEW RIGHT    CLINICAL HISTORY:  XR ANKLE COMPLETE 3 VIEW RIGHTPain in right ankle and joints of right foot    COMPARISON:  None    FINDINGS:  Two views of the right ankle were obtained.    Fractures involving the length distal 3rd of the fibula as well as distal tibial metaphysis.  Slight disruption ankle mortise on AP view and a small to moderate displacement of the tibia at the tibiotalar joint.  Approximately 28 degrees of dorsal angulation.  Bony mineralization is normal.  Soft tissue swelling and joint effusion.                                       Interpretation: Fracture of the distal fibula with angulation, fracture tibia.          The Emergency Provider reviewed the vital signs and test results, which are outlined above.     ED Discussion   ED Medication(s):  Medications   sodium chloride 0.9% flush 3 mL (has no administration in time range)   melatonin tablet 6 mg (has no administration in time range)   ondansetron injection 4 mg (has no administration in time range)   polyethylene glycol packet 17 g (17 g Oral Given 4/26/24 1625)   senna-docusate 8.6-50 mg per tablet 1 tablet (1 tablet Oral Given 4/26/24 2109)   acetaminophen tablet 650 mg (has no administration in time range)   acetaminophen tablet 650 mg (has no administration in time range)   naloxone 0.4 mg/mL injection 0.02 mg (has no administration in time range)   glucose chewable tablet 16 g (has no administration in time range)   glucose chewable tablet 24 g (has no administration in time range)   glucagon (human recombinant) injection 1 mg (has no administration in time range)   enoxaparin injection 40 mg (40 mg Subcutaneous Given 4/26/24 1626)   oxyCODONE immediate release tablet 5 mg (has no administration in time range)   oxyCODONE immediate release tablet 10 mg (has no  administration in time range)   simethicone chewable tablet 80 mg (has no administration in time range)   dextrose 10% bolus 125 mL 125 mL (has no administration in time range)   dextrose 10% bolus 250 mL 250 mL (has no administration in time range)   diazePAM tablet 10 mg (10 mg Oral Given 4/26/24 2109)   chlordiazepoxide capsule 25 mg (has no administration in time range)   sodium chloride 0.9% 1,000 mL with mvi, (ADULT) no.4 with vit K 3,300 unit- 150 mcg/10 mL 10 mL infusion ( Intravenous New Bag 4/26/24 1705)   folic acid tablet 1 mg (has no administration in time range)   busPIRone tablet 10 mg (10 mg Oral Given 4/26/24 2109)   venlafaxine 24 hr capsule 150 mg (150 mg Oral Given 4/26/24 1625)   morphine injection 4 mg (has no administration in time range)   cloNIDine tablet 0.1 mg (0.1 mg Oral Given 4/26/24 2109)   traZODone tablet 50 mg (50 mg Oral Given 4/26/24 2109)   thiamine (B-1) 100 mg in dextrose 5 % (D5W) 100 mL IVPB (has no administration in time range)     Followed by   thiamine tablet 100 mg (has no administration in time range)   ketorolac injection 30 mg (30 mg Intravenous Given 4/26/24 1213)   thiamine (B-1) 100 mg in dextrose 5 % (D5W) 100 mL IVPB (0 mg Intravenous Stopped 4/26/24 1443)   sodium chloride 0.9% bolus 1,000 mL 1,000 mL (0 mLs Intravenous Stopped 4/26/24 1315)   LIDOcaine (PF) 10 mg/ml (1%) injection 100 mg (100 mg Other Given by Other 4/26/24 1323)   BUPivacaine (PF) 0.5% (5 mg/mL) injection 50 mg (50 mg Infiltration Given by Other 4/26/24 1323)   LORazepam injection 2 mg (2 mg Intravenous Given 4/26/24 1509)       ED Course as of 04/26/24 2236 Fri Apr 26, 2024   1238 Independent review of the x-ray:  Positive fracture noted to the fibula and tibia. [LB]   1351 Patient is counseled on the alcohol [LB]   1403 Status post reduction film: Improved alignment of the ankle.  Positive pulses.  Sensation intact [LB]   1415 Dr. Carlson recommends observation, NPO after midnight. [LB]    1430 Discussed with patient recommendation for observation as per orthopedist.  Patient is currently deciding whether or not she would like to be admitted to the hospital.  Risks of leaving include loss of limb, bleeding, fracture blisters, permanent disability, seizure, death. [LB]   1452 Awaiting to see if patient agrees to observation. [LB]   1458 Patient agreed to be observed in the hospital.  [LB]      ED Course User Index  [LB] Opal Chavarria, DO       2:55 PM: Discussed case with Em Espinosa ([facility]). Dr. Em Espinosa agrees with current care and management of pt and accepts admission.   Admitting Service: [facility] medicine  Admitting Physician: Dr. Em Espinosa  Admit to: Inpatient med/surg      2:55 PM: Re-evaluated pt. I have discussed test results, shared treatment plan, and the need for admission with patient and family at bedside. Pt and family express understanding at this time and agree with all information. All questions answered. Pt and family have no further questions or concerns at this time. Pt is ready for admit.         MIPS Measures     Smoker? No     Hypertension: History of Hypertension: The patient has elevated blood pressure (higher than 120/80) while being treated in the ED but has a history of hypertension.     Medical Decision Making                 Medical Decision Making  Differential diagnosis: Fracture, sprain, dislocation, acute alcohol intoxication, alcohol withdrawal,    Patient had fracture dislocation of right ankle.  CT cervical spine performed as patient is acutely intoxicated.  No fracture.  Patient given thiamine 100 mg as well as fluid bolus.  ETOH 376.  CBC normal.  CMP normal.  Mag 2.2.  Patient underwent a hematoma block.  Post reduction of fracture dislocation was performed in the extremity was splinted.  Patient had good capillary refill after postreduction.  Toes pink.  Case was discussed via secure chat with orthopedics,  "recommended observation to the hospital for surgical repair.  Hospital Medicine admit.    Amount and/or Complexity of Data Reviewed  Labs: ordered. Decision-making details documented in ED Course.  Radiology: ordered and independent interpretation performed. Decision-making details documented in ED Course.    Risk  Prescription drug management.  Decision regarding hospitalization.        Coding    Prescription Management: I performed a review of the patient's current Rx medication list as input by nursing staff.    Current Discharge Medication List        CONTINUE these medications which have NOT CHANGED    Details   busPIRone (BUSPAR) 10 MG tablet Take 10 mg by mouth 2 (two) times daily.      lisinopriL-hydrochlorothiazide (PRINZIDE,ZESTORETIC) 20-12.5 mg per tablet Take 1 tablet by mouth every morning.      traZODone (DESYREL) 100 MG tablet Take 100 mg by mouth every evening.      venlafaxine (EFFEXOR-XR) 150 MG Cp24 Take 150 mg by mouth once daily.      multivit-minerals/folic acid (WOMEN'S MULTIVITAMIN GUMMIES ORAL) Take by mouth once daily.              Discussed case with:Hospital Medicine and Orthopedics    Portions of this note may have been created with voice recognition software. Occasional "wrong-word" or "sound-a-like" substitutions may have occurred due to the inherent limitations of voice recognition software. Please, read the note carefully and recognize, using context, where substitutions have occurred.          Clinical Impression       ICD-10-CM ICD-9-CM   1. Closed fracture dislocation of right ankle joint, initial encounter  S82.891A 824.8   2. Ankle pain, right  M25.571 719.47   3. Ankle pain, right  M25.571 719.47   4. Chest pain  R07.9 786.50   5. Preop cardiovascular exam  Z01.810 V72.81   6. Closed fracture dislocation of right ankle, initial encounter  S82.891A 824.8   7. Alcohol abuse  F10.10 305.00         ED Disposition     Disposition: Admit to med/surg floor  Patient condition: Fair    "     Scribe Attestation:   Scribe #1: I performed the above scribed service and the documentation accurately describes the services I performed. I attest to the accuracy of the note.     Attending:   Physician Attestation Statement for Scribe #1: I, Opal Chavarria, , personally performed the services described in this documentation, as scribed by Litzy Azevedo, in my presence, and it is both accurate and complete.                 Opal Chavarria DO  04/26/24 2236

## 2024-04-26 NOTE — PHARMACY MED REC
"Admission Medication History     The home medication history was taken by Antony Campbell.    You may go to "Admission" then "Reconcile Home Medications" tabs to review and/or act upon these items.     The home medication list has been updated by the Pharmacy department.   Please read ALL comments highlighted in yellow.   Please address this information as you see fit.    Feel free to contact us if you have any questions or require assistance.      The medications listed below were removed from the home medication list. Please reorder if appropriate:  Patient reports no longer taking the following medication(s):  XANAX 0.5MG  NORVASC 10MG  BENADRYL 50MG  LEXAPRO 10MG  HCTZ 12.5MG      Medications listed below were obtained from: Patient/family and Analytic software- Vetr    Current Outpatient Medications on File Prior to Encounter   Medication Sig Dispense Refill Last Dose    busPIRone (BUSPAR) 10 MG tablet Take 10 mg by mouth 2 (two) times daily.   4/25/2024    lisinopriL-hydrochlorothiazide (PRINZIDE,ZESTORETIC) 20-12.5 mg per tablet Take 1 tablet by mouth every morning.   4/25/2024    traZODone (DESYREL) 100 MG tablet Take 100 mg by mouth every evening.   4/25/2024    venlafaxine (EFFEXOR-XR) 150 MG Cp24 Take 150 mg by mouth once daily.   4/25/2024     Antony Campbell  [id number]                  .          "

## 2024-04-27 ENCOUNTER — ANESTHESIA (OUTPATIENT)
Dept: SURGERY | Facility: HOSPITAL | Age: 34
End: 2024-04-27
Payer: COMMERCIAL

## 2024-04-27 ENCOUNTER — ANESTHESIA EVENT (OUTPATIENT)
Dept: SURGERY | Facility: HOSPITAL | Age: 34
End: 2024-04-27
Payer: COMMERCIAL

## 2024-04-27 LAB
ANION GAP SERPL CALC-SCNC: 10 MMOL/L (ref 8–16)
BASOPHILS # BLD AUTO: 0.06 K/UL (ref 0–0.2)
BASOPHILS NFR BLD: 0.7 % (ref 0–1.9)
BUN SERPL-MCNC: 13 MG/DL (ref 6–20)
CALCIUM SERPL-MCNC: 8.6 MG/DL (ref 8.7–10.5)
CHLORIDE SERPL-SCNC: 101 MMOL/L (ref 95–110)
CO2 SERPL-SCNC: 23 MMOL/L (ref 23–29)
CREAT SERPL-MCNC: 0.7 MG/DL (ref 0.5–1.4)
DIFFERENTIAL METHOD BLD: ABNORMAL
EOSINOPHIL # BLD AUTO: 0 K/UL (ref 0–0.5)
EOSINOPHIL NFR BLD: 0.1 % (ref 0–8)
ERYTHROCYTE [DISTWIDTH] IN BLOOD BY AUTOMATED COUNT: 14.6 % (ref 11.5–14.5)
EST. GFR  (NO RACE VARIABLE): >60 ML/MIN/1.73 M^2
GLUCOSE SERPL-MCNC: 101 MG/DL (ref 70–110)
HCT VFR BLD AUTO: 34.6 % (ref 37–48.5)
HGB BLD-MCNC: 11.7 G/DL (ref 12–16)
IMM GRANULOCYTES # BLD AUTO: 0.03 K/UL (ref 0–0.04)
IMM GRANULOCYTES NFR BLD AUTO: 0.4 % (ref 0–0.5)
LYMPHOCYTES # BLD AUTO: 1.1 K/UL (ref 1–4.8)
LYMPHOCYTES NFR BLD: 13.1 % (ref 18–48)
MCH RBC QN AUTO: 28.2 PG (ref 27–31)
MCHC RBC AUTO-ENTMCNC: 33.8 G/DL (ref 32–36)
MCV RBC AUTO: 83 FL (ref 82–98)
MONOCYTES # BLD AUTO: 0.6 K/UL (ref 0.3–1)
MONOCYTES NFR BLD: 6.9 % (ref 4–15)
NEUTROPHILS # BLD AUTO: 6.4 K/UL (ref 1.8–7.7)
NEUTROPHILS NFR BLD: 78.8 % (ref 38–73)
NRBC BLD-RTO: 0 /100 WBC
PLATELET # BLD AUTO: 265 K/UL (ref 150–450)
PMV BLD AUTO: 9.9 FL (ref 9.2–12.9)
POCT GLUCOSE: 121 MG/DL (ref 70–110)
POTASSIUM SERPL-SCNC: 4 MMOL/L (ref 3.5–5.1)
RBC # BLD AUTO: 4.15 M/UL (ref 4–5.4)
SODIUM SERPL-SCNC: 134 MMOL/L (ref 136–145)
WBC # BLD AUTO: 8.14 K/UL (ref 3.9–12.7)

## 2024-04-27 PROCEDURE — 85025 COMPLETE CBC W/AUTO DIFF WBC: CPT | Performed by: INTERNAL MEDICINE

## 2024-04-27 PROCEDURE — 25000003 PHARM REV CODE 250: Performed by: INTERNAL MEDICINE

## 2024-04-27 PROCEDURE — 80048 BASIC METABOLIC PNL TOTAL CA: CPT | Performed by: INTERNAL MEDICINE

## 2024-04-27 PROCEDURE — 37000008 HC ANESTHESIA 1ST 15 MINUTES: Performed by: ORTHOPAEDIC SURGERY

## 2024-04-27 PROCEDURE — G0378 HOSPITAL OBSERVATION PER HR: HCPCS

## 2024-04-27 PROCEDURE — 37000009 HC ANESTHESIA EA ADD 15 MINS: Performed by: ORTHOPAEDIC SURGERY

## 2024-04-27 PROCEDURE — 27828 TREAT LOWER LEG FRACTURE: CPT | Mod: RT,,, | Performed by: ORTHOPAEDIC SURGERY

## 2024-04-27 PROCEDURE — 36415 COLL VENOUS BLD VENIPUNCTURE: CPT | Performed by: INTERNAL MEDICINE

## 2024-04-27 PROCEDURE — 63600175 PHARM REV CODE 636 W HCPCS: Performed by: NURSE ANESTHETIST, CERTIFIED REGISTERED

## 2024-04-27 PROCEDURE — 36000708 HC OR TIME LEV III 1ST 15 MIN: Performed by: ORTHOPAEDIC SURGERY

## 2024-04-27 PROCEDURE — 71000039 HC RECOVERY, EACH ADD'L HOUR: Performed by: ORTHOPAEDIC SURGERY

## 2024-04-27 PROCEDURE — 96372 THER/PROPH/DIAG INJ SC/IM: CPT | Performed by: INTERNAL MEDICINE

## 2024-04-27 PROCEDURE — 25000003 PHARM REV CODE 250: Performed by: NURSE ANESTHETIST, CERTIFIED REGISTERED

## 2024-04-27 PROCEDURE — 96366 THER/PROPH/DIAG IV INF ADDON: CPT | Mod: 59

## 2024-04-27 PROCEDURE — 94761 N-INVAS EAR/PLS OXIMETRY MLT: CPT

## 2024-04-27 PROCEDURE — 64445 NJX AA&/STRD SCIATIC NRV IMG: CPT | Mod: 59,RT | Performed by: ANESTHESIOLOGY

## 2024-04-27 PROCEDURE — 96368 THER/DIAG CONCURRENT INF: CPT

## 2024-04-27 PROCEDURE — C1713 ANCHOR/SCREW BN/BN,TIS/BN: HCPCS | Performed by: ORTHOPAEDIC SURGERY

## 2024-04-27 PROCEDURE — 63600175 PHARM REV CODE 636 W HCPCS: Performed by: ANESTHESIOLOGY

## 2024-04-27 PROCEDURE — 63600175 PHARM REV CODE 636 W HCPCS: Performed by: INTERNAL MEDICINE

## 2024-04-27 PROCEDURE — 27201423 OPTIME MED/SURG SUP & DEVICES STERILE SUPPLY: Performed by: ORTHOPAEDIC SURGERY

## 2024-04-27 PROCEDURE — 99214 OFFICE O/P EST MOD 30 MIN: CPT | Mod: 57,,, | Performed by: ORTHOPAEDIC SURGERY

## 2024-04-27 PROCEDURE — 63600175 PHARM REV CODE 636 W HCPCS: Mod: JZ,JG | Performed by: ORTHOPAEDIC SURGERY

## 2024-04-27 PROCEDURE — 71000033 HC RECOVERY, INTIAL HOUR: Performed by: ORTHOPAEDIC SURGERY

## 2024-04-27 PROCEDURE — C1769 GUIDE WIRE: HCPCS | Performed by: ORTHOPAEDIC SURGERY

## 2024-04-27 PROCEDURE — 36000709 HC OR TIME LEV III EA ADD 15 MIN: Performed by: ORTHOPAEDIC SURGERY

## 2024-04-27 DEVICE — SCREW VARIAX2 BONE T8 2.4X12MM: Type: IMPLANTABLE DEVICE | Site: ANKLE | Status: FUNCTIONAL

## 2024-04-27 DEVICE — SCREW BONE NON LOCK 3.5X12MM: Type: IMPLANTABLE DEVICE | Site: ANKLE | Status: FUNCTIONAL

## 2024-04-27 DEVICE — SCREW BONE T10 3.5X26MM: Type: IMPLANTABLE DEVICE | Site: ANKLE | Status: FUNCTIONAL

## 2024-04-27 DEVICE — IMPLANTABLE DEVICE: Type: IMPLANTABLE DEVICE | Site: ANKLE | Status: FUNCTIONAL

## 2024-04-27 DEVICE — SCREW BONE LOCK T10 3.5X16MM: Type: IMPLANTABLE DEVICE | Site: ANKLE | Status: FUNCTIONAL

## 2024-04-27 DEVICE — SCREW BONE NON LOCK 3.5X22MM: Type: IMPLANTABLE DEVICE | Site: ANKLE | Status: FUNCTIONAL

## 2024-04-27 DEVICE — SCREW BONE NON LOCK 3.5X14MM: Type: IMPLANTABLE DEVICE | Site: ANKLE | Status: FUNCTIONAL

## 2024-04-27 RX ORDER — SODIUM CHLORIDE 0.9 % (FLUSH) 0.9 %
10 SYRINGE (ML) INJECTION
Status: DISCONTINUED | OUTPATIENT
Start: 2024-04-27 | End: 2024-04-28 | Stop reason: HOSPADM

## 2024-04-27 RX ORDER — CEFAZOLIN SODIUM 1 G/3ML
INJECTION, POWDER, FOR SOLUTION INTRAMUSCULAR; INTRAVENOUS
Status: DISCONTINUED | OUTPATIENT
Start: 2024-04-27 | End: 2024-04-27

## 2024-04-27 RX ORDER — PROPOFOL 10 MG/ML
VIAL (ML) INTRAVENOUS
Status: DISCONTINUED | OUTPATIENT
Start: 2024-04-27 | End: 2024-04-27

## 2024-04-27 RX ORDER — SODIUM CHLORIDE 0.9 % (FLUSH) 0.9 %
10 SYRINGE (ML) INJECTION
Status: DISCONTINUED | OUTPATIENT
Start: 2024-04-27 | End: 2024-04-27 | Stop reason: SDUPTHER

## 2024-04-27 RX ORDER — HYDROMORPHONE HYDROCHLORIDE 2 MG/ML
0.2 INJECTION, SOLUTION INTRAMUSCULAR; INTRAVENOUS; SUBCUTANEOUS EVERY 5 MIN PRN
Status: DISCONTINUED | OUTPATIENT
Start: 2024-04-27 | End: 2024-04-27 | Stop reason: SDUPTHER

## 2024-04-27 RX ORDER — BUPIVACAINE HYDROCHLORIDE 2.5 MG/ML
INJECTION, SOLUTION EPIDURAL; INFILTRATION; INTRACAUDAL
Status: DISCONTINUED | OUTPATIENT
Start: 2024-04-27 | End: 2024-04-27 | Stop reason: HOSPADM

## 2024-04-27 RX ORDER — ONDANSETRON HYDROCHLORIDE 2 MG/ML
4 INJECTION, SOLUTION INTRAVENOUS DAILY PRN
Status: DISCONTINUED | OUTPATIENT
Start: 2024-04-27 | End: 2024-04-28 | Stop reason: HOSPADM

## 2024-04-27 RX ORDER — DIPHENHYDRAMINE HYDROCHLORIDE 50 MG/ML
25 INJECTION INTRAMUSCULAR; INTRAVENOUS EVERY 6 HOURS PRN
Status: DISCONTINUED | OUTPATIENT
Start: 2024-04-27 | End: 2024-04-28 | Stop reason: HOSPADM

## 2024-04-27 RX ORDER — ONDANSETRON HYDROCHLORIDE 2 MG/ML
INJECTION, SOLUTION INTRAVENOUS
Status: DISCONTINUED | OUTPATIENT
Start: 2024-04-27 | End: 2024-04-27

## 2024-04-27 RX ORDER — MIDAZOLAM HYDROCHLORIDE 1 MG/ML
INJECTION INTRAMUSCULAR; INTRAVENOUS
Status: DISCONTINUED | OUTPATIENT
Start: 2024-04-27 | End: 2024-04-27

## 2024-04-27 RX ORDER — HYDROMORPHONE HYDROCHLORIDE 2 MG/ML
0.2 INJECTION, SOLUTION INTRAMUSCULAR; INTRAVENOUS; SUBCUTANEOUS EVERY 5 MIN PRN
Status: DISCONTINUED | OUTPATIENT
Start: 2024-04-27 | End: 2024-04-28 | Stop reason: HOSPADM

## 2024-04-27 RX ORDER — HYDROMORPHONE HYDROCHLORIDE 2 MG/ML
INJECTION, SOLUTION INTRAMUSCULAR; INTRAVENOUS; SUBCUTANEOUS
Status: DISCONTINUED | OUTPATIENT
Start: 2024-04-27 | End: 2024-04-27

## 2024-04-27 RX ORDER — FENTANYL CITRATE 50 UG/ML
INJECTION, SOLUTION INTRAMUSCULAR; INTRAVENOUS
Status: DISCONTINUED | OUTPATIENT
Start: 2024-04-27 | End: 2024-04-27

## 2024-04-27 RX ORDER — MORPHINE SULFATE 4 MG/ML
2 INJECTION, SOLUTION INTRAMUSCULAR; INTRAVENOUS EVERY 5 MIN PRN
Status: DISCONTINUED | OUTPATIENT
Start: 2024-04-27 | End: 2024-04-27 | Stop reason: SDUPTHER

## 2024-04-27 RX ORDER — MEPERIDINE HYDROCHLORIDE 25 MG/ML
12.5 INJECTION INTRAMUSCULAR; INTRAVENOUS; SUBCUTANEOUS ONCE AS NEEDED
Status: ACTIVE | OUTPATIENT
Start: 2024-04-27 | End: 2024-04-28

## 2024-04-27 RX ORDER — ROPIVACAINE HYDROCHLORIDE 2 MG/ML
INJECTION, SOLUTION EPIDURAL; INFILTRATION
Status: DISCONTINUED | OUTPATIENT
Start: 2024-04-27 | End: 2024-04-27

## 2024-04-27 RX ORDER — ONDANSETRON HYDROCHLORIDE 2 MG/ML
4 INJECTION, SOLUTION INTRAVENOUS DAILY PRN
Status: DISCONTINUED | OUTPATIENT
Start: 2024-04-27 | End: 2024-04-27 | Stop reason: SDUPTHER

## 2024-04-27 RX ORDER — MORPHINE SULFATE 4 MG/ML
2 INJECTION, SOLUTION INTRAMUSCULAR; INTRAVENOUS EVERY 5 MIN PRN
Status: DISCONTINUED | OUTPATIENT
Start: 2024-04-27 | End: 2024-04-28 | Stop reason: HOSPADM

## 2024-04-27 RX ORDER — DEXMEDETOMIDINE HYDROCHLORIDE 100 UG/ML
INJECTION, SOLUTION INTRAVENOUS
Status: DISCONTINUED | OUTPATIENT
Start: 2024-04-27 | End: 2024-04-27

## 2024-04-27 RX ORDER — DIPHENHYDRAMINE HYDROCHLORIDE 50 MG/ML
25 INJECTION INTRAMUSCULAR; INTRAVENOUS EVERY 6 HOURS PRN
Status: DISCONTINUED | OUTPATIENT
Start: 2024-04-27 | End: 2024-04-27 | Stop reason: SDUPTHER

## 2024-04-27 RX ORDER — ROCURONIUM BROMIDE 10 MG/ML
INJECTION, SOLUTION INTRAVENOUS
Status: DISCONTINUED | OUTPATIENT
Start: 2024-04-27 | End: 2024-04-27

## 2024-04-27 RX ORDER — MEPERIDINE HYDROCHLORIDE 25 MG/ML
12.5 INJECTION INTRAMUSCULAR; INTRAVENOUS; SUBCUTANEOUS ONCE AS NEEDED
Status: DISCONTINUED | OUTPATIENT
Start: 2024-04-27 | End: 2024-04-27 | Stop reason: SDUPTHER

## 2024-04-27 RX ADMIN — ROCURONIUM BROMIDE 30 MG: 10 INJECTION, SOLUTION INTRAVENOUS at 12:04

## 2024-04-27 RX ADMIN — ROCURONIUM BROMIDE 20 MG: 10 INJECTION, SOLUTION INTRAVENOUS at 01:04

## 2024-04-27 RX ADMIN — DIAZEPAM 10 MG: 5 TABLET ORAL at 09:04

## 2024-04-27 RX ADMIN — ENOXAPARIN SODIUM 40 MG: 40 INJECTION SUBCUTANEOUS at 06:04

## 2024-04-27 RX ADMIN — TRAZODONE HYDROCHLORIDE 50 MG: 50 TABLET ORAL at 08:04

## 2024-04-27 RX ADMIN — SUGAMMADEX 200 MG: 100 INJECTION, SOLUTION INTRAVENOUS at 03:04

## 2024-04-27 RX ADMIN — ONDANSETRON 4 MG: 2 INJECTION INTRAMUSCULAR; INTRAVENOUS at 02:04

## 2024-04-27 RX ADMIN — CEFAZOLIN 2 G: 330 INJECTION, POWDER, FOR SOLUTION INTRAMUSCULAR; INTRAVENOUS at 12:04

## 2024-04-27 RX ADMIN — BUSPIRONE HYDROCHLORIDE 10 MG: 10 TABLET ORAL at 08:04

## 2024-04-27 RX ADMIN — DIAZEPAM 10 MG: 5 TABLET ORAL at 05:04

## 2024-04-27 RX ADMIN — FOLIC ACID 1 MG: 1 TABLET ORAL at 08:04

## 2024-04-27 RX ADMIN — OXYCODONE 5 MG: 5 TABLET ORAL at 07:04

## 2024-04-27 RX ADMIN — ROPIVACAINE HYDROCHLORIDE 20 ML: 2 INJECTION, SOLUTION EPIDURAL; INFILTRATION at 04:04

## 2024-04-27 RX ADMIN — CLONIDINE HYDROCHLORIDE 0.1 MG: 0.1 TABLET ORAL at 08:04

## 2024-04-27 RX ADMIN — THIAMINE HYDROCHLORIDE 100 MG: 100 INJECTION, SOLUTION INTRAMUSCULAR; INTRAVENOUS at 08:04

## 2024-04-27 RX ADMIN — ROCURONIUM BROMIDE 50 MG: 10 INJECTION, SOLUTION INTRAVENOUS at 12:04

## 2024-04-27 RX ADMIN — FENTANYL CITRATE 100 MCG: 50 INJECTION, SOLUTION INTRAMUSCULAR; INTRAVENOUS at 12:04

## 2024-04-27 RX ADMIN — MIDAZOLAM HYDROCHLORIDE 2 MG: 1 INJECTION, SOLUTION INTRAMUSCULAR; INTRAVENOUS at 11:04

## 2024-04-27 RX ADMIN — HYDROMORPHONE HYDROCHLORIDE 1 MG: 2 INJECTION INTRAMUSCULAR; INTRAVENOUS; SUBCUTANEOUS at 02:04

## 2024-04-27 RX ADMIN — SODIUM CHLORIDE, POTASSIUM CHLORIDE, SODIUM LACTATE AND CALCIUM CHLORIDE: 600; 310; 30; 20 INJECTION, SOLUTION INTRAVENOUS at 11:04

## 2024-04-27 RX ADMIN — DEXMEDETOMIDINE 50 MCG: 200 INJECTION, SOLUTION INTRAVENOUS at 01:04

## 2024-04-27 RX ADMIN — PROPOFOL 200 MG: 10 INJECTION, EMULSION INTRAVENOUS at 12:04

## 2024-04-27 RX ADMIN — ROCURONIUM BROMIDE 30 MG: 10 INJECTION, SOLUTION INTRAVENOUS at 02:04

## 2024-04-27 RX ADMIN — VENLAFAXINE HYDROCHLORIDE 150 MG: 150 CAPSULE, EXTENDED RELEASE ORAL at 08:04

## 2024-04-27 RX ADMIN — HYDROMORPHONE HYDROCHLORIDE 1 MG: 2 INJECTION INTRAMUSCULAR; INTRAVENOUS; SUBCUTANEOUS at 01:04

## 2024-04-27 NOTE — ANESTHESIA PROCEDURE NOTES
Peripheral Block    Patient location during procedure: post-op   Block not for primary anesthetic.  Reason for block: at surgeon's request and post-op pain management   Post-op Pain Location: right Ankle ORIF   Start time: 4/27/2024 4:20 PM  Timeout: 4/27/2024 4:18 PM   End time: 4/27/2024 4:28 PM    Staffing  Authorizing Provider: Song Ortega MD  Performing Provider: Song Ortega MD    Staffing  Performed by: Song Ortega MD  Authorized by: Song Ortega MD    Preanesthetic Checklist  Completed: patient identified, IV checked, site marked, risks and benefits discussed, surgical consent, monitors and equipment checked, pre-op evaluation and timeout performed  Peripheral Block  Patient position: supine  Prep: ChloraPrep  Patient monitoring: heart rate, cardiac monitor and continuous pulse ox  Block type: popliteal  Laterality: right  Injection technique: single shot  Needle  Needle type: Stimuplex   Needle gauge: 22 G  Needle length: 4 in  Needle localization: anatomical landmarks, nerve stimulator and ultrasound guidance     Assessment  Injection assessment: negative aspiration, negative parasthesia and local visualized surrounding nerve  Paresthesia pain: none  Heart rate change: no  Slow fractionated injection: yes  Pain Tolerance: comfortable throughout block  Medications:    Medications: ropivacaine (NAROPIN) solution 0.2% - Perineural, Left Popliteal   20 mL - 4/27/2024 4:28:00 PM

## 2024-04-27 NOTE — PROGRESS NOTES
[facility] Medicine  Progress Note    Patient Name: Esther Romo  MRN: [id number]  Patient Class: OP- Observation   Admission Date: 4/26/2024  Length of Stay: 0 days  Attending Physician: Em Brown MD  Primary Care Provider: Lanie, Primary Doctor        Subjective:     Principal Problem:Closed fracture dislocation of right ankle joint, initial encounter        HPI:  Patient is a 34-year-old female with a past medical history of hypertension, alcohol abuse, depression, tachycardia, elevated LFTs who currently is residing in a hotel.  After drinking last night she fell and fractured her ankle.  She presented to the emergency department and pain.  The fracture was reduced and splinted in the emergency department.  Orthopedics was consulted ensure we will require operative management.  Patient to be admitted by hospital medicine.    Patient has a history of significant alcohol abuse.  She continues to drink.  She denies any other illicit drugs at this time.  Upon presentation after drinking immediately prior to calling her alcohol level was 360.  During her stay in the ER she became more tachycardic as well as diaphoretic and was started on benzodiazepines for withdrawal.      Overview/Hospital Course:  No notes on file    Interval History:  Patient seen and examined preoperatively.  Operative plan discharge plans discussed patient with family at bedside.    Review of Systems   Constitutional:  Negative for diaphoresis, fatigue and fever.   Respiratory:  Negative for cough and shortness of breath.    Cardiovascular:  Positive for palpitations. Negative for chest pain and leg swelling.   Gastrointestinal:  Negative for nausea and vomiting.   Musculoskeletal:  Positive for arthralgias and joint swelling.   Neurological:  Positive for weakness.   Psychiatric/Behavioral:  The patient is nervous/anxious.    All other systems reviewed and are negative.    Objective:     Vital Signs (Most  Recent):  Temp: 97.9 °F (36.6 °C) (04/27/24 1716)  Pulse: 100 (04/27/24 1716)  Resp: 17 (04/27/24 1716)  BP: 138/83 (04/27/24 1716)  SpO2: (!) 94 % (04/27/24 1716) Vital Signs (24h Range):  Temp:  [97.5 °F (36.4 °C)-99.3 °F (37.4 °C)] 97.9 °F (36.6 °C)  Pulse:  [] 100  Resp:  [16-20] 17  SpO2:  [92 %-99 %] 94 %  BP: (115-164)/(56-90) 138/83     Weight: 82.4 kg (181 lb 10.5 oz)  Body mass index is 33.23 kg/m².    Intake/Output Summary (Last 24 hours) at 4/27/2024 1823  Last data filed at 4/27/2024 1544  Gross per 24 hour   Intake 1500 ml   Output 30 ml   Net 1470 ml         Physical Exam  Vitals reviewed.   Constitutional:       Appearance: Normal appearance. She is ill-appearing.   HENT:      Head: Normocephalic and atraumatic.      Mouth/Throat:      Mouth: Mucous membranes are moist.      Pharynx: Oropharynx is clear.   Eyes:      Extraocular Movements: Extraocular movements intact.      Conjunctiva/sclera: Conjunctivae normal.   Cardiovascular:      Rate and Rhythm: Regular rhythm. Tachycardia present.      Pulses: Normal pulses.      Heart sounds: Normal heart sounds.   Pulmonary:      Effort: Pulmonary effort is normal.      Breath sounds: Normal breath sounds.   Abdominal:      General: Bowel sounds are normal.      Palpations: Abdomen is soft.   Musculoskeletal:         General: Normal range of motion.      Cervical back: Normal range of motion and neck supple.      Comments: Right ankle splinted in cast   Skin:     General: Skin is warm.   Neurological:      General: No focal deficit present.      Mental Status: She is alert and oriented to person, place, and time. Mental status is at baseline.      Motor: Weakness present.   Psychiatric:         Mood and Affect: Mood normal.         Behavior: Behavior normal.         Thought Content: Thought content normal.             Significant Labs: All pertinent labs within the past 24 hours have been reviewed.  CBC:   Recent Labs   Lab 04/26/24  1213  04/27/24  0510   WBC 9.12 8.14   HGB 14.7 11.7*   HCT 43.0 34.6*    265     CMP:   Recent Labs   Lab 04/26/24  1213 04/27/24  0510    134*   K 4.5 4.0   CL 99 101   CO2 21* 23    101   BUN 11 13   CREATININE 0.8 0.7   CALCIUM 9.1 8.6*   PROT 8.1  --    ALBUMIN 4.0  --    BILITOT 0.4  --    ALKPHOS 82  --    AST 24  --    ALT 25  --    ANIONGAP 18* 10       Significant Imaging: I have reviewed all pertinent imaging results/findings within the past 24 hours.    Assessment/Plan:      * Closed fracture dislocation of right ankle joint, initial encounter  Status post fall several hours prior to presentation with by malleolar right ankle fracture  Orthopedics consulted  Reduced and splinted in ER  Patient will go to OR for ORIF in a.m..    Per ortho  4.27.24 - ORIF R Pilon/trimal ankle fracture  Antibiotics x 24 hr  ASA 81 mg b.i.d. x6 weeks for DVT prophylaxis  Nonweightbearing R lower extremity x6 weeks postop.   Calcium, vitamin-D     Follow-up 2 weeks postop for removal of splint, suture removal, placement into a boot     X-rays at subsequent followups:  R ankle     Follow-up postop 2 weeks, 6 weeks, 3 months, 6 months, 1 year    Alcohol abuse    Patient has a history of alcohol use disorder  She reports she has been in rehabs numerous times  Last drink on day of admission  We will monitor for alcohol withdrawal    Alcohol withdrawal    Upon presentation patient's alcohol level was 360  2 hours later patient began with tachycardic diaphoresis and anxiety.  She will be treated with IV hydration, thiamine, multivitamin, and benzodiazepines p.r.n.      VTE Risk Mitigation (From admission, onward)           Ordered     enoxaparin injection 40 mg  Daily         04/26/24 1548     IP VTE HIGH RISK PATIENT  Once         04/26/24 1548     Place sequential compression device  Until discontinued         04/26/24 1548                    Discharge Planning   LATRICE:      Code Status: Full Code   Is the patient  medically ready for discharge?:     Reason for patient still in hospital (select all that apply): PT / OT recommendations                     Em Espinosa MD  Department of Hospital Medicine   [facility] Med [facility]urg

## 2024-04-27 NOTE — ANESTHESIA PREPROCEDURE EVALUATION
04/27/2024  Esther Romo is a 34 y.o., female.      Pre-op Assessment          Review of Systems  Anesthesia Hx:  No problems with previous Anesthesia               Denies Personal Hx of Anesthesia complications.                    Social:  Smoker, Alcohol Use, Recreational Drugs + Etoh abuse  Previous history of cocaine abuse per notes      Hematology/Oncology:       -- Anemia:                                  Cardiovascular:     Hypertension              ECG has been reviewed. Rate of 118 beats per minute.  Sinus rhythm.  Normal axis.  No ST segment elevation.  No STEMI                         Neurological:           Had alcohol withdrawal seizure in past                             Psych:    depression              Physical Exam  General: Alert    Airway:  Mallampati: II / II  Mouth Opening: Small, but > 3cm  TM Distance: 4 - 6 cm  Tongue: Normal  Neck ROM: Normal ROM    Dental:  Intact    Chest/Lungs:  Normal Respiratory Rate    Heart:  Rate: Normal    Anesthesia Plan  Type of Anesthesia, risks & benefits discussed:    Anesthesia Type: Gen ETT  Intra-op Monitoring Plan: Standard ASA Monitors  Post Op Pain Control Plan: multimodal analgesia, IV/PO Opioids PRN and peripheral nerve block  Induction:  IV and rapid sequence  Airway Plan: Direct and Video, Post-Induction  ASA Score: 3 Emergent    Ready For Surgery From Anesthesia Perspective.     .

## 2024-04-27 NOTE — SUBJECTIVE & OBJECTIVE
Interval History:  Patient seen and examined preoperatively.  Operative plan discharge plans discussed patient with family at bedside.    Review of Systems   Constitutional:  Negative for diaphoresis, fatigue and fever.   Respiratory:  Negative for cough and shortness of breath.    Cardiovascular:  Positive for palpitations. Negative for chest pain and leg swelling.   Gastrointestinal:  Negative for nausea and vomiting.   Musculoskeletal:  Positive for arthralgias and joint swelling.   Neurological:  Positive for weakness.   Psychiatric/Behavioral:  The patient is nervous/anxious.    All other systems reviewed and are negative.    Objective:     Vital Signs (Most Recent):  Temp: 97.9 °F (36.6 °C) (04/27/24 1716)  Pulse: 100 (04/27/24 1716)  Resp: 17 (04/27/24 1716)  BP: 138/83 (04/27/24 1716)  SpO2: (!) 94 % (04/27/24 1716) Vital Signs (24h Range):  Temp:  [97.5 °F (36.4 °C)-99.3 °F (37.4 °C)] 97.9 °F (36.6 °C)  Pulse:  [] 100  Resp:  [16-20] 17  SpO2:  [92 %-99 %] 94 %  BP: (115-164)/(56-90) 138/83     Weight: 82.4 kg (181 lb 10.5 oz)  Body mass index is 33.23 kg/m².    Intake/Output Summary (Last 24 hours) at 4/27/2024 1823  Last data filed at 4/27/2024 1544  Gross per 24 hour   Intake 1500 ml   Output 30 ml   Net 1470 ml         Physical Exam  Vitals reviewed.   Constitutional:       Appearance: Normal appearance. She is ill-appearing.   HENT:      Head: Normocephalic and atraumatic.      Mouth/Throat:      Mouth: Mucous membranes are moist.      Pharynx: Oropharynx is clear.   Eyes:      Extraocular Movements: Extraocular movements intact.      Conjunctiva/sclera: Conjunctivae normal.   Cardiovascular:      Rate and Rhythm: Regular rhythm. Tachycardia present.      Pulses: Normal pulses.      Heart sounds: Normal heart sounds.   Pulmonary:      Effort: Pulmonary effort is normal.      Breath sounds: Normal breath sounds.   Abdominal:      General: Bowel sounds are normal.      Palpations: Abdomen is soft.    Musculoskeletal:         General: Normal range of motion.      Cervical back: Normal range of motion and neck supple.      Comments: Right ankle splinted in cast   Skin:     General: Skin is warm.   Neurological:      General: No focal deficit present.      Mental Status: She is alert and oriented to person, place, and time. Mental status is at baseline.      Motor: Weakness present.   Psychiatric:         Mood and Affect: Mood normal.         Behavior: Behavior normal.         Thought Content: Thought content normal.             Significant Labs: All pertinent labs within the past 24 hours have been reviewed.  CBC:   Recent Labs   Lab 04/26/24  1213 04/27/24  0510   WBC 9.12 8.14   HGB 14.7 11.7*   HCT 43.0 34.6*    265     CMP:   Recent Labs   Lab 04/26/24  1213 04/27/24  0510    134*   K 4.5 4.0   CL 99 101   CO2 21* 23    101   BUN 11 13   CREATININE 0.8 0.7   CALCIUM 9.1 8.6*   PROT 8.1  --    ALBUMIN 4.0  --    BILITOT 0.4  --    ALKPHOS 82  --    AST 24  --    ALT 25  --    ANIONGAP 18* 10       Significant Imaging: I have reviewed all pertinent imaging results/findings within the past 24 hours.

## 2024-04-27 NOTE — ANESTHESIA PROCEDURE NOTES
Intubation    Date/Time: 4/27/2024 12:04 PM    Performed by: Curtis Campbell CRNA  Authorized by: Song Ortega MD    Intubation:     Induction:  Intravenous    Intubated:  Postinduction    Mask Ventilation:  Easy mask    Attempts:  1    Attempted By:  CRNA    Method of Intubation:  Direct    Blade:  Chris 3    Laryngeal View Grade: Grade I - full view of cords      Difficult Airway Encountered?: No      Complications:  None    Airway Device:  Oral endotracheal tube    Airway Device Size:  7.5    Style/Cuff Inflation:  Cuffed (inflated to minimal occlusive pressure)    Tube secured:  22    Placement Verified By:  Capnometry    Complicating Factors:  None    Findings Post-Intubation:  BS equal bilateral and atraumatic/condition of teeth unchanged

## 2024-04-27 NOTE — ASSESSMENT & PLAN NOTE
Status post fall several hours prior to presentation with by malleolar right ankle fracture  Orthopedics consulted  Reduced and splinted in ER  Patient will go to OR for ORIF in a.m..    Per ortho  4.27.24 - ORIF R Pilon/trimal ankle fracture  Antibiotics x 24 hr  ASA 81 mg b.i.d. x6 weeks for DVT prophylaxis  Nonweightbearing R lower extremity x6 weeks postop.   Calcium, vitamin-D     Follow-up 2 weeks postop for removal of splint, suture removal, placement into a boot     X-rays at subsequent followups:  R ankle     Follow-up postop 2 weeks, 6 weeks, 3 months, 6 months, 1 year

## 2024-04-27 NOTE — OP NOTE
OPERATIVE NOTE     DATE OF PROCEDURE:  04/27/2024     PREOPERATIVE DIAGNOSIS:           Right ankle pilon fracture, closed, displaced, initial encounter  Fall from standing     POSTOPERATIVE DIAGNOSIS:         Right ankle pilon fracture, closed, displaced, initial encounter  Fall from standing     PROCEDURE:              Open reduction internal fixation right ankle pilon fracture fixation both distal tibia and fibula     SURGEON:       Ivan Mcdonnell MD     ASSISTANT:                 none     ANESTHESIA:              General     EBL:                  30 mL     COMPLICATIONS:  None     IMPLANTS:       Hector  Fibula  2.4 mm cortical lag screw, x1  Variax 2 1/3 tubular plate  3.5 mm cortical screw, x3  3.5 mm locking screw, x1  Tibia  Medial malleolus   3.5 mm cortical screw, x1  Posterior malleolus   Variax 2 1/3 tubular plate  3.5 mm cortical screw, x3    SPECIMENS:    None     INDICATIONS FOR PROCEDURE:  34F, ground level fall 04/26/2024  Right ankle fracture dislocation   Came to emergency department were closed reduction and splinting were performed by the emergency department.  She was admitted to the hospital for further management.     Lives at home with father   Independent community ambulator   Daily drinker  Smoker   Denies diabetes, history of heart attack, stroke, blood clot, cancer     At time my evaluation patient complained isolated pain in right ankle  Pain 7/10, sharp, stabbing, worse with splint   No numbness no tingling  Denies prior ankle injuries or surgeries    Right ankle fracture dislocation, pilon variant with a large medial and posterior medial fracture line through the distal tibial articular surface as well as a transverse Servin C fibula fracture with additional oblique nondisplaced Servin B fracture line through the distal fibula.       Counseled patient on diagnosis   Discussed both non operative operative management options   Non operative management in an unstable ankle  fracture likely result in suboptimal alignment, worse overall long-term outcome.    Discussed operative intervention in the form of open reduction internal fixation, versus staged external fixator placement if the soft tissue swelling is not amenable for definitive fixation.  Hopefully this would improve alignment, stability, both early and long-term outcome.      The risks, benefits, and alternatives to surgery were discussed with the patient and/or family.    Specific risks discussed included, but were not limited to:  Ankle pain, stiffness, need for multiple staged surgeries, painful scars, numbness, tingling, instability, ankle arthritis, persistent swelling, damage to nearby structures, including neurovascular structures leading to loss of function or loss of limb, bleeding, need for blood transfusion, pain, stiffness, scarring, numbness, tingling, weakness, compartment syndrome, malunion/nonunion, hardware failure, hardware prominence, infection, need for multiple staged procedures, prolonged antibiotics, iatrogenic fracture, heterotopic ossification, arthritis, a variety of medical complications including but not limited to heart attack, stroke, deep venous thrombosis, pulmonary embolism, prolonged hospitalization, prolonged intubation, and death.   Patient and/or family expressed an understanding and desires to proceed with surgery.   All questions were answered.  No guarantees were implied or stated.  Informed consent was obtained.      OPERATIVE PROCEDURE:  Patient met in the preoperative hold area and the correct site and side of surgery being the right lower extremity were marked and verified.  Preoperative regional block placed by anesthesia.  Patient brought back to the operative suite.  General anesthesia smoothly induced.  Patient transferred over to operative table.  Placed in prone position. All bony prominences were appropriately padded.  Patient received 2 g Ancef for preoperative antibiotics.   The right lower extremity was then prepped and draped in normal sterile fashion.     Time-out was performed verifying the correct patient, site/side of surgery, surgical consent, radiographs as applicable, preop antibiotics, necessary equipment, anticipated blood loss, length of procedure, postoperative disposition.     Esmarch was utilized, tourniquet was inflated at 300 mm mercury for approximately 110 min during the case.    We performed a posterolateral approach to the ankle.  This was between the peroneal tendons and Achilles.  Skin incised with scalpel.  Hemostasis achieved as needed with electrocautery.  Incised the fascia.  Retracted the peroneals laterally, incised the FHL fascia used a Benitez to elevate the FHL off the posterior tibia.  Identified the posterior tibia.  Identified the fracture.  Bluntly dissected both proximally and centrally to identify the fracture and all later plate placement.  We also worked through a separate lateral interval retracting the peroneal tendons medially, identified the fibula, using a Benitez to bluntly dissect proximally, and elevating fascia and periosteum with a knife as needed about the fracture.  We identified the transverse fracture of the fibular shaft at this level.  We exposed enough distally and proximally for later plate placement.  We then worked sequentially through both intervals, cleaned out both fractures with the posterior tibial articular surface as well as the lateral malleolus with a curette, and then irrigated out the fracture sites.    We then performed manual reduction maneuvers using ball spike, bumps, as needed.  We also used multiple pointed reduction clamps to anatomically key in the fibula by visual keys.  We are then able to anatomically reduce the posterior articular surface of the distal tibia by visual keys keyed in the cortical surface.  We then placed a couple wires to hold this in place.  We assessed fracture reduction on fluoroscopy and were  satisfied.    In order to we have more space and be able to remove some of the clamps, we chose to place a lag screw in the fibula.  This was placed perpendicular fracture.  We used a countersunk 2.4 mm cortical screw from posterior to anterior.  This held the fracture and allowed us to remove the clamps.      We then turned our attention to the posterior tibial articular surface.  Reduction at already been performed.  We chose a 1/3 tubular plate.  We contoured appropriately.  It was wired in place.  We placed an apex screw, which sucked the plate down nicely and further compressed the fracture.  We then placed a lag screw by technique just proximal to the articular surface.  We placed a 3rd screw along shaft for further fixation.      We then turned our attention to fixation of the fibula.  It had already been reduced.  We chosen appropriate size 1/3 tubular plate.  It was pinned in place in the posterior aspect of the fibula.  We then placed 2 screws both proximal and distal to function as a neutralization plate.  The most distal screw was later changed out for an appropriate size locking screw.     We then assessed our fracture reduction hardware placement both visually and on fluoroscopic imaging were satisfied.  External rotation stress test, Cotton test did not indicate significant syndesmotic widening.      There was a separate posterior medial fracture segment, and we planned for separate medial approach for placement of screw plate as needed.  We then performed medial approach to the ankle.  This was 2-3 cm incision.  Sharply and bluntly dissected down, retracted the saphenous vein and nerve anteriorly.  Incised down to the periosteum.  The periosteum was intact at this area.  We localize the posterior tib tendon and then planned for an independent screw fixation as the fracture appeared to be well aligned.  Just anterior to the posterior tib tendon, proximal to the joint line, we placed a screw from  medial, 2 lateral, angling slightly anterior.  Fluoroscopy confirmed appropriate fracture reduction and hardware placement.    Tourniquet let down at approximately 110 minutes in the case.  Hemostasis achieved as needed with direct pressure and electrocautery.    Wounds irrigated with saline.  Hemostasis achieved as needed with electrocautery.   Fascia closed with 2 O Vicryl.  Deep tissue closed with 2 O Vicryl.  Subcutaneous tissue closed with 3 O Vicryl.  Skin closed with 3 O nylon.      Xeroform, dry gauze, cast padding, a short-leg plaster splint with ankle in neutral dorsiflexion applied.     At the conclusion of procedure the patient had soft and compressible compartments, brisk cap refill, palpable DP/PT pulse in the operative extremity.     Prior to final closure all counts were confirmed to be correct.  Patient tolerated the procedure well without any complications, was awoken from anesthesia, transferred PACU for further recovery.     POSTOPERATIVE PLAN:  34F, + EtOH  Fall 4.26.24  R ankle trimal fx/dislocation/Pilon variant     4.27.24 - ORIF R Pilon/trimal ankle fracture     Antibiotics x 24 hr  ASA 81 mg b.i.d. x6 weeks for DVT prophylaxis  Nonweightbearing R lower extremity x6 weeks postop.     Calcium, vitamin-D       X-rays at subsequent followups:  R ankle (starting at 2-3 wks postop)     Follow-up postop   1 week - remove splint, dressing change, either short leg cast or plaster splint w/ posterior slab and stirrups to obtain neutral dorsiflexion  2-3 weeks (boot, suture removal, XR)  6 weeks, 3 months, 6 months, 1 year     =====================  Ivan Mcdonnell MD  Orthopaedic Surgery

## 2024-04-27 NOTE — TRANSFER OF CARE
"Anesthesia Transfer of Care Note    Patient: Esther Romo    Procedure(s) Performed: Procedure(s) (LRB):  ORIF, ANKLE (Right)    Patient location: PACU    Anesthesia Type: general    Transport from OR: Upon arrival to PACU/ICU, patient attached to 100% O2 by T-piece with adequate spontaneous ventilation    Post pain: adequate analgesia    Post assessment: no apparent anesthetic complications and tolerated procedure well    Post vital signs: stable    Level of consciousness: sedated    Nausea/Vomiting: no nausea/vomiting    Complications: none    Transfer of care protocol was followedComments: Pt kept intubated, t-piece in pacu breathing well      Last vitals: Visit Vitals  BP (!) 164/85 (Patient Position: Lying)   Pulse 110   Temp 36.4 °C (97.6 °F) (Oral)   Resp 18   Ht 5' 2" (1.575 m)   Wt 82.4 kg (181 lb 10.5 oz)   LMP 04/19/2024 (Approximate)   SpO2 99%   Breastfeeding No   BMI 33.23 kg/m²     "

## 2024-04-27 NOTE — ASSESSMENT & PLAN NOTE
Patient has a history of alcohol use disorder  She reports she has been in rehabs numerous times  Last drink today  We will monitor for alcohol withdrawal

## 2024-04-27 NOTE — H&P
[facility] Medicine  History & Physical    Patient Name: Esther Romo  MRN: [id number]  Patient Class: OP- Observation  Admission Date: 4/26/2024  Attending Physician: Em Brown MD   Primary Care Provider: Lanie Primary Doctor         Patient information was obtained from patient, past medical records, and ER records.     Subjective:     Principal Problem:Closed fracture dislocation of right ankle joint, initial encounter    Chief Complaint:   Chief Complaint   Patient presents with    Right Ankle Pain     Per EMS patient was a a motel drinking, when she fell, injuring her right ankle. Pain rating 6/10. Per EMS deformity present. Pt has been drinking alcohol since last night.         HPI: Patient is a 34-year-old female with a past medical history of hypertension, alcohol abuse, depression, tachycardia, elevated LFTs who currently is residing in a hotel.  After drinking last night she fell and fractured her ankle.  She presented to the emergency department and pain.  The fracture was reduced and splinted in the emergency department.  Orthopedics was consulted ensure we will require operative management.  Patient to be admitted by hospital medicine.    Patient has a history of significant alcohol abuse.  She continues to drink.  She denies any other illicit drugs at this time.  Upon presentation after drinking immediately prior to calling her alcohol level was 360.  During her stay in the ER she became more tachycardic as well as diaphoretic and was started on benzodiazepines for withdrawal.      Past Medical History:   Diagnosis Date    Alcohol use, unspecified with unspecified alcohol-induced disorder     Anxiety     Hypertension     Miscarriage        Past Surgical History:   Procedure Laterality Date    BREAST LUMPECTOMY         Review of patient's allergies indicates:  No Known Allergies    Current Facility-Administered Medications   Medication Dose Route Frequency Provider Last  Rate Last Admin    acetaminophen tablet 650 mg  650 mg Oral Q8H PRN Em Brown MD        acetaminophen tablet 650 mg  650 mg Oral Q4H PRN Em Brown MD        busPIRone tablet 10 mg  10 mg Oral BID Em Brown MD        chlordiazepoxide capsule 25 mg  25 mg Oral QID PRN Em Brown MD        cloNIDine tablet 0.1 mg  0.1 mg Oral TID Em Brown MD        dextrose 10% bolus 125 mL 125 mL  12.5 g Intravenous PRN Em Brown MD        dextrose 10% bolus 250 mL 250 mL  25 g Intravenous PRN Em Brown MD        diazePAM tablet 10 mg  10 mg Oral Q8H Em Brown MD        enoxaparin injection 40 mg  40 mg Subcutaneous Daily Em Brown MD   40 mg at 04/26/24 1626    [START ON 4/27/2024] folic acid tablet 1 mg  1 mg Oral Daily Em Brown MD        glucagon (human recombinant) injection 1 mg  1 mg Intramuscular PRN Em Brown MD        glucose chewable tablet 16 g  16 g Oral PRN Em Brown MD        glucose chewable tablet 24 g  24 g Oral PRN Em Brown MD        melatonin tablet 6 mg  6 mg Oral Nightly PRN Em Brown MD        morphine injection 4 mg  4 mg Intravenous Q6H PRN Em Brown MD        naloxone 0.4 mg/mL injection 0.02 mg  0.02 mg Intravenous PRN Em Brown MD        ondansetron injection 4 mg  4 mg Intravenous Q8H PRN Em Brown MD        oxyCODONE immediate release tablet 10 mg  10 mg Oral Q6H PRN Em Brown MD        oxyCODONE immediate release tablet 5 mg  5 mg Oral Q6H PRN Em Brown MD        polyethylene glycol packet 17 g  17 g Oral Daily Em Brown MD   17 g at 04/26/24 1625    senna-docusate 8.6-50 mg per tablet 1 tablet  1 tablet Oral BID Em Brown MD        simethicone chewable tablet 80 mg  1 tablet Oral QID PRN Em Brown MD        sodium chloride 0.9% 1,000 mL with  "mvi, (ADULT) no.4 with vit K 3,300 unit- 150 mcg/10 mL 10 mL infusion   Intravenous Daily Em Brown  mL/hr at 04/26/24 1705 New Bag at 04/26/24 1705    sodium chloride 0.9% flush 3 mL  3 mL Intravenous Q12H PRN Em Brown MD        [START ON 4/27/2024] thiamine (B-1) 100 mg in dextrose 5 % (D5W) 100 mL IVPB  100 mg Intravenous Daily Em Brown MD        Followed by    [START ON 4/29/2024] thiamine tablet 100 mg  100 mg Oral TID Em Brown MD        traZODone tablet 50 mg  50 mg Oral QHS Em Brown MD        venlafaxine 24 hr capsule 150 mg  150 mg Oral Daily Em Brown MD   150 mg at 04/26/24 1625     Family History       Problem Relation (Age of Onset)    No Known Problems Mother, Father          Tobacco Use    Smoking status: Every Day     Current packs/day: 0.50     Types: Cigarettes    Smokeless tobacco: Never   Substance and Sexual Activity    Alcohol use: Yes     Comment: "I'm an alcoholic"    Drug use: Yes     Types: Cocaine, Marijuana     Comment: ocassionally    Sexual activity: Not on file     Review of Systems   Constitutional:  Positive for diaphoresis. Negative for fatigue and fever.   Respiratory:  Negative for cough and shortness of breath.    Cardiovascular:  Positive for palpitations. Negative for chest pain and leg swelling.   Gastrointestinal:  Negative for nausea and vomiting.   Musculoskeletal:  Positive for arthralgias and joint swelling.   Neurological:  Positive for weakness.   Psychiatric/Behavioral:  The patient is nervous/anxious.    All other systems reviewed and are negative.    Objective:     Vital Signs (Most Recent):  Temp: 98.9 °F (37.2 °C) (04/26/24 1921)  Pulse: (!) 118 (04/26/24 1921)  Resp: 18 (04/26/24 1921)  BP: 139/83 (04/26/24 1921)  SpO2: 98 % (04/26/24 1921) Vital Signs (24h Range):  Temp:  [98 °F (36.7 °C)-98.9 °F (37.2 °C)] 98.9 °F (37.2 °C)  Pulse:  [] 118  Resp:  [18-20] 18  SpO2:  [94 %-99 " %] 98 %  BP: (119-139)/(73-97) 139/83     Weight: 81.6 kg (180 lb)  Body mass index is 32.92 kg/m².     Physical Exam  Vitals reviewed.   Constitutional:       Appearance: Normal appearance. She is ill-appearing and diaphoretic.   HENT:      Head: Normocephalic and atraumatic.      Mouth/Throat:      Mouth: Mucous membranes are moist.      Pharynx: Oropharynx is clear.   Eyes:      Extraocular Movements: Extraocular movements intact.      Conjunctiva/sclera: Conjunctivae normal.   Cardiovascular:      Rate and Rhythm: Regular rhythm. Tachycardia present.      Pulses: Normal pulses.      Heart sounds: Normal heart sounds.   Pulmonary:      Effort: Pulmonary effort is normal.      Breath sounds: Normal breath sounds.   Abdominal:      General: Bowel sounds are normal.      Palpations: Abdomen is soft.   Musculoskeletal:         General: Normal range of motion.      Cervical back: Normal range of motion and neck supple.      Comments: Right ankle splinted in cast   Skin:     General: Skin is warm.   Neurological:      General: No focal deficit present.      Mental Status: She is alert and oriented to person, place, and time. Mental status is at baseline.      Motor: Weakness present.   Psychiatric:         Mood and Affect: Mood normal.         Behavior: Behavior normal.         Thought Content: Thought content normal.                Significant Labs: All pertinent labs within the past 24 hours have been reviewed.  CBC:   Recent Labs   Lab 04/26/24  1213   WBC 9.12   HGB 14.7   HCT 43.0        CMP:   Recent Labs   Lab 04/26/24  1213      K 4.5   CL 99   CO2 21*      BUN 11   CREATININE 0.8   CALCIUM 9.1   PROT 8.1   ALBUMIN 4.0   BILITOT 0.4   ALKPHOS 82   AST 24   ALT 25   ANIONGAP 18*         Significant Imaging: I have reviewed all pertinent imaging results/findings within the past 24 hours.  Assessment/Plan:     * Closed fracture dislocation of right ankle joint, initial encounter  Status post  fall several hours prior to presentation with by malleolar right ankle fracture  Orthopedics consulted  Reduced and splinted in ER  Patient will go to OR for ORIF in a.m..    Urine pregnancy test ordered and is pending      Alcohol abuse    Patient has a history of alcohol use disorder  She reports she has been in rehabs numerous times  Last drink today  We will monitor for alcohol withdrawal    Alcohol withdrawal    Upon presentation patient's alcohol level was 360  2 hours later patient began with tachycardic diaphoresis and anxiety.  She will be treated with IV hydration, thiamine, multivitamin, and benzodiazepines p.r.n.      VTE Risk Mitigation (From admission, onward)           Ordered     enoxaparin injection 40 mg  Daily         04/26/24 1548     IP VTE HIGH RISK PATIENT  Once         04/26/24 1548     Place sequential compression device  Until discontinued         04/26/24 1548                       On 04/26/2024, patient should be placed in hospital observation services under my care.             Em Espinosa MD  Department of Hospital Medicine  [facility]

## 2024-04-27 NOTE — ASSESSMENT & PLAN NOTE
Upon presentation patient's alcohol level was 360  2 hours later patient began with tachycardic diaphoresis and anxiety.  She will be treated with IV hydration, thiamine, multivitamin, and benzodiazepines p.r.n.

## 2024-04-27 NOTE — HPI
Patient is a 34-year-old female with a past medical history of hypertension, alcohol abuse, depression, tachycardia, elevated LFTs who currently is residing in a hotel.  After drinking last night she fell and fractured her ankle.  She presented to the emergency department and pain.  The fracture was reduced and splinted in the emergency department.  Orthopedics was consulted ensure we will require operative management.  Patient to be admitted by hospital medicine.    Patient has a history of significant alcohol abuse.  She continues to drink.  She denies any other illicit drugs at this time.  Upon presentation after drinking immediately prior to calling her alcohol level was 360.  During her stay in the ER she became more tachycardic as well as diaphoretic and was started on benzodiazepines for withdrawal.

## 2024-04-27 NOTE — ASSESSMENT & PLAN NOTE
Patient has a history of alcohol use disorder  She reports she has been in rehabs numerous times  Last drink on day of admission  We will monitor for alcohol withdrawal

## 2024-04-27 NOTE — ASSESSMENT & PLAN NOTE
Status post fall several hours prior to presentation with by malleolar right ankle fracture  Orthopedics consulted  Reduced and splinted in ER  Patient will go to OR for ORIF in a.m..    Urine pregnancy test ordered and is pending

## 2024-04-27 NOTE — PROGRESS NOTES
"  CC:  R ankle/pilon fx      HISTORY       HPI:  Year old female, ground level fall 04/26/2024  Right ankle fracture dislocation   Came to emergency department were closed reduction and splinting were performed by the emergency department.  She was admitted to the hospital for further management.    Lives at home with father   Independent community ambulator   Daily drinker  Smoker   Denies diabetes, history of heart attack, stroke, blood clot, cancer    At time my evaluation patient complained isolated pain in right ankle  Pain 7/10, sharp, stabbing, worse with splint   No numbness no tingling  Denies prior ankle injuries or surgeries    ROS:  Constitutional: Denies fever/chills  Neurological: Denies numbness/tingling (any exceptions noted in orthopaedic exam)   Psychiatric/Behavioral: Denies change in normal mood  Eyes: Denies change in vision  Cardiovascular: Denies chest pain  Respiratory: Denies shortness of breath  Hematologic/Lymphatic: Denies easy bleeding/bruising   Skin: Denies new rash or skin lesions   Gastrointestinal: Denies nausea/vomitting/diarrhea, change in bowel habits, abdominal pain   Allergic/Immunologic: Denies adverse reactions to current medications  Musculoskeletal: see HPI    PAST MEDICAL HISTORY:   Past Medical History:   Diagnosis Date    Alcohol use, unspecified with unspecified alcohol-induced disorder     Anxiety     Hypertension     Miscarriage      PAST SURGICAL HISTORY:   Past Surgical History:   Procedure Laterality Date    BREAST LUMPECTOMY       FAMILY HISTORY:   Family History   Problem Relation Name Age of Onset    No Known Problems Mother      No Known Problems Father       SOCIAL HISTORY:   Social History     Socioeconomic History    Marital status: Single   Tobacco Use    Smoking status: Every Day     Current packs/day: 0.50     Types: Cigarettes    Smokeless tobacco: Never   Substance and Sexual Activity    Alcohol use: Yes     Comment: "I'm an alcoholic"    Drug use: Yes "     Types: Cocaine, Marijuana     Comment: ocassionally     MEDICATIONS:   Current Facility-Administered Medications:     acetaminophen tablet 650 mg, 650 mg, Oral, Q8H PRN, Em Brown MD    acetaminophen tablet 650 mg, 650 mg, Oral, Q4H PRN, Em Brown MD    busPIRone tablet 10 mg, 10 mg, Oral, BID, Em Brown MD, 10 mg at 04/27/24 0846    chlordiazepoxide capsule 25 mg, 25 mg, Oral, QID PRN, Em Brown MD    cloNIDine tablet 0.1 mg, 0.1 mg, Oral, TID, Em Brown MD, 0.1 mg at 04/27/24 0846    dextrose 10% bolus 125 mL 125 mL, 12.5 g, Intravenous, PRN, Em Brown MD    dextrose 10% bolus 250 mL 250 mL, 25 g, Intravenous, PRN, Em Brown MD    diazePAM tablet 10 mg, 10 mg, Oral, Q8H, Em Brown MD, 10 mg at 04/27/24 0508    enoxaparin injection 40 mg, 40 mg, Subcutaneous, Daily, Em Brown MD, 40 mg at 04/26/24 1626    folic acid tablet 1 mg, 1 mg, Oral, Daily, Em Brown MD, 1 mg at 04/27/24 0846    glucagon (human recombinant) injection 1 mg, 1 mg, Intramuscular, PRN, Em Brown MD    glucose chewable tablet 16 g, 16 g, Oral, PRN, Em Brown MD    glucose chewable tablet 24 g, 24 g, Oral, PRN, Em Brown MD    melatonin tablet 6 mg, 6 mg, Oral, Nightly PRN, Em Brown MD    morphine injection 4 mg, 4 mg, Intravenous, Q6H PRN, Em Brown MD    naloxone 0.4 mg/mL injection 0.02 mg, 0.02 mg, Intravenous, PRN, Em Brown MD    ondansetron injection 4 mg, 4 mg, Intravenous, Q8H PRN, Em Brown MD    oxyCODONE immediate release tablet 10 mg, 10 mg, Oral, Q6H PRN, Em Brown MD    oxyCODONE immediate release tablet 5 mg, 5 mg, Oral, Q6H PRN, Em Brown MD, 5 mg at 04/27/24 0716    polyethylene glycol packet 17 g, 17 g, Oral, Daily, Em Brown MD, 17 g at 04/26/24 1625    senna-docusate 8.6-50 mg per  "tablet 1 tablet, 1 tablet, Oral, BID, Em Brown MD, 1 tablet at 04/26/24 2109    simethicone chewable tablet 80 mg, 1 tablet, Oral, QID PRN, Em Brown MD    sodium chloride 0.9% 1,000 mL with mvi, (ADULT) no.4 with vit K 3,300 unit- 150 mcg/10 mL 10 mL infusion, , Intravenous, Daily, Em Brown MD, Last Rate: 200 mL/hr at 04/27/24 0940, New Bag at 04/27/24 0940    sodium chloride 0.9% flush 3 mL, 3 mL, Intravenous, Q12H PRN, Em Brown MD    thiamine (B-1) 100 mg in dextrose 5 % (D5W) 100 mL IVPB, 100 mg, Intravenous, Daily, Stopped at 04/27/24 0927 **FOLLOWED BY** [START ON 4/29/2024] thiamine tablet 100 mg, 100 mg, Oral, TID, Em Brown MD    traZODone tablet 50 mg, 50 mg, Oral, QHS, Em Brown MD, 50 mg at 04/26/24 2109    venlafaxine 24 hr capsule 150 mg, 150 mg, Oral, Daily, Em Brown MD, 150 mg at 04/27/24 0846  ALLERGIES: Review of patient's allergies indicates:  No Known Allergies      EXAM      VITAL SIGNS:   BP (!) 164/85 (Patient Position: Lying)   Pulse 110   Temp 97.6 °F (36.4 °C) (Oral)   Resp 18   Ht 5' 2" (1.575 m)   Wt 82.4 kg (181 lb 10.5 oz)   LMP 04/19/2024 (Approximate)   SpO2 99%   Breastfeeding No   BMI 33.23 kg/m²       PE:  General:  no acute distress, appears stated age    Neuro: alert and oriented x3  Psych: normal mood  Head: normocephalic, atraumatic.   Eyes: no scleral icterus  Mouth: moist mucous membranes  Cardiovascular: extremities warm and well perfused  Lungs: breathing comfortably, equal chest rise bilat  Skin: clean, dry, intact (any exceptions noted in below musculoskeletal exam)    Musculoskeletal:  RUE:  No gross deformities, wounds  No crepitus, No TTP  Motor intact deltoid, bicep, tricep, wrist flexion/extension, finger flexion/extension, interossei  Sensation intact axillary, radial, median, ulnar nerves  Palp rad pulse, BCR    LUE:  No gross deformities, wounds  No crepitus, No " TTP  Motor intact deltoid, bicep, tricep, wrist flexion/extension, finger flexion/extension, interossei  Sensation intact axillary, radial, median, ulnar nerves  Palp rad pulse, BCR    RLE:  Short-leg splint in place   No bony tenderness about proximal thigh or hip  Motor intact EHL, FHL.  Sensation intact deep/superficial peroneal, tibial nerves  BCR    LLE:  No gross deformities, wounds  No crepitus, No TTP  Motor intact hip flex, quad, Tib Ant, gastroc, EHL, FHL.  Sensation intact saphenous, sural, deep/superficial peroneal, tibial nerves  Palp DP/PT pulse, BCR          XRAYS:  X-ray right ankle CT scan right ankle demonstrate a trimalleolar ankle fracture dislocation, pilon variant, which large posterior malleolar segment, posterior medial fracture including the posterior aspect of medial malleolus, as well as a high Servin C lateral malleolus fracture, transverse with an additional oblique Servin B type nondisplaced fracture line through the fibula.  (I independently reviewed and interpreted the above imaging)    MEDICAL DECISION MAKING       34-year-old female, daily drinker, smoker, ground level fall 04/26/2024   Right ankle fracture dislocation, pilon variant with a large medial and posterior medial fracture line through the distal tibial articular surface as well as a transverse Servin C fibula fracture with additional oblique nondisplaced Servin B fracture line through the distal fibula.      Counseled patient on diagnosis   Discussed both non operative operative management options   Non operative management in an unstable ankle fracture likely result in suboptimal alignment, worse overall long-term outcome.    Discussed operative intervention in the form of open reduction internal fixation, versus staged external fixator placement if the soft tissue swelling is not amenable for definitive fixation.  Hopefully this would improve alignment, stability, both early and long-term outcome.     The risks, benefits,  and alternatives to surgery were discussed with the patient and/or family.    Specific risks discussed included, but were not limited to:  Ankle pain, stiffness, need for multiple staged surgeries, painful scars, numbness, tingling, instability, ankle arthritis, persistent swelling, damage to nearby structures, including neurovascular structures leading to loss of function or loss of limb, bleeding, need for blood transfusion, pain, stiffness, scarring, numbness, tingling, weakness, compartment syndrome, malunion/nonunion, hardware failure, hardware prominence, infection, need for multiple staged procedures, prolonged antibiotics, iatrogenic fracture, heterotopic ossification, arthritis, a variety of medical complications including but not limited to heart attack, stroke, deep venous thrombosis, pulmonary embolism, prolonged hospitalization, prolonged intubation, and death.   Patient and/or family expressed an understanding and desires to proceed with surgery.   All questions were answered.  No guarantees were implied or stated.  Informed consent was obtained.        =====================  Ivan Mcdonnell MD  Orthopaedic Surgery

## 2024-04-27 NOTE — PLAN OF CARE
Discussed poc with pt, verbalized understanding   CIWA 2  Purposeful rounding every 2hours    VS wnl  Fall precautions in place, remains injury free  Pt denies c/o n/v    Pain being manage with ICE and meds    Accurate I&Os  Bed locked at lowest position  Call light within reach    Chart check complete  Will cont with POC    Problem: Adult Inpatient Plan of Care  Goal: Plan of Care Review  Outcome: Progressing  Goal: Patient-Specific Goal (Individualized)  Outcome: Progressing  Flowsheets (Taken 4/27/2024 0129)  Patient/Family-Specific Goals (Include Timeframe): succesful surgery  Goal: Absence of Hospital-Acquired Illness or Injury  Outcome: Progressing  Goal: Optimal Comfort and Wellbeing  Outcome: Progressing  Goal: Readiness for Transition of Care  Outcome: Progressing     Problem: Pain Acute  Goal: Optimal Pain Control and Function  Outcome: Progressing

## 2024-04-27 NOTE — SUBJECTIVE & OBJECTIVE
Past Medical History:   Diagnosis Date    Alcohol use, unspecified with unspecified alcohol-induced disorder     Anxiety     Hypertension     Miscarriage        Past Surgical History:   Procedure Laterality Date    BREAST LUMPECTOMY         Review of patient's allergies indicates:  No Known Allergies    Current Facility-Administered Medications   Medication Dose Route Frequency Provider Last Rate Last Admin    acetaminophen tablet 650 mg  650 mg Oral Q8H PRN Em Brown MD        acetaminophen tablet 650 mg  650 mg Oral Q4H PRN Em Brown MD        busPIRone tablet 10 mg  10 mg Oral BID Em Brown MD        chlordiazepoxide capsule 25 mg  25 mg Oral QID PRN Em Brown MD        cloNIDine tablet 0.1 mg  0.1 mg Oral TID Em Brown MD        dextrose 10% bolus 125 mL 125 mL  12.5 g Intravenous PRN Em Brown MD        dextrose 10% bolus 250 mL 250 mL  25 g Intravenous PRN Em Brown MD        diazePAM tablet 10 mg  10 mg Oral Q8H Em Brown MD        enoxaparin injection 40 mg  40 mg Subcutaneous Daily Em Brown MD   40 mg at 04/26/24 1626    [START ON 4/27/2024] folic acid tablet 1 mg  1 mg Oral Daily Em Brown MD        glucagon (human recombinant) injection 1 mg  1 mg Intramuscular PRN Em Brown MD        glucose chewable tablet 16 g  16 g Oral PRN Em Brown MD        glucose chewable tablet 24 g  24 g Oral PRN Em Brown MD        melatonin tablet 6 mg  6 mg Oral Nightly PRN Em Brown MD        morphine injection 4 mg  4 mg Intravenous Q6H PRN Em Brown MD        naloxone 0.4 mg/mL injection 0.02 mg  0.02 mg Intravenous PRN Em Brown MD        ondansetron injection 4 mg  4 mg Intravenous Q8H PRN Em Brown MD        oxyCODONE immediate release tablet 10 mg  10 mg Oral Q6H PRN Em Brown MD        oxyCODONE  "immediate release tablet 5 mg  5 mg Oral Q6H PRN Em Brown MD        polyethylene glycol packet 17 g  17 g Oral Daily Em Brown MD   17 g at 04/26/24 1625    senna-docusate 8.6-50 mg per tablet 1 tablet  1 tablet Oral BID Em Brown MD        simethicone chewable tablet 80 mg  1 tablet Oral QID PRN Em Brown MD        sodium chloride 0.9% 1,000 mL with mvi, (ADULT) no.4 with vit K 3,300 unit- 150 mcg/10 mL 10 mL infusion   Intravenous Daily Em Brown  mL/hr at 04/26/24 1705 New Bag at 04/26/24 1705    sodium chloride 0.9% flush 3 mL  3 mL Intravenous Q12H PRN Em Brown MD        [START ON 4/27/2024] thiamine (B-1) 100 mg in dextrose 5 % (D5W) 100 mL IVPB  100 mg Intravenous Daily Em Brown MD        Followed by    [START ON 4/29/2024] thiamine tablet 100 mg  100 mg Oral TID Em Brown MD        traZODone tablet 50 mg  50 mg Oral QHS Em Brown MD        venlafaxine 24 hr capsule 150 mg  150 mg Oral Daily Em Brown MD   150 mg at 04/26/24 1625     Family History       Problem Relation (Age of Onset)    No Known Problems Mother, Father          Tobacco Use    Smoking status: Every Day     Current packs/day: 0.50     Types: Cigarettes    Smokeless tobacco: Never   Substance and Sexual Activity    Alcohol use: Yes     Comment: "I'm an alcoholic"    Drug use: Yes     Types: Cocaine, Marijuana     Comment: ocassionally    Sexual activity: Not on file     Review of Systems   Constitutional:  Positive for diaphoresis. Negative for fatigue and fever.   Respiratory:  Negative for cough and shortness of breath.    Cardiovascular:  Positive for palpitations. Negative for chest pain and leg swelling.   Gastrointestinal:  Negative for nausea and vomiting.   Musculoskeletal:  Positive for arthralgias and joint swelling.   Neurological:  Positive for weakness.   Psychiatric/Behavioral:  The patient is " nervous/anxious.    All other systems reviewed and are negative.    Objective:     Vital Signs (Most Recent):  Temp: 98.9 °F (37.2 °C) (04/26/24 1921)  Pulse: (!) 118 (04/26/24 1921)  Resp: 18 (04/26/24 1921)  BP: 139/83 (04/26/24 1921)  SpO2: 98 % (04/26/24 1921) Vital Signs (24h Range):  Temp:  [98 °F (36.7 °C)-98.9 °F (37.2 °C)] 98.9 °F (37.2 °C)  Pulse:  [] 118  Resp:  [18-20] 18  SpO2:  [94 %-99 %] 98 %  BP: (119-139)/(73-97) 139/83     Weight: 81.6 kg (180 lb)  Body mass index is 32.92 kg/m².     Physical Exam  Vitals reviewed.   Constitutional:       Appearance: Normal appearance. She is ill-appearing and diaphoretic.   HENT:      Head: Normocephalic and atraumatic.      Mouth/Throat:      Mouth: Mucous membranes are moist.      Pharynx: Oropharynx is clear.   Eyes:      Extraocular Movements: Extraocular movements intact.      Conjunctiva/sclera: Conjunctivae normal.   Cardiovascular:      Rate and Rhythm: Regular rhythm. Tachycardia present.      Pulses: Normal pulses.      Heart sounds: Normal heart sounds.   Pulmonary:      Effort: Pulmonary effort is normal.      Breath sounds: Normal breath sounds.   Abdominal:      General: Bowel sounds are normal.      Palpations: Abdomen is soft.   Musculoskeletal:         General: Normal range of motion.      Cervical back: Normal range of motion and neck supple.      Comments: Right ankle splinted in cast   Skin:     General: Skin is warm.   Neurological:      General: No focal deficit present.      Mental Status: She is alert and oriented to person, place, and time. Mental status is at baseline.      Motor: Weakness present.   Psychiatric:         Mood and Affect: Mood normal.         Behavior: Behavior normal.         Thought Content: Thought content normal.                Significant Labs: All pertinent labs within the past 24 hours have been reviewed.  CBC:   Recent Labs   Lab 04/26/24  1213   WBC 9.12   HGB 14.7   HCT 43.0        CMP:   Recent  Labs   Lab 04/26/24  1213      K 4.5   CL 99   CO2 21*      BUN 11   CREATININE 0.8   CALCIUM 9.1   PROT 8.1   ALBUMIN 4.0   BILITOT 0.4   ALKPHOS 82   AST 24   ALT 25   ANIONGAP 18*         Significant Imaging: I have reviewed all pertinent imaging results/findings within the past 24 hours.

## 2024-04-28 VITALS
BODY MASS INDEX: 33.43 KG/M2 | DIASTOLIC BLOOD PRESSURE: 87 MMHG | TEMPERATURE: 98 F | SYSTOLIC BLOOD PRESSURE: 140 MMHG | OXYGEN SATURATION: 100 % | HEIGHT: 62 IN | RESPIRATION RATE: 18 BRPM | HEART RATE: 103 BPM | WEIGHT: 181.69 LBS

## 2024-04-28 LAB
ANION GAP SERPL CALC-SCNC: 10 MMOL/L (ref 8–16)
BASOPHILS # BLD AUTO: 0.03 K/UL (ref 0–0.2)
BASOPHILS NFR BLD: 0.4 % (ref 0–1.9)
BUN SERPL-MCNC: 9 MG/DL (ref 6–20)
CALCIUM SERPL-MCNC: 8.9 MG/DL (ref 8.7–10.5)
CHLORIDE SERPL-SCNC: 100 MMOL/L (ref 95–110)
CO2 SERPL-SCNC: 25 MMOL/L (ref 23–29)
CREAT SERPL-MCNC: 0.8 MG/DL (ref 0.5–1.4)
DIFFERENTIAL METHOD BLD: ABNORMAL
EOSINOPHIL # BLD AUTO: 0 K/UL (ref 0–0.5)
EOSINOPHIL NFR BLD: 0.3 % (ref 0–8)
ERYTHROCYTE [DISTWIDTH] IN BLOOD BY AUTOMATED COUNT: 14.8 % (ref 11.5–14.5)
EST. GFR  (NO RACE VARIABLE): >60 ML/MIN/1.73 M^2
GLUCOSE SERPL-MCNC: 131 MG/DL (ref 70–110)
HCT VFR BLD AUTO: 33.9 % (ref 37–48.5)
HGB BLD-MCNC: 11.3 G/DL (ref 12–16)
IMM GRANULOCYTES # BLD AUTO: 0.02 K/UL (ref 0–0.04)
IMM GRANULOCYTES NFR BLD AUTO: 0.3 % (ref 0–0.5)
LYMPHOCYTES # BLD AUTO: 0.8 K/UL (ref 1–4.8)
LYMPHOCYTES NFR BLD: 10.5 % (ref 18–48)
MCH RBC QN AUTO: 28.4 PG (ref 27–31)
MCHC RBC AUTO-ENTMCNC: 33.3 G/DL (ref 32–36)
MCV RBC AUTO: 85 FL (ref 82–98)
MONOCYTES # BLD AUTO: 0.4 K/UL (ref 0.3–1)
MONOCYTES NFR BLD: 5.7 % (ref 4–15)
NEUTROPHILS # BLD AUTO: 6.1 K/UL (ref 1.8–7.7)
NEUTROPHILS NFR BLD: 82.8 % (ref 38–73)
NRBC BLD-RTO: 0 /100 WBC
OHS QRS DURATION: 78 MS
OHS QTC CALCULATION: 468 MS
PLATELET # BLD AUTO: 236 K/UL (ref 150–450)
PMV BLD AUTO: 10 FL (ref 9.2–12.9)
POTASSIUM SERPL-SCNC: 3.8 MMOL/L (ref 3.5–5.1)
RBC # BLD AUTO: 3.98 M/UL (ref 4–5.4)
SODIUM SERPL-SCNC: 135 MMOL/L (ref 136–145)
WBC # BLD AUTO: 7.36 K/UL (ref 3.9–12.7)

## 2024-04-28 PROCEDURE — 25000003 PHARM REV CODE 250: Performed by: ORTHOPAEDIC SURGERY

## 2024-04-28 PROCEDURE — 96366 THER/PROPH/DIAG IV INF ADDON: CPT

## 2024-04-28 PROCEDURE — 25000003 PHARM REV CODE 250: Performed by: INTERNAL MEDICINE

## 2024-04-28 PROCEDURE — 97530 THERAPEUTIC ACTIVITIES: CPT

## 2024-04-28 PROCEDURE — 96367 TX/PROPH/DG ADDL SEQ IV INF: CPT

## 2024-04-28 PROCEDURE — G0378 HOSPITAL OBSERVATION PER HR: HCPCS

## 2024-04-28 PROCEDURE — 80048 BASIC METABOLIC PNL TOTAL CA: CPT | Performed by: INTERNAL MEDICINE

## 2024-04-28 PROCEDURE — 97166 OT EVAL MOD COMPLEX 45 MIN: CPT

## 2024-04-28 PROCEDURE — 97161 PT EVAL LOW COMPLEX 20 MIN: CPT

## 2024-04-28 PROCEDURE — 63600175 PHARM REV CODE 636 W HCPCS: Performed by: ORTHOPAEDIC SURGERY

## 2024-04-28 PROCEDURE — 63600175 PHARM REV CODE 636 W HCPCS: Performed by: INTERNAL MEDICINE

## 2024-04-28 PROCEDURE — 36415 COLL VENOUS BLD VENIPUNCTURE: CPT | Performed by: INTERNAL MEDICINE

## 2024-04-28 PROCEDURE — 85025 COMPLETE CBC W/AUTO DIFF WBC: CPT | Performed by: INTERNAL MEDICINE

## 2024-04-28 RX ORDER — CHLORDIAZEPOXIDE HYDROCHLORIDE 5 MG/1
CAPSULE, GELATIN COATED ORAL
Qty: 33 CAPSULE | Refills: 0 | Status: SHIPPED | OUTPATIENT
Start: 2024-04-28 | End: 2024-05-10

## 2024-04-28 RX ORDER — FOLIC ACID 1 MG/1
1 TABLET ORAL DAILY
Qty: 30 TABLET | Refills: 0 | Status: SHIPPED | OUTPATIENT
Start: 2024-04-29 | End: 2024-06-10

## 2024-04-28 RX ORDER — LANOLIN ALCOHOL/MO/W.PET/CERES
100 CREAM (GRAM) TOPICAL DAILY
Qty: 30 TABLET | Refills: 0 | Status: SHIPPED | OUTPATIENT
Start: 2024-04-28 | End: 2024-05-28

## 2024-04-28 RX ORDER — OXYCODONE HYDROCHLORIDE 5 MG/1
5 TABLET ORAL EVERY 4 HOURS PRN
Qty: 18 TABLET | Refills: 0 | Status: SHIPPED | OUTPATIENT
Start: 2024-04-28 | End: 2024-05-10

## 2024-04-28 RX ORDER — DIAZEPAM 5 MG/1
5 TABLET ORAL EVERY 8 HOURS
Status: DISCONTINUED | OUTPATIENT
Start: 2024-04-28 | End: 2024-04-28 | Stop reason: HOSPADM

## 2024-04-28 RX ORDER — BUSPIRONE HYDROCHLORIDE 10 MG/1
10 TABLET ORAL 2 TIMES DAILY
Qty: 60 TABLET | Refills: 0 | Status: SHIPPED | OUTPATIENT
Start: 2024-04-28 | End: 2024-04-29 | Stop reason: SDUPTHER

## 2024-04-28 RX ORDER — ASPIRIN 81 MG/1
81 TABLET ORAL 2 TIMES DAILY
Start: 2024-04-28 | End: 2024-06-10

## 2024-04-28 RX ORDER — TRAZODONE HYDROCHLORIDE 50 MG/1
50 TABLET ORAL NIGHTLY
Qty: 30 TABLET | Refills: 0 | Status: SHIPPED | OUTPATIENT
Start: 2024-04-28 | End: 2024-06-10

## 2024-04-28 RX ORDER — KETOROLAC TROMETHAMINE 10 MG/1
10 TABLET, FILM COATED ORAL EVERY 6 HOURS
Qty: 20 TABLET | Refills: 0 | Status: SHIPPED | OUTPATIENT
Start: 2024-04-28 | End: 2024-05-10

## 2024-04-28 RX ORDER — VENLAFAXINE HYDROCHLORIDE 150 MG/1
150 CAPSULE, EXTENDED RELEASE ORAL DAILY
Qty: 30 CAPSULE | Refills: 0 | Status: SHIPPED | OUTPATIENT
Start: 2024-04-28 | End: 2024-04-29 | Stop reason: SDUPTHER

## 2024-04-28 RX ORDER — CLONIDINE HYDROCHLORIDE 0.1 MG/1
0.1 TABLET ORAL 3 TIMES DAILY
Qty: 30 TABLET | Refills: 0 | Status: SHIPPED | OUTPATIENT
Start: 2024-04-28 | End: 2024-05-10

## 2024-04-28 RX ORDER — ACETAMINOPHEN 500 MG
1000 TABLET ORAL EVERY 8 HOURS
Start: 2024-04-28

## 2024-04-28 RX ADMIN — THIAMINE HYDROCHLORIDE 100 MG: 100 INJECTION, SOLUTION INTRAMUSCULAR; INTRAVENOUS at 09:04

## 2024-04-28 RX ADMIN — FOLIC ACID 1 MG: 1 TABLET ORAL at 08:04

## 2024-04-28 RX ADMIN — OXYCODONE 10 MG: 5 TABLET ORAL at 04:04

## 2024-04-28 RX ADMIN — CEFAZOLIN 1 G: 1 INJECTION, POWDER, FOR SOLUTION INTRAMUSCULAR; INTRAVENOUS at 01:04

## 2024-04-28 RX ADMIN — DIAZEPAM 10 MG: 5 TABLET ORAL at 05:04

## 2024-04-28 RX ADMIN — CLONIDINE HYDROCHLORIDE 0.1 MG: 0.1 TABLET ORAL at 08:04

## 2024-04-28 RX ADMIN — VENLAFAXINE HYDROCHLORIDE 150 MG: 150 CAPSULE, EXTENDED RELEASE ORAL at 08:04

## 2024-04-28 RX ADMIN — DIAZEPAM 5 MG: 5 TABLET ORAL at 01:04

## 2024-04-28 RX ADMIN — BUSPIRONE HYDROCHLORIDE 10 MG: 10 TABLET ORAL at 08:04

## 2024-04-28 RX ADMIN — CEFAZOLIN 1 G: 1 INJECTION, POWDER, FOR SOLUTION INTRAMUSCULAR; INTRAVENOUS at 04:04

## 2024-04-28 RX ADMIN — OXYCODONE 10 MG: 5 TABLET ORAL at 12:04

## 2024-04-28 NOTE — PROGRESS NOTES
The mobility limitation cannot be sufficiently resolved by the use of a cane.   Patient's functional mobility deficit can be sufficiently resolved with the use of a rolling walker. Patient's mobility limitation significantly impairs their ability to participate in one of more activities of daily living. The use of a rolling walker will significantly improve the patient's ability to participate in MRADLS and the patient will use it on regular basis in the home.     This patient has a mobility limitation that   Prevents them entirely  from accomplishing MRADL's in customary locations in the home   Places them at reasonable determined heightened risk of mobility or mortality secondary to attempts to perform an MRADL   Prevents them from completing MRADL's in a reasonable time frame

## 2024-04-28 NOTE — DISCHARGE INSTRUCTIONS
Stop alcohol    ASA 81 mg twice a day for 6 weeks for DVT prophylaxis  Nonweightbearing R lower extremity x6 weeks postop.   Keep leg elevated at all times  Ambulate with walker  Calcium, vitamin-D supplements     Follow-up 2 weeks postop for removal of splint, suture removal, placement into a boot     X-rays at subsequent followups:  R ankle     Follow-up postop 2 weeks, 6 weeks, 3 months, 6 months, 1 year    Take Tylenol arthritis 2 tabs q.8 hours for the next 5 days    No driving or decision making while taking narcotics

## 2024-04-28 NOTE — PT/OT/SLP EVAL
Physical Therapy Evaluation    Patient Name:  Esther Romo   MRN:  [id number]    Recommendations:     Discharge Recommendations: Low Intensity Therapy   Discharge Equipment Recommendations: walker, rolling   Barriers to discharge: None    Assessment:     Esther Romo is a 34 y.o. female admitted with a medical diagnosis of Closed fracture dislocation of right ankle joint, initial encounter.  She presents with the following impairments/functional limitations: weakness, impaired endurance, impaired self care skills, impaired functional mobility, gait instability, impaired balance, pain, impaired cardiopulmonary response to activity, decreased lower extremity function, decreased upper extremity function, decreased ROM, orthopedic precautions .    Rehab Prognosis: Good; patient would benefit from acute skilled PT services to address these deficits and reach maximum level of function.    Recent Surgery: Procedure(s) (LRB):  ORIF, ANKLE (Right) 1 Day Post-Op    Plan:     During this hospitalization, patient to be seen 3 x/week to address the identified rehab impairments via gait training, therapeutic activities, therapeutic exercises and progress toward the following goals:    Plan of Care Expires:  05/12/24    Subjective     Chief Complaint: PAIN R LE   Patient/Family Comments/goals: DEC PAIN   Pain/Comfort:  Pain Rating 1: 4/10  Location - Side 1: Right  Location 1: ankle  Pain Rating Post-Intervention 1: 4/10    Patients cultural, spiritual, Yazidism conflicts given the current situation:      Living Environment:   PT LIVES AT HOME WITH PARENTS IN A ONE STORY HOME WITH NO STEPS TO ENTER   Prior to admission, patients level of function was IND, DRIVES AND WORKS .  Equipment used at home: none.  DME owned (not currently used): none.  Upon discharge, patient will have assistance from FAMILY .    Objective:     Communicated with  NURSE LAQUENCIA AND EPIC CHART REVIEW  prior to session.  Patient found supine  "with peripheral IV  upon PT entry to room.    General Precautions: Standard, fall  Orthopedic Precautions:RLE non weight bearing   Braces:  (SPLINT AND ACE WRAP)  Respiratory Status: Room air    Exams:  RLE ROM: LIMITED  RLE Strength: NA D/T PAIN   LLE ROM: WNL  LLE Strength: WNL    Functional Mobility:  Bed Mobility:     Rolling Left:  supervision  Supine to Sit: stand by assistance  Transfers:     Sit to Stand:  contact guard assistance with rolling walker  Bed to Chair: contact guard assistance with  rolling walker  using  Stand Pivot  Gait: PT GT TRAINED 2X30' WITH RW AND CGA>SBA      AM-PAC 6 CLICK MOBILITY  Total Score:19       Treatment & Education:  P.T. EDUCATED PT ON NWB R LE AND USE OF AXILLARY CRUTCHES. PT GT TRAINED X 5' WITH AND MIN A WITH DEC STABILITY. PT WITH DEC SAFETY WITH CRUTCHES AND RW GIVEN FOR MOBILITY. PT GT TRAINED AND RETURNED TO  WITH INC FATIGUE. PT T/F ON AND OFF COMMODE WITH RW AND SBA. PT T/F TO CHAIR WITH RW AND SBA. PT EDUCATED ON ROLE OF P.T. AND REC FOR WALKER. PT R LE ELEVATED AND PT LEFT SET UP WITH BREAKFAST AND ALL NEEDS MET. PT EDUCATED ON "CALL DON'T FALL", ENCOURAGED TO CALL FOR ASSISTANCE WITH ALL NEEDS FOR OOB MOBILITY.      Patient left up in chair with call button in reach.    GOALS:   Multidisciplinary Problems       Physical Therapy Goals          Problem: Physical Therapy    Goal Priority Disciplines Outcome Goal Variances Interventions   Physical Therapy Goal     PT, PT/OT      Description: LT24  1. PT WILL COMPLETE BED MOBILITY MOD I  2. PT WILL STAND T/F TO CHAIR WITH RW MOD I NWB R LE  3. PT WILL GT TRAIN NWB R LE X 100' TO PROGRESS GT HUMBLE  4. PT WILL INC AMPAC SCORE BY 2 POINTS TO PROGRESS GROSS FUNC MOBILITY.                          History:     Past Medical History:   Diagnosis Date    Alcohol use, unspecified with unspecified alcohol-induced disorder     Anxiety     Hypertension     Miscarriage        Past Surgical History:   Procedure Laterality " Date    BREAST LUMPECTOMY         Time Tracking:     PT Received On: 04/28/24  PT Start Time: 0745     PT Stop Time: 0810  PT Total Time (min): 25 min     Billable Minutes: Evaluation 15 and Therapeutic Activity 10      04/28/2024

## 2024-04-28 NOTE — PLAN OF CARE
[facility] - Med Surg  Discharge Final Note    Primary Care Provider: No, Primary Doctor    Expected Discharge Date: 4/28/2024    Final Discharge Note (most recent)       Final Note - 04/28/24 1335          Final Note    Assessment Type Final Discharge Note     Anticipated Discharge Disposition Home or Self Care        Post-Acute Status    Post-Acute Authorization HME     HME Status Set-up Complete/Auth obtained     Discharge Delays None known at this time                     Important Message from Medicare             Contact Info       Norma Shine MD   Specialty: Family Medicine    [id number] [place] [address]   Phone: [phone]       Next Steps: Follow up in 3 day(s)    [facility]    [address] [place] [address] [address] [place] [place] [id number]   Phone: [phone]       Next Steps: Follow up in 2 week(s)          Discharge home, no home health or dme orders noted.

## 2024-04-28 NOTE — PT/OT/SLP EVAL
Occupational Therapy   Evaluation    Name: Esther Romo  MRN: [id number]  Admitting Diagnosis: Closed fracture dislocation of right ankle joint, initial encounter  Recent Surgery: Procedure(s) (LRB):  ORIF, ANKLE (Right) 1 Day Post-Op    Recommendations:     Discharge Recommendations: Low Intensity Therapy  Discharge Equipment Recommendations:  walker, rolling, bedside commode, shower chair  Barriers to discharge:  None    Assessment:     Esther Romo is a 34 y.o. female with a medical diagnosis of Closed fracture dislocation of right ankle joint, initial encounter.  She presents with the following performance deficits affecting function: weakness, impaired endurance, impaired self care skills, impaired functional mobility, gait instability, impaired balance, decreased coordination, decreased lower extremity function, decreased safety awareness, pain, impaired cardiopulmonary response to activity, orthopedic precautions.      Rehab Prognosis: Good; patient would benefit from acute skilled OT services to address these deficits and reach maximum level of function.       Plan:     Patient to be seen 2 x/week to address the above listed problems via self-care/home management, therapeutic activities, therapeutic exercises  Plan of Care Expires: 05/12/24  Plan of Care Reviewed with: patient    Subjective     Chief Complaint: R ankle pain  Patient/Family Comments/goals: return to PLOF, improve mobility    Occupational Profile:  Living Environment: lives with parents in a 1 story house with no steps to enter.   Previous level of function: Pt (I) with ADLs and functional mobility.   Roles and Routines: drives and works   Equipment Used at Home: none  Assistance upon Discharge: pt reports dad is retired and able to assist at home    Pain/Comfort:  Pain Rating 1: 4/10  Location - Side 1: Right  Location - Orientation 1: generalized  Location 1: ankle  Pain Addressed 1: Reposition, Distraction  Pain Rating  Post-Intervention 1: 4/10    Objective:     Communicated with: Nurse and epic chart review prior to session.  Patient found HOB elevated with peripheral IV upon OT entry to room.    General Precautions: Standard, fall  Orthopedic Precautions: RLE non weight bearing  Braces: N/A  Respiratory Status: Room air    Occupational Performance:    Bed Mobility:    Patient completed Rolling/Turning to Left with  stand by assistance  Patient completed Scooting/Bridging with stand by assistance  Patient completed Supine to Sit with stand by assistance    Functional Mobility/Transfers:  Patient completed Sit <> Stand Transfer with contact guard assistance  with  axillary crutches from EOB.  Pt completed Sit<>Stand t/f with CGA and RW from toilet.  Patient completed Bed <> Chair Transfer using Stand Pivot technique with contact guard assistance with rolling walker  Patient completed Toilet Transfer Stand Pivot technique with contact guard assistance with  rolling walker  Functional Mobility: Patient completed x30ft x2 functional mobility with CGA-SBA and RW to increase dynamic standing balance and activity tolerance needed for ADL completion. Pt fatiguing quickly.    Activities of Daily Living:  Upper Body Dressing: supervision leatha gown around back  Lower Body Dressing: minimum assistance leatha underwear in supine  Toileting: contact guard assistance using bathroom commode    Cognitive/Visual Perceptual:  Cognitive/Psychosocial Skills:     -       Oriented to: Person, Place, Time, and Situation   -       Follows Commands/attention:Follows multistep  commands  -       Communication: clear/fluent  -       Memory: No Deficits noted  -       Safety awareness/insight to disability: impaired     Physical Exam:  Sensation:    -       Intact  Upper Extremity Range of Motion:     -       Right Upper Extremity: WNL  -       Left Upper Extremity: WNL  Upper Extremity Strength:    -       Right Upper Extremity: WNL  -       Left Upper  Extremity: WNL   Strength:    -       Right Upper Extremity: WNL  -       Left Upper Extremity: WNL    AMPAC 6 Click ADL:  AMPAC Total Score: 22    Treatment & Education:  Educated pt on RLE NWB precautions, functional implications, and importance of compliance. Patient educated on role of OT in acute setting and benefits of participation. Educated on techniques to use to increase independence and decrease fall risk with functional transfers. Educated on importance of OOB activity and calling for A to transfer back to bed. Encouraged completion of B UE AROM therex throughout the day to tolerance to increase functional strength and activity tolerance. Educated patient on importance of increased tolerance to upright position and direct impact on CV endurance and strength. Patient encouraged to sit up in chair for a minimum of 2 consecutive hours per day. Patient stated understanding and in agreement with POC.     Patient left up in chair with all lines intact and call button in reach    GOALS:   Multidisciplinary Problems       Occupational Therapy Goals          Problem: Occupational Therapy    Goal Priority Disciplines Outcome Interventions   Occupational Therapy Goal     OT, PT/OT     Description: Goals to be met by: 5/12/24     Patient will increase functional independence with ADLs by performing:    LE Dressing with Set-up Assistance.  Toileting from toilet with Modified Ida for hygiene and clothing management.   Toilet transfer to toilet with Modified Ida with RW.                         History:     Past Medical History:   Diagnosis Date    Alcohol use, unspecified with unspecified alcohol-induced disorder     Anxiety     Hypertension     Miscarriage          Past Surgical History:   Procedure Laterality Date    BREAST LUMPECTOMY         Time Tracking:     OT Date of Treatment: 04/28/24  OT Start Time: 0745  OT Stop Time: 0810  OT Total Time (min): 25 min    Billable Minutes:Evaluation  15  Therapeutic Activity 10    4/28/2024  Sandra Marmolejo, OT

## 2024-04-28 NOTE — PROGRESS NOTES
34-year-old female  Postop day 0 ORIF right ankle/pilon   Doing well  Pain well controlled     Continue current plan  PT   Appreciate hospitalist comanagement

## 2024-04-28 NOTE — PLAN OF CARE
Pt remains free of injury  Tolerated PT well  Pain managed  Plan to go home with walker  Pt understands POC, adequate for DC

## 2024-04-28 NOTE — PLAN OF CARE
OT hilary completed. Recommends low intensity therapy.  SBA for bed mobility. CGA for t/fs with RW. CGA-SBA for ambulation with RW.

## 2024-04-28 NOTE — PLAN OF CARE
04/28/24 0845   Post-Acute Status   Post-Acute Authorization E   HME Status Referrals Sent     Request for RW submitted to [facility].

## 2024-04-28 NOTE — ANESTHESIA POSTPROCEDURE EVALUATION
Anesthesia Post Evaluation    Patient: Esther Romo    Procedure(s) Performed: Procedure(s) (LRB):  ORIF, ANKLE (Right)    Final Anesthesia Type: general      Patient location during evaluation: PACU  Patient participation: Yes- Able to Participate  Level of consciousness: awake and alert  Post-procedure vital signs: reviewed and stable  Pain management: adequate  Airway patency: patent  JAREN mitigation strategies: Multimodal analgesia  PONV status at discharge: No PONV  Anesthetic complications: no      Cardiovascular status: blood pressure returned to baseline  Respiratory status: unassisted and spontaneous ventilation  Hydration status: euvolemic  Follow-up not needed.              Vitals Value Taken Time   /87 04/28/24 1209   Temp 36.9 °C (98.4 °F) 04/28/24 1209   Pulse 103 04/28/24 1209   Resp 18 04/28/24 1209   SpO2 100 % 04/28/24 1209         Event Time   Out of Recovery 04/27/2024 16:48:04         Pain/Karissa Score: Pain Rating Prior to Med Admin: 7 (4/28/2024 12:03 PM)  Pain Rating Post Med Admin: 4 (4/28/2024  1:03 PM)  Karissa Score: 10 (4/27/2024  4:30 PM)

## 2024-04-29 ENCOUNTER — OFFICE VISIT (OUTPATIENT)
Dept: FAMILY MEDICINE | Facility: CLINIC | Age: 34
End: 2024-04-29
Payer: COMMERCIAL

## 2024-04-29 ENCOUNTER — PATIENT MESSAGE (OUTPATIENT)
Dept: ORTHOPEDICS | Facility: CLINIC | Age: 34
End: 2024-04-29
Payer: COMMERCIAL

## 2024-04-29 DIAGNOSIS — F43.23 ADJUSTMENT REACTION WITH ANXIETY AND DEPRESSION: ICD-10-CM

## 2024-04-29 DIAGNOSIS — I10 PRIMARY HYPERTENSION: Primary | ICD-10-CM

## 2024-04-29 PROCEDURE — 99214 OFFICE O/P EST MOD 30 MIN: CPT | Mod: 95,,, | Performed by: FAMILY MEDICINE

## 2024-04-29 PROCEDURE — 4010F ACE/ARB THERAPY RXD/TAKEN: CPT | Mod: CPTII,95,, | Performed by: FAMILY MEDICINE

## 2024-04-29 RX ORDER — BUSPIRONE HYDROCHLORIDE 10 MG/1
10 TABLET ORAL 2 TIMES DAILY
Qty: 180 TABLET | Refills: 2 | Status: SHIPPED | OUTPATIENT
Start: 2024-04-29

## 2024-04-29 RX ORDER — HYDROXYZINE HYDROCHLORIDE 25 MG/1
25 TABLET, FILM COATED ORAL 3 TIMES DAILY PRN
Qty: 90 TABLET | Refills: 1 | Status: SHIPPED | OUTPATIENT
Start: 2024-04-29

## 2024-04-29 RX ORDER — VENLAFAXINE HYDROCHLORIDE 150 MG/1
150 CAPSULE, EXTENDED RELEASE ORAL DAILY
Qty: 90 CAPSULE | Refills: 3 | Status: SHIPPED | OUTPATIENT
Start: 2024-04-29

## 2024-04-29 RX ORDER — LISINOPRIL AND HYDROCHLOROTHIAZIDE 12.5; 2 MG/1; MG/1
1 TABLET ORAL EVERY MORNING
Qty: 90 TABLET | Refills: 3 | Status: SHIPPED | OUTPATIENT
Start: 2024-04-29

## 2024-04-29 NOTE — PROGRESS NOTES
"Chief Complaint:    No chief complaint on file.      History of Present Illness:  Patient presents today for med refill,      Recent Open reduction internal fixation right ankle pilon fracture fixation both distal tibia and fibula    She has recently quit smoking.   Is on Lisinopril-HCT for HTN. Checks at home regularly and is stable.    Venlafaxine 150 mg, Atarax 50 mg as needed for anxiety. Trazodone for insomnia    ROS:  Review of Systems   Constitutional:  Positive for activity change. Negative for unexpected weight change.   HENT:  Negative for hearing loss, rhinorrhea and trouble swallowing.    Eyes:  Negative for discharge and visual disturbance.   Respiratory:  Negative for chest tightness and wheezing.    Cardiovascular:  Negative for chest pain and palpitations.   Gastrointestinal:  Negative for blood in stool, constipation, diarrhea and vomiting.   Endocrine: Negative for polydipsia and polyuria.   Genitourinary:  Negative for difficulty urinating, dysuria, hematuria, menstrual problem and urgency.   Musculoskeletal:  Negative for arthralgias, joint swelling and neck pain.   Neurological:  Negative for weakness and headaches.   Psychiatric/Behavioral:  Negative for confusion and dysphoric mood.        Past Medical History:   Diagnosis Date    Alcohol use, unspecified with unspecified alcohol-induced disorder     Anxiety     Hypertension     Miscarriage        Social History:  Social History     Socioeconomic History    Marital status: Single   Tobacco Use    Smoking status: Every Day     Current packs/day: 0.50     Types: Cigarettes    Smokeless tobacco: Never   Substance and Sexual Activity    Alcohol use: Yes     Comment: "I'm an alcoholic"    Drug use: Yes     Types: Cocaine, Marijuana     Comment: ocassionally     Social Determinants of Health     Financial Resource Strain: Medium Risk (4/29/2024)    Overall Financial Resource Strain (CARDIA)     Difficulty of Paying Living Expenses: Somewhat hard " "  Food Insecurity: Food Insecurity Present (4/29/2024)    Hunger Vital Sign     Worried About Running Out of Food in the Last Year: Sometimes true     Ran Out of Food in the Last Year: Never true   Transportation Needs: No Transportation Needs (4/29/2024)    PRAPARE - Transportation     Lack of Transportation (Medical): No     Lack of Transportation (Non-Medical): No   Physical Activity: Insufficiently Active (4/29/2024)    Exercise Vital Sign     Days of Exercise per Week: 1 day     Minutes of Exercise per Session: 30 min   Stress: Stress Concern Present (4/29/2024)    [place] [employer] - Occupational Stress Questionnaire     Feeling of Stress : Rather much   Social Connections: Unknown (4/29/2024)    Social Connection and Isolation Panel [NHANES]     Frequency of Communication with Friends and Family: Twice a week     Frequency of Social Gatherings with Friends and Family: Once a week     Active Member of Clubs or Organizations: Yes     Attends Club or Organization Meetings: More than 4 times per year     Marital Status:    Housing Stability: High Risk (4/29/2024)    Housing Stability Vital Sign     Unable to Pay for Housing in the Last Year: Yes       Family History:   family history includes No Known Problems in Esther's father and mother.    Health Maintenance   Topic Date Due    TETANUS VACCINE  Never done    Hepatitis C Screening  Completed    Lipid Panel  Completed       Physical Exam:     ]    There is no height or weight on file to calculate BMI.    Physical Exam      Diabetes Management Status    Statin: Not taking  ACE/ARB: Not taking    Screening or Prevention Patient's value Goal Complete/Controlled?   HgA1C Testing and Control   Lab Results   Component Value Date    HGBA1C 5.3 06/11/2020      Annually/Less than 8% No   Lipid profile : 02/09/2022 Annually No   LDL control No results found for: "LDLCALC" Annually/Less than 100 mg/dl  No   Nephropathy screening No results " "found for: "LABMICR"  Lab Results   Component Value Date    PROTEINUA 1+ (A) 04/26/2024    Annually No   Blood pressure BP Readings from Last 1 Encounters:   04/28/24 (!) 140/87    Less than 140/90 Yes   Dilated retinal exam Most Recent Eye Exam Date: Not Found Annually Yes   Foot exam   Most Recent Foot Exam Date: Not Found Annually Yes       Assessment:      ICD-10-CM ICD-9-CM   1. Primary hypertension  I10 401.9   2. Adjustment reaction with anxiety and depression  F43.23 309.28           Plan:    Decreased Atarax to 25 mg, take as needed for anxiety. Try to avoid taking everyday.   Other medical problems stable, continue current meds.   Medications refilled today.       The patient location is: Home   The chief complaint leading to consultation is: as below  Visit type: Virtual visit with synchronous audio and video  Total time spent with patient: 15+ mins  Each patient to whom he or she provides medical services by telemedicine is:  (1) informed of the relationship between the physician and patient and the respective role of any other health care provider with respect to management of the patient; and (2) notified that he or she may decline to receive medical services by telemedicine and may withdraw from such care at any time.    Notes: see below    No orders of the defined types were placed in this encounter.      Current Outpatient Medications   Medication Sig Dispense Refill    acetaminophen (TYLENOL) 500 MG tablet Take 2 tablets (1,000 mg total) by mouth every 8 (eight) hours.      aspirin (ECOTRIN) 81 MG EC tablet Take 1 tablet (81 mg total) by mouth 2 (two) times a day.      busPIRone (BUSPAR) 10 MG tablet Take 1 tablet (10 mg total) by mouth 2 (two) times daily. 180 tablet 2    chlordiazepoxide (LIBRIUM) 5 MG capsule Take 2 capsules (10 mg total) by mouth 3 (three) times daily for 3 days, THEN 1 capsule (5 mg total) 3 (three) times daily for 3 days, THEN 1 capsule (5 mg total) 2 (two) times a day for 3 " days. 33 capsule 0    cloNIDine (CATAPRES) 0.1 MG tablet Take 1 tablet (0.1 mg total) by mouth 3 (three) times daily. for 10 days 30 tablet 0    folic acid (FOLVITE) 1 MG tablet Take 1 tablet (1 mg total) by mouth once daily. 30 tablet 0    hydrOXYzine (ATARAX) 25 MG tablet Take 1 tablet (25 mg total) by mouth 3 (three) times daily as needed for Itching. 90 tablet 1    ketorolac (TORADOL) 10 mg tablet Take 1 tablet (10 mg total) by mouth every 6 (six) hours. 20 tablet 0    lisinopriL-hydrochlorothiazide (PRINZIDE,ZESTORETIC) 20-12.5 mg per tablet Take 1 tablet by mouth every morning. 90 tablet 3    multivit-minerals/folic acid (WOMEN'S MULTIVITAMIN GUMMIES ORAL) Take by mouth once daily.      oxyCODONE (ROXICODONE) 5 MG immediate release tablet Take 1 tablet (5 mg total) by mouth every 4 (four) hours as needed for Pain. 18 tablet 0    thiamine 100 MG tablet Take 1 tablet (100 mg total) by mouth once daily. 30 tablet 0    traZODone (DESYREL) 50 MG tablet Take 1 tablet (50 mg total) by mouth every evening. 30 tablet 0    venlafaxine (EFFEXOR-XR) 150 MG Cp24 Take 1 capsule (150 mg total) by mouth once daily. 90 capsule 3     No current facility-administered medications for this visit.       Medications Discontinued During This Encounter   Medication Reason    lisinopriL-hydrochlorothiazide (PRINZIDE,ZESTORETIC) 20-12.5 mg per tablet Reorder    venlafaxine (EFFEXOR-XR) 150 MG Cp24 Reorder    busPIRone (BUSPAR) 10 MG tablet Reorder       No follow-ups on file.      Norma Shine MD    Scribe Attestation:   I, Speedy Fleming, am scribing for, and in the presence of, Dr.Arif Shine I performed the above scribed service and the documentation accurately describes the services I performed. I attest to the accuracy of the note.    I, Dr. Norma Shine, reviewed documentation as scribed above. I performed the services described in this documentation.  I agree that the record reflects my personal performance and is accurate and  complete. Norma Shine MD.  04/29/2024

## 2024-04-30 ENCOUNTER — PATIENT OUTREACH (OUTPATIENT)
Dept: ADMINISTRATIVE | Facility: CLINIC | Age: 34
End: 2024-04-30
Payer: COMMERCIAL

## 2024-04-30 NOTE — PROGRESS NOTES
Taking Calcium/ Vit D     C3 nurse spoke with Esther Romo  for a [facility] post hospital discharge follow up call. The patient has a scheduled HOSFU appointment with Norma Shine MD on 04/29/2024 @ 1300.    Message sent to PCP staff

## 2024-05-03 ENCOUNTER — OFFICE VISIT (OUTPATIENT)
Dept: ORTHOPEDICS | Facility: CLINIC | Age: 34
End: 2024-05-03
Payer: MEDICAID

## 2024-05-03 VITALS
TEMPERATURE: 97 F | SYSTOLIC BLOOD PRESSURE: 123 MMHG | BODY MASS INDEX: 33.43 KG/M2 | HEIGHT: 62 IN | DIASTOLIC BLOOD PRESSURE: 83 MMHG | WEIGHT: 181.69 LBS | HEART RATE: 91 BPM

## 2024-05-03 DIAGNOSIS — M25.571 RIGHT ANKLE PAIN, UNSPECIFIED CHRONICITY: Primary | ICD-10-CM

## 2024-05-03 DIAGNOSIS — S82.891D: Primary | ICD-10-CM

## 2024-05-03 PROCEDURE — 4010F ACE/ARB THERAPY RXD/TAKEN: CPT | Mod: CPTII,,, | Performed by: PHYSICIAN ASSISTANT

## 2024-05-03 PROCEDURE — 3079F DIAST BP 80-89 MM HG: CPT | Mod: CPTII,,, | Performed by: PHYSICIAN ASSISTANT

## 2024-05-03 PROCEDURE — 99214 OFFICE O/P EST MOD 30 MIN: CPT | Mod: PBBFAC | Performed by: PHYSICIAN ASSISTANT

## 2024-05-03 PROCEDURE — 99999 PR PBB SHADOW E&M-EST. PATIENT-LVL IV: CPT | Mod: PBBFAC,,, | Performed by: PHYSICIAN ASSISTANT

## 2024-05-03 PROCEDURE — 99024 POSTOP FOLLOW-UP VISIT: CPT | Mod: ,,, | Performed by: PHYSICIAN ASSISTANT

## 2024-05-03 PROCEDURE — 3074F SYST BP LT 130 MM HG: CPT | Mod: CPTII,,, | Performed by: PHYSICIAN ASSISTANT

## 2024-05-03 PROCEDURE — 1160F RVW MEDS BY RX/DR IN RCRD: CPT | Mod: CPTII,,, | Performed by: PHYSICIAN ASSISTANT

## 2024-05-03 PROCEDURE — 1159F MED LIST DOCD IN RCRD: CPT | Mod: CPTII,,, | Performed by: PHYSICIAN ASSISTANT

## 2024-05-03 NOTE — PROGRESS NOTES
Subjective:      Patient ID: Esther Romo is a 34 y.o. adult.    Chief Complaint: Pain and Swelling of the Right Ankle    HPI: Esther Romo is a 34 y.o. adult in clinic today for postoperative follow-up.  Patient is 6 days status post ORIF of right ankle pilon fracture with fixation of both distal tibia and fibula.  Patient is doing well at this time.  She arrives to clinic today in the splint that was placed in the operating room.  No new complaints at this time.  She denies numbness or tingling in the extremity.    Past Medical History:   Diagnosis Date    Alcohol use, unspecified with unspecified alcohol-induced disorder     Anxiety     Hypertension     Miscarriage        Current Outpatient Medications:     acetaminophen (TYLENOL) 500 MG tablet, Take 2 tablets (1,000 mg total) by mouth every 8 (eight) hours., Disp: , Rfl:     aspirin (ECOTRIN) 81 MG EC tablet, Take 1 tablet (81 mg total) by mouth 2 (two) times a day., Disp: , Rfl:     busPIRone (BUSPAR) 10 MG tablet, Take 1 tablet (10 mg total) by mouth 2 (two) times daily., Disp: 180 tablet, Rfl: 2    chlordiazepoxide (LIBRIUM) 5 MG capsule, Take 2 capsules (10 mg total) by mouth 3 (three) times daily for 3 days, THEN 1 capsule (5 mg total) 3 (three) times daily for 3 days, THEN 1 capsule (5 mg total) 2 (two) times a day for 3 days., Disp: 33 capsule, Rfl: 0    cloNIDine (CATAPRES) 0.1 MG tablet, Take 1 tablet (0.1 mg total) by mouth 3 (three) times daily. for 10 days, Disp: 30 tablet, Rfl: 0    folic acid (FOLVITE) 1 MG tablet, Take 1 tablet (1 mg total) by mouth once daily., Disp: 30 tablet, Rfl: 0    hydrOXYzine (ATARAX) 25 MG tablet, Take 1 tablet (25 mg total) by mouth 3 (three) times daily as needed for Itching., Disp: 90 tablet, Rfl: 1    ketorolac (TORADOL) 10 mg tablet, Take 1 tablet (10 mg total) by mouth every 6 (six) hours., Disp: 20 tablet, Rfl: 0    lisinopriL-hydrochlorothiazide (PRINZIDE,ZESTORETIC) 20-12.5 mg per tablet, Take 1 tablet by  "mouth every morning., Disp: 90 tablet, Rfl: 3    multivit-minerals/folic acid (WOMEN'S MULTIVITAMIN GUMMIES ORAL), Take by mouth once daily., Disp: , Rfl:     oxyCODONE (ROXICODONE) 5 MG immediate release tablet, Take 1 tablet (5 mg total) by mouth every 4 (four) hours as needed for Pain., Disp: 18 tablet, Rfl: 0    thiamine 100 MG tablet, Take 1 tablet (100 mg total) by mouth once daily., Disp: 30 tablet, Rfl: 0    traZODone (DESYREL) 50 MG tablet, Take 1 tablet (50 mg total) by mouth every evening., Disp: 30 tablet, Rfl: 0    venlafaxine (EFFEXOR-XR) 150 MG Cp24, Take 1 capsule (150 mg total) by mouth once daily., Disp: 90 capsule, Rfl: 3  Review of patient's allergies indicates:  No Known Allergies    /83 (BP Location: Right arm, Patient Position: Sitting, BP Method: Medium (Automatic))   Pulse 91   Temp 96.8 °F (36 °C) (Oral)   Ht 5' 2" (1.575 m)   Wt 82.4 kg (181 lb 10.5 oz)   LMP 04/19/2024 (Approximate)   BMI 33.23 kg/m²     ROS      Objective:    Ortho Exam   Right ankle:   Splint was removed for physical exam   Sutures intact, incision well approximated, no signs of infection  Moderate edema   Severe TTP diffusely   ROM not tested   Calf and compartments are soft and compressible  Motor exam normal   Sensation and pulses intact  Cap refill brisk    GEN: Well developed, well nourished adult. AAOX3. No acute distress.   Head: Normocephalic, atraumatic.   Eyes: CLEMENTE  Neck: Trachea is midline, no adenopathy  Resp: Breathing unlabored.  Neuro: Motor function normal, Cranial nerves intact  Psych: Mood and affect appropriate.       Assessment:     Imaging:  No new imaging ordered today.        1. Closed fracture dislocation of right ankle joint, with routine healing, subsequent encounter          Plan:       Wounds were dressed and patient was placed back into the posterior splint.  She should continue to wear this at all times and be nonweightbearing.  We will see her back in clinic in about 2 weeks " for suture removal.       Follow up in about 2 weeks (around 5/17/2024).          Patient note was created using MModal Dictation.  Any errors in syntax or even information may not have been identified and edited on initial review prior to signing this note.

## 2024-05-10 ENCOUNTER — HOSPITAL ENCOUNTER (OUTPATIENT)
Dept: RADIOLOGY | Facility: HOSPITAL | Age: 34
Discharge: HOME | End: 2024-05-10
Attending: PHYSICIAN ASSISTANT
Payer: MEDICAID

## 2024-05-10 ENCOUNTER — OFFICE VISIT (OUTPATIENT)
Dept: ORTHOPEDICS | Facility: CLINIC | Age: 34
End: 2024-05-10
Payer: MEDICAID

## 2024-05-10 VITALS
DIASTOLIC BLOOD PRESSURE: 74 MMHG | SYSTOLIC BLOOD PRESSURE: 114 MMHG | HEIGHT: 62 IN | WEIGHT: 181.69 LBS | HEART RATE: 91 BPM | BODY MASS INDEX: 33.43 KG/M2

## 2024-05-10 DIAGNOSIS — S82.891D: Primary | ICD-10-CM

## 2024-05-10 DIAGNOSIS — M25.571 RIGHT ANKLE PAIN, UNSPECIFIED CHRONICITY: ICD-10-CM

## 2024-05-10 DIAGNOSIS — M25.571 RIGHT ANKLE PAIN, UNSPECIFIED CHRONICITY: Primary | ICD-10-CM

## 2024-05-10 PROCEDURE — 3074F SYST BP LT 130 MM HG: CPT | Mod: CPTII,,, | Performed by: PHYSICIAN ASSISTANT

## 2024-05-10 PROCEDURE — 4010F ACE/ARB THERAPY RXD/TAKEN: CPT | Mod: CPTII,,, | Performed by: PHYSICIAN ASSISTANT

## 2024-05-10 PROCEDURE — 99999 PR PBB SHADOW E&M-EST. PATIENT-LVL IV: CPT | Mod: PBBFAC,,, | Performed by: PHYSICIAN ASSISTANT

## 2024-05-10 PROCEDURE — 3078F DIAST BP <80 MM HG: CPT | Mod: CPTII,,, | Performed by: PHYSICIAN ASSISTANT

## 2024-05-10 PROCEDURE — 99024 POSTOP FOLLOW-UP VISIT: CPT | Mod: ,,, | Performed by: PHYSICIAN ASSISTANT

## 2024-05-10 PROCEDURE — 73610 X-RAY EXAM OF ANKLE: CPT | Mod: TC,RT

## 2024-05-10 PROCEDURE — 1160F RVW MEDS BY RX/DR IN RCRD: CPT | Mod: CPTII,,, | Performed by: PHYSICIAN ASSISTANT

## 2024-05-10 PROCEDURE — 1159F MED LIST DOCD IN RCRD: CPT | Mod: CPTII,,, | Performed by: PHYSICIAN ASSISTANT

## 2024-05-10 PROCEDURE — 73610 X-RAY EXAM OF ANKLE: CPT | Mod: 26,RT,, | Performed by: RADIOLOGY

## 2024-05-10 PROCEDURE — 99214 OFFICE O/P EST MOD 30 MIN: CPT | Mod: PBBFAC,25 | Performed by: PHYSICIAN ASSISTANT

## 2024-05-10 NOTE — PROGRESS NOTES
Subjective:      Patient ID: Esther Romo is a 34 y.o. adult.    Chief Complaint: Pain of the Right Ankle    HPI: Esther Romo is a 34 y.o. adult in clinic today for postoperative follow-up.  Patient is 13 days status post ORIF of right ankle pilon fracture with fixation of both distal tibia and fibula.  Patient is doing well this time.  She was last seen in this clinic a week ago and has been in the posterior splint since then.  She has been compliant with nonweightbearing.  No new complaints at this time.    Past Medical History:   Diagnosis Date    Alcohol use, unspecified with unspecified alcohol-induced disorder     Anxiety     Hypertension     Miscarriage        Current Outpatient Medications:     acetaminophen (TYLENOL) 500 MG tablet, Take 2 tablets (1,000 mg total) by mouth every 8 (eight) hours., Disp: , Rfl:     aspirin (ECOTRIN) 81 MG EC tablet, Take 1 tablet (81 mg total) by mouth 2 (two) times a day., Disp: , Rfl:     busPIRone (BUSPAR) 10 MG tablet, Take 1 tablet (10 mg total) by mouth 2 (two) times daily., Disp: 180 tablet, Rfl: 2    folic acid (FOLVITE) 1 MG tablet, Take 1 tablet (1 mg total) by mouth once daily., Disp: 30 tablet, Rfl: 0    hydrOXYzine (ATARAX) 25 MG tablet, Take 1 tablet (25 mg total) by mouth 3 (three) times daily as needed for Itching., Disp: 90 tablet, Rfl: 1    lisinopriL-hydrochlorothiazide (PRINZIDE,ZESTORETIC) 20-12.5 mg per tablet, Take 1 tablet by mouth every morning., Disp: 90 tablet, Rfl: 3    multivit-minerals/folic acid (WOMEN'S MULTIVITAMIN GUMMIES ORAL), Take by mouth once daily., Disp: , Rfl:     thiamine 100 MG tablet, Take 1 tablet (100 mg total) by mouth once daily., Disp: 30 tablet, Rfl: 0    traZODone (DESYREL) 50 MG tablet, Take 1 tablet (50 mg total) by mouth every evening., Disp: 30 tablet, Rfl: 0    venlafaxine (EFFEXOR-XR) 150 MG Cp24, Take 1 capsule (150 mg total) by mouth once daily., Disp: 90 capsule, Rfl: 3  Review of patient's allergies  "indicates:  No Known Allergies    /74 (BP Location: Right arm, Patient Position: Sitting, BP Method: Large (Automatic))   Pulse 91   Ht 5' 2" (1.575 m)   Wt 82.4 kg (181 lb 10.5 oz)   LMP 04/19/2024 (Approximate)   BMI 33.23 kg/m²     ROS      Objective:    Ortho Exam   Right ankle:   Splint was removed for physical exam   Sutures intact, incision well healed, no signs of infection   Moderate edema   Moderate TTP diffusely   ROM not tested   Calf and compartments are soft and compressible   Motor exam normal   Sensation and pulses intact   Cap refill brisk    GEN: Well developed, well nourished adult. AAOX3. No acute distress.   Head: Normocephalic, atraumatic.   Eyes: CLEMENTE  Neck: Trachea is midline, no adenopathy  Resp: Breathing unlabored.  Neuro: Motor function normal, Cranial nerves intact  Psych: Mood and affect appropriate.       Assessment:     Imaging:  X-ray right ankle obtained today shows hardware in satisfactory alignment with no signs of failure.  No significant interval healing noted.  No detrimental changes.        1. Closed fracture dislocation of right ankle joint, with routine healing, subsequent encounter          Plan:       Reviewed the radiographs with the patient.  Sutures removed, patient instructed on normal wound care with soap and water.  Patient was placed into a fracture boot and instructed to remain nonweightbearing at this time.  Boot may review removed for bathing/showering, but worn at all other times.  She should ice and elevate the extremity to help with swelling.  We will see her back in clinic in about 1 month for repeat radiographs and further evaluation.       Follow up in about 1 month (around 6/10/2024).          Patient note was created using MModal Dictation.  Any errors in syntax or even information may not have been identified and edited on initial review prior to signing this note.    "

## 2024-05-20 NOTE — ASSESSMENT & PLAN NOTE
Patient has a history of alcohol use disorder  She reports she has been in rehabs numerous times  Last drink on day of admission  No signs of alcohol withdrawal

## 2024-05-20 NOTE — DISCHARGE SUMMARY
[facility] Medicine  Discharge Summary      Patient Name: Esther Romo  MRN: [id number]  ANGELA: [id number]  Patient Class: OP- Observation  Admission Date: 4/26/2024  Hospital Length of Stay: 0 days  Discharge Date and Time: 4/28/2024  5:13 PM  Attending Physician: No att. providers found   Discharging Provider: Em Espinosa MD  Primary Care Provider: Norma Shine MD    Primary Care Team: Networked reference to record PCT     HPI:   Patient is a 34-year-old female with a past medical history of hypertension, alcohol abuse, depression, tachycardia, elevated LFTs who currently is residing in a hotel.  After drinking last night she fell and fractured her ankle.  She presented to the emergency department and pain.  The fracture was reduced and splinted in the emergency department.  Orthopedics was consulted ensure we will require operative management.  Patient to be admitted by hospital medicine.    Patient has a history of significant alcohol abuse.  She continues to drink.  She denies any other illicit drugs at this time.  Upon presentation after drinking immediately prior to calling her alcohol level was 360.  During her stay in the ER she became more tachycardic as well as diaphoretic and was started on benzodiazepines for withdrawal.      Procedure(s) (LRB):  ORIF, ANKLE (Right)      Hospital Course:   Pt is 35yo with a hx of HTN, etoh abuse, who fell and broke her ankle.   Pt under wentr ORIF of right pilon/trimal ankle fracture.  Follow up plan per ortho:  Antibiotics x 24 hr  ASA 81 mg b.i.d. x6 weeks for DVT prophylaxis  Nonweightbearing R lower extremity x6 weeks postop.     Calcium, vitamin-D        X-rays at subsequent followups:  R ankle (starting at 2-3 wks postop)     Follow-up postop   1 week - remove splint, dressing change, either short leg cast or plaster splint w/ posterior slab and stirrups to obtain neutral dorsiflexion  2-3 weeks (boot, suture removal, XR)  6 weeks, 3  months, 6 months, 1 year    Pt was seen by PT and instructed in use of rolling walker and this  was provided.  Pt seen and examined on day of discharge and stable for dc.      Goals of Care Treatment Preferences:  Code Status: Full Code      Consults:   Consults (From admission, onward)          Status Ordering Provider     Inpatient consult to Orthopedic Surgery  Once        Provider:  Donell Cruz PA-C    Completed NICOLE MORROW            Other  * Closed fracture dislocation of right ankle joint, initial encounter  Status post fall several hours prior to presentation with by malleolar right ankle fracture  Orthopedics consulted  Reduced and splinted in ER  Patient will go to OR for ORIF in a.m..    Per ortho  4.27.24 - ORIF R Pilon/trimal ankle fracture  Antibiotics x 24 hr  ASA 81 mg b.i.d. x6 weeks for DVT prophylaxis  Nonweightbearing R lower extremity x6 weeks postop.   Calcium, vitamin-D     Follow-up 2 weeks postop for removal of splint, suture removal, placement into a boot     X-rays at subsequent followups:  R ankle     Follow-up postop 2 weeks, 6 weeks, 3 months, 6 months, 1 year    Alcohol abuse    Patient has a history of alcohol use disorder  She reports she has been in rehabs numerous times  Last drink on day of admission  No signs of alcohol withdrawal    Alcohol withdrawal    Upon presentation patient's alcohol level was 360  2 hours later patient began with tachycardic diaphoresis and anxiety.  She will be treated with IV hydration, thiamine, multivitamin, and benzodiazepines p.r.n.      Final Active Diagnoses:    Diagnosis Date Noted POA    PRINCIPAL PROBLEM:  Closed fracture dislocation of right ankle joint, initial encounter [S82.891A] 04/26/2024 Yes    Alcohol abuse [F10.10] 07/09/2019 Yes    Alcohol withdrawal [F10.939] 06/26/2019 Yes      Problems Resolved During this Admission:       Discharged Condition: good    Disposition: Home or Self Care    Follow Up:   Follow-up  "Information       Norma Shine MD Follow up in 3 day(s).    Specialty: Family Medicine  Contact information:  [address]  [facility] [address] [phone]               Clinic, [facility] Follow up in 2 week(s).    Contact information:  [address]  [place] [facility] [address] [phone]                           Patient Instructions:      WALKER FOR HOME USE   Order Comments: The mobility limitation cannot be sufficiently resolved by the use of a cane.   Patient's functional mobility deficit can be sufficiently resolved with the use of a rolling walker. Patient's mobility limitation significantly impairs their ability to participate in one of more activities of daily living. The use of a rolling walker will significantly improve the patient's ability to participate in MRADLS and the patient will use it on regular basis in the home.     This patient has a mobility limitation that   Prevents them entirely  from accomplishing MRADL's in customary locations in the home   Places them at reasonable determined heightened risk of mobility or mortality secondary to attempts to perform an MRADL   Prevents them from completing MRADL's in a reasonable time frame     Order Specific Question Answer Comments   Type of Walker: Adult (5'4"-6'6")    With wheels? Yes    Height: 5' 2" (1.575 m)    Weight: 82.4 kg (181 lb 10.5 oz)    Length of need (1-99 months): 2    Please check all that apply: Patient's condition impairs ambulation.    Please check all that apply: Patient is unable to safely ambulate without equipment.      Diet Cardiac     Notify your health care provider if you experience any of the following:  temperature >100.4     Notify your health care provider if you experience any of the following:  severe uncontrolled pain     Notify your health care provider if you experience any of the following:  redness, tenderness, or signs of infection (pain, swelling, redness, odor or " green/yellow discharge around incision site)     Notify your health care provider if you experience any of the following:  difficulty breathing or increased cough     No dressing needed     Leave dressing on - Keep it clean, dry, and intact until clinic visit     Activity as tolerated   Order Comments: 4.27.24 - ORIF R Pilon/trimal ankle fracture     ASA 81 mg b.i.d. x6 weeks for DVT prophylaxis  Nonweightbearing R lower extremity x6 weeks postop.     Calcium, vitamin-D     Follow-up 2 weeks postop for removal of splint, suture removal, placement into a boot     X-rays at subsequent followups:  R ankle     Follow-up postop 2 weeks, 6 weeks, 3 months, 6 months, 1 year       Significant Diagnostic Studies: Labs: All labs within the past 24 hours have been reviewed    Pending Diagnostic Studies:       None           Medications:  Reconciled Home Medications:      Medication List        START taking these medications      acetaminophen 500 MG tablet  Commonly known as: TYLENOL  Take 2 tablets (1,000 mg total) by mouth every 8 (eight) hours.     aspirin 81 MG EC tablet  Commonly known as: ECOTRIN  Take 1 tablet (81 mg total) by mouth 2 (two) times a day.     folic acid 1 MG tablet  Commonly known as: FOLVITE  Take 1 tablet (1 mg total) by mouth once daily.     thiamine 100 MG tablet  Take 1 tablet (100 mg total) by mouth once daily.            CHANGE how you take these medications      traZODone 50 MG tablet  Commonly known as: DESYREL  Take 1 tablet (50 mg total) by mouth every evening.  What changed:   medication strength  how much to take            CONTINUE taking these medications      WOMEN'S MULTIVITAMIN GUMMIES ORAL  Take by mouth once daily.            STOP taking these medications      busPIRone 10 MG tablet  Commonly known as: BUSPAR     busPIRone 5 MG Tab  Commonly known as: BUSPAR     venlafaxine 150 MG Cp24  Commonly known as: EFFEXOR-XR              Indwelling Lines/Drains at time of discharge:    Lines/Drains/Airways       None                   Time spent on the discharge of patient: 33 minutes         Em Espinosa MD  Department of Hospital Medicine  [facility]

## 2024-05-20 NOTE — HOSPITAL COURSE
Pt is 33yo with a hx of HTN, etoh abuse, who fell and broke her ankle.   Pt under wentr ORIF of right pilon/trimal ankle fracture.  Follow up plan per ortho:  Antibiotics x 24 hr  ASA 81 mg b.i.d. x6 weeks for DVT prophylaxis  Nonweightbearing R lower extremity x6 weeks postop.     Calcium, vitamin-D        X-rays at subsequent followups:  R ankle (starting at 2-3 wks postop)     Follow-up postop   1 week - remove splint, dressing change, either short leg cast or plaster splint w/ posterior slab and stirrups to obtain neutral dorsiflexion  2-3 weeks (boot, suture removal, XR)  6 weeks, 3 months, 6 months, 1 year    Pt was seen by PT and instructed in use of rolling walker and this  was provided.  Pt seen and examined on day of discharge and stable for dc.

## 2024-06-07 ENCOUNTER — PATIENT OUTREACH (OUTPATIENT)
Dept: ADMINISTRATIVE | Facility: HOSPITAL | Age: 34
End: 2024-06-07
Payer: MEDICAID

## 2024-06-10 ENCOUNTER — OFFICE VISIT (OUTPATIENT)
Dept: ORTHOPEDICS | Facility: CLINIC | Age: 34
End: 2024-06-10
Payer: MEDICAID

## 2024-06-10 ENCOUNTER — HOSPITAL ENCOUNTER (OUTPATIENT)
Dept: RADIOLOGY | Facility: HOSPITAL | Age: 34
Discharge: HOME | End: 2024-06-10
Attending: PHYSICIAN ASSISTANT
Payer: MEDICAID

## 2024-06-10 VITALS
TEMPERATURE: 96 F | HEIGHT: 62 IN | BODY MASS INDEX: 33.43 KG/M2 | HEART RATE: 100 BPM | SYSTOLIC BLOOD PRESSURE: 137 MMHG | DIASTOLIC BLOOD PRESSURE: 89 MMHG | WEIGHT: 181.69 LBS

## 2024-06-10 DIAGNOSIS — M25.571 RIGHT ANKLE PAIN, UNSPECIFIED CHRONICITY: Primary | ICD-10-CM

## 2024-06-10 DIAGNOSIS — S82.891D: Primary | ICD-10-CM

## 2024-06-10 DIAGNOSIS — M25.571 RIGHT ANKLE PAIN, UNSPECIFIED CHRONICITY: ICD-10-CM

## 2024-06-10 PROCEDURE — 1160F RVW MEDS BY RX/DR IN RCRD: CPT | Mod: CPTII,,, | Performed by: PHYSICIAN ASSISTANT

## 2024-06-10 PROCEDURE — 3079F DIAST BP 80-89 MM HG: CPT | Mod: CPTII,,, | Performed by: PHYSICIAN ASSISTANT

## 2024-06-10 PROCEDURE — 1159F MED LIST DOCD IN RCRD: CPT | Mod: CPTII,,, | Performed by: PHYSICIAN ASSISTANT

## 2024-06-10 PROCEDURE — 99999 PR PBB SHADOW E&M-EST. PATIENT-LVL IV: CPT | Mod: PBBFAC,,, | Performed by: PHYSICIAN ASSISTANT

## 2024-06-10 PROCEDURE — 73610 X-RAY EXAM OF ANKLE: CPT | Mod: 26,RT,, | Performed by: RADIOLOGY

## 2024-06-10 PROCEDURE — 99214 OFFICE O/P EST MOD 30 MIN: CPT | Mod: PBBFAC,25 | Performed by: PHYSICIAN ASSISTANT

## 2024-06-10 PROCEDURE — 73610 X-RAY EXAM OF ANKLE: CPT | Mod: TC,RT

## 2024-06-10 PROCEDURE — 4010F ACE/ARB THERAPY RXD/TAKEN: CPT | Mod: CPTII,,, | Performed by: PHYSICIAN ASSISTANT

## 2024-06-10 PROCEDURE — 3075F SYST BP GE 130 - 139MM HG: CPT | Mod: CPTII,,, | Performed by: PHYSICIAN ASSISTANT

## 2024-06-10 PROCEDURE — 99024 POSTOP FOLLOW-UP VISIT: CPT | Mod: ,,, | Performed by: PHYSICIAN ASSISTANT

## 2024-06-10 NOTE — PROGRESS NOTES
"Subjective:      Patient ID: Esther Romo is a 34 y.o. adult.    Chief Complaint: Pain of the Right Ankle    HPI: Esther Romo is a 34 y.o. adult in clinic today for postoperative follow-up.  Patient is 6 weeks status post ORIF of right ankle pilon fracture with fixation of both distal tibia and fibula.  She was last seen in this clinic on 05/10/2024.  Since then she has been compliant with fracture boot and nonweightbearing.  She reports that her pain is greatly improved.  No new complaints at this time    Past Medical History:   Diagnosis Date    Alcohol use, unspecified with unspecified alcohol-induced disorder     Anxiety     Hypertension     Miscarriage        Current Outpatient Medications:     acetaminophen (TYLENOL) 500 MG tablet, Take 2 tablets (1,000 mg total) by mouth every 8 (eight) hours., Disp: , Rfl:     aspirin (ECOTRIN) 81 MG EC tablet, Take 1 tablet (81 mg total) by mouth 2 (two) times a day., Disp: , Rfl:     busPIRone (BUSPAR) 10 MG tablet, Take 1 tablet (10 mg total) by mouth 2 (two) times daily., Disp: 180 tablet, Rfl: 2    hydrOXYzine (ATARAX) 25 MG tablet, Take 1 tablet (25 mg total) by mouth 3 (three) times daily as needed for Itching., Disp: 90 tablet, Rfl: 1    lisinopriL-hydrochlorothiazide (PRINZIDE,ZESTORETIC) 20-12.5 mg per tablet, Take 1 tablet by mouth every morning., Disp: 90 tablet, Rfl: 3    multivit-minerals/folic acid (WOMEN'S MULTIVITAMIN GUMMIES ORAL), Take by mouth once daily., Disp: , Rfl:     traZODone (DESYREL) 50 MG tablet, Take 1 tablet (50 mg total) by mouth every evening., Disp: 30 tablet, Rfl: 0    venlafaxine (EFFEXOR-XR) 150 MG Cp24, Take 1 capsule (150 mg total) by mouth once daily., Disp: 90 capsule, Rfl: 3  Review of patient's allergies indicates:  No Known Allergies    /89 (BP Location: Left arm, Patient Position: Sitting, BP Method: Medium (Automatic))   Pulse 100   Temp (!) 95.9 °F (35.5 °C) (Oral)   Ht 5' 2" (1.575 m)   Wt 82.4 kg (181 lb " 10.5 oz)   BMI 33.23 kg/m²     ROS      Objective:    Ortho Exam   Right ankle:   Incisions well healed, no signs of infection   Moderate edema   Mild TTP diffusely   ROM decreased secondary to stiffness   Calf and compartments are soft and compressible   Motor exam normal   Sensation and pulses intact   Cap refill brisk    GEN: Well developed, well nourished adult. AAOX3. No acute distress.   Head: Normocephalic, atraumatic.   Eyes: CLEMENTE  Neck: Trachea is midline, no adenopathy  Resp: Breathing unlabored.  Neuro: Motor function normal, Cranial nerves intact  Psych: Mood and affect appropriate.       Assessment:     Imaging:  X-ray right ankle obtained today shows hardware in satisfactory alignment no signs of failure.  There is interval healing at the fracture site.  No detrimental changes.        1. Closed fracture dislocation of right ankle joint, with routine healing, subsequent encounter          Plan:       Reviewed the radiographs with the patient.  Encouraged her on the progress she has made so far.  At this time she may begin to weightbear as tolerated in the fracture boot.  We discussed starting light and increasing as pain allows.  Fracture boot should be worn while weight-bearing, but may be removed at other times.  She should continue to ice and elevate the extremity to help with edema.  Recommended we start her in physical therapy to help both with the weight-bearing and ROM of her ankle.  We will see her back in clinic in about 6 weeks for repeat radiographs and further evaluation.    Orders Placed This Encounter    Ambulatory referral/consult to Physical/Occupational Therapy     Follow up in about 6 weeks (around 7/22/2024).          Patient note was created using [employer].  Any errors in syntax or even information may not have been identified and edited on initial review prior to signing this note.

## 2024-06-11 ENCOUNTER — CLINICAL SUPPORT (OUTPATIENT)
Dept: REHABILITATION | Facility: HOSPITAL | Age: 34
End: 2024-06-11
Payer: MEDICAID

## 2024-06-11 DIAGNOSIS — R26.9 GAIT ABNORMALITY: ICD-10-CM

## 2024-06-11 DIAGNOSIS — M25.671 DECREASED RANGE OF MOTION OF RIGHT ANKLE: Primary | ICD-10-CM

## 2024-06-11 DIAGNOSIS — S82.891D: ICD-10-CM

## 2024-06-11 PROCEDURE — 97110 THERAPEUTIC EXERCISES: CPT | Mod: PN

## 2024-06-11 PROCEDURE — 97161 PT EVAL LOW COMPLEX 20 MIN: CPT | Mod: PN

## 2024-06-11 NOTE — PROGRESS NOTES
"[facility]   Physical Therapy Initial Evaluation     Date: 6/11/2024   Name: Esther Romo  Clinic Number: [id number]    Therapy Diagnosis:   Encounter Diagnosis   Name Primary?    Closed fracture dislocation of right ankle joint, with routine healing, subsequent encounter      Physician: Donell Cruz PA-C    Physician Orders: {AMB PT KNEE ORDERS:27002}   Medical Diagnosis from Referral: ***  Evaluation Date: 6/11/2024  Authorization Period Expiration: ***  Plan of Care Expiration: ***  Progress Note Due: ***  Visit # / Visits authorized: 1/1 eval  FOTO: 1/3 (last performed 6/11/2024)     PRECAUTIONS: {IP WOUND PRECAUTIONS OHS:34568}      MD Follow up: ***    Time In: ***  Time Out: ***  Total Appointment Time (timed & untimed codes): *** minutes      SUBJECTIVE     Date of onset: ***    History of current condition - Esther reports: ***    Falls: ***    Imaging: [] Xray [] MRI [] CT: Performed on: ***    Prior Therapy:   [] N/A  [] Yes:   Social History: Pt {LIVES WITH:36469} [] House [] Apartment/Condo []other  Stairs: [] No  [] Yes:   Occupation: Patient is Data Unavailable  ***  School/Work Restrictions: [] None [] Yes:   Prior Level of Function: Independent and pain free with all activities of daily living  Current Level of Function: ***% of prior level of function  Functional Deficits (based on missing percentage): ***  Gym/Home Equipment: ***    Pain:  Current {0-10:20507::"0"}/10, worst {0-10:03905::"0"}/10  Location: [] Right   [] Left:  ***  Description: {Pain Description:30265}  Aggravating Factors: {Causes; Pain:67776}  Easing Factors: activity avoidance, rest, {Pain (activities that relieve):76065}    Patients goals: ***     Medical History:   Past Medical History:   Diagnosis Date    Alcohol use, unspecified with unspecified alcohol-induced disorder     Anxiety     Hypertension     Miscarriage        Surgical History:   Esther Romo  has a past surgical " history that includes Breast lumpectomy and Open reduction and internal fixation (ORIF) of injury of ankle (Right, 4/27/2024).    Medications:   Esther has a current medication list which includes the following prescription(s): acetaminophen, aspirin, buspirone, hydroxyzine hcl, lisinopril-hydrochlorothiazide, multivit-minerals/folic acid, trazodone, and venlafaxine.    Allergies:   Review of patient's allergies indicates:  No Known Allergies     OBJECTIVE     OBSERVATION: ***    POSTURE: Pt presents with postural abnormalities which include:    [x] Forward Head   [] Increased Lumbar Lordosis   [x] Rounded Shoulder   [] Genu Recurvatum   [] Increased Thoracic Kyphosis [] Genu Valgus   [] Trunk Deviated    [] Pes Planus   [] Scapular Winging    [] Other:     GAIT: Pt ambulated with [***assistive device if any] [***distance] with a [***type] gait. Observed [***specific characteristics]    SFMA:  Top Tiers Right  (Spine) Left Notes    Cervical Flexion ***     Cervical Extension ***     Cervical side bend  *** ***    Cervical Rotation  *** ***    Shld Medial Rotation Extension  *** ***    Shld Lateral Rotation Flexion  *** ***    Multi-segmental   Flexion ***     Multi-segmental Extension ***     Multi-segmental   side bend  *** ***    Multi-segmental Rotation  *** ***    Single Leg Stance  (10 sec) *** ***    Deep Squat  ***     FN = Functional Normal   FP = Functional Painful  DN = Dysfunctional Normal   DP = Dysfuncional Painful     STRENGTH:   Upper Extremity  Strength RIGHT   LEFT   Shoulder Flexion {MMT Score:99171} {MMT Score:23673}   Shoulder Abduction {MMT Score:16822} {MMT Score:86316}   Shoulder Internal Rotation  {MMT Score:92235} {MMT Score:13055}   Shoulder External Rotation {MMT Score:07289} {MMT Score:48522}   Elbow Flexion  {MMT Score:52109} {MMT Score:64730}   Elbow Extension {MMT Score:91808} {MMT Score:61289}     Lower Extremity  Strength RIGHT   LEFT   Hip Flexion  {MMT Score:29619} {MMT  Score:46383}   Hip abduction  {MMT Score:51347} {MMT Score:46998}   Hip extension {MMT Score:40020} {MMT Score:00384}   Knee Flexion {MMT Score:82532} {MMT Score:22143}   Knee Extension {MMT Score:28706} {MMT Score:70266}   Ankle Dorsiflexion {MMT Score:50539} {MMT Score:88694}     RANGE OF MOTION: (*) pain and/or dysfunction  Shoulder   Range of Motion Right  Active     Passive Left  Active     Passive Notes   Forward Flexion (180º) ***       External Rotation at 90º (90º)        External Rotation at 45º (45º)         Internal Rotation at 90º (90º)        Functional External Rotation (C7)        Functional Internal Rotation (T10)          Elbow AROM/PROM Right Left Notes   Elbow Flexion (150º) *** ***    Elbow Extension (0º) *** ***      Hip AROM/PROM Right Left Notes   Hip Flexion (120º) *** ***    Hip Extension (30º) *** ***    Hip Abduction (45º) *** ***    Hip Internal Rotation  (45º) *** ***    Hip ER (45º) *** ***      Knee AROM/PROM Right Left Notes   Knee Flexion (135º) *** ***    Knee Extension (0º) *** ***      Ankle/Foot AROM/PROM Right Left Notes   Dorsiflexion (20º) *** ***    Plantarflexion (50º) *** ***    Inversion (35º) *** ***    Eversion (15º) *** ***    Great Toe Extension (70º) *** ***      SPECIAL TESTS:   ***    SENSATION:  [x] Intact to Light Touch     [] Impaired:    PALPATION: tenderness to palpation to ***    JOINT MOBILITY: ***    BALANCE:  Stance (30 seconds) Right (spine) Left Notes   Wide Base of Support ***     Narrow Base of Support  (NBOS) ***     Tandem Stance Right leading     *** Left leading    ***    Single Leg Stance *** ***    mCTSIB  NBOS eyes open   NBOS eyes close   NBOS foam EO  NBOS foam EC   ***  ***  ***  ***      FUNCTIONAL TESTS  Tests  Outcome Norms   Timed Up and Go *** <13.5 sec   Five Time Sit to Stand *** 60s: <11.4 sec  70s: <12.6 sec  80s: 14.8 sec   30 second Sit to Stand *** 70s: 10-15f, 11-17m  Low 80s: 9-14f, 8-14m  High 80s: 8-13f; 8-14m  Low 90s: 4-11f;  7-12m   6 minutes walk *** 60s: >538f, 572m  70s: >471f, 527m  80s: >417f, 392m       Intake Outcome Measure for FOTO *** Survey    Therapist reviewed FOTO scores for Esther Membreno Ridout on 6/11/2024.   FOTO documents entered into [employer] - see Media section.    Intake Score: ***%       TREATMENT     Total Treatment time (time-based codes) separate from Evaluation: *** minutes      Esther received the treatments listed below:      CPT Intervention  JointFocus Performed Duration / Intensity  ***                                                                                   PLAN            CPT Codes available for Billing:   (***) minutes of Manual therapy (MT) to improve pain and ROM.  (***) minutes of Therapeutic Exercise (TE) to develop strength, endurance, range of motion, and flexibility.  (***) minutes of Neuromuscular Re-Education (NMR)  to improve: Balance, Coordination, Kinesthetic, Sense, Proprioception, and Posture.  (***) minutes of Therapeutic Activities (TA) to improve functional performance.  Unattended Electrical Stimulation (ES) for muscle performance or pain modulation.  Vasopneumatic Device Therapy () for management of swelling/edema. (46621)  BFR: Blood flow restriction applied during exercise  NP or (-): Not Performed       PATIENT EDUCATION AND HOME EXERCISES     Education provided:  (***) minutes   - HEP provided   - PT role in POC     Written Home Exercises Provided: yes.  Exercises were reviewed and Esther was able to demonstrate them prior to the end of the session.  Esther demonstrated {Desc; good/fair/poor:60917} understanding of the education provided. See EMR under Patient Instructions for exercises provided during therapy sessions.    ASSESSMENT     Esther is a 34 y.o. adult referred to outpatient Physical Therapy with a medical diagnosis of ***. Pt presents with impairments in the following categories: {IMPAIRMENTS:64110}. ***    Patient prognosis is {REHAB PROGNOSIS OHS:96057}.  "  Patient will benefit from skilled outpatient Physical Therapy to address the deficits stated above and in the chart below, provide patient /family education, and to maximize patientt's level of independence.     Plan of care discussed with patient: yes   Patient's spiritual, cultural and educational needs considered and patient is agreeable to the plan of care and goals as stated below:     Anticipated Barriers for therapy: {barriers for care:62382}    Medical Necessity is demonstrated by the following  History  Co-morbidities and personal factors that may impact the plan of care [x] LOW: no personal factors / co-morbidities  [] MODERATE: 1-2 personal factors / co-morbidities  [] HIGH: 3+ personal factors / co-morbidities    Moderate / High Support Documentation:   Past Medical History:   Diagnosis Date    Alcohol use, unspecified with unspecified alcohol-induced disorder     Anxiety     Hypertension     Miscarriage         Examination  Body Structures and Functions, activity limitations and participation restrictions that may impact the plan of care [x] LOW: addressing 1-2 elements  [] MODERATE: 3+ elements  [] HIGH: 4+ elements (please support below)    Moderate / High Support Documentation:   [] Head / Neck  [] Spine  [] Upper Quarter  [] Lower Quarter  [] Range of motion Deficits  [] Gross Symmetry Deficits  [] Strength Deficits  [] Balance Deficits  [] Gait Deficits  [] Unable to participate in daily activities  [] Unable to perform functional tasks  [] Unable to Care for Self or others  [] Community/ Social Life changes due to impairments     Clinical Presentation [x] LOW: stable  [] MODERATE: Evolving  [] HIGH: Unstable     Decision Making/ Complexity Score: {Desc; low/moderate/high:964368}         Short Term Goals:  {numbers 1-12:56729::"6"} weeks Status  Date Met   PAIN: Pt will report worst pain of ***/10 in order to progress toward max functional ability and improve quality of life. [x] Progressing  [] " "Met  [] Not Met    FUNCTION: Patient will improve functional outcome test score by *** points  [x] Progressing  [] Met  [] Not Met    MOBILITY: Patient will improve *** AROM by *** percent in order to progress towards independence with functional activities.  [x] Progressing  [] Met  [] Not Met    STRENGTH: Patient will improve strength by *** grade  in order to progress towards independence with functional activities. [x] Progressing  [] Met  [] Not Met    POSTURE: Patient will correct postural deviations in sitting and standing, to decrease pain and promote long term stability.  [x] Progressing  [] Met  [] Not Met    GAIT: Patient will demonstrate improved gait mechanics including *** in order to improve functional mobility, improve quality of life, and decrease risk of further injury or fall.  [x] Progressing  [] Met  [] Not Met    HEP: Patient will demonstrate independence with HEP in order to progress toward functional independence. [x] Progressing  [] Met  [] Not Met      Long Term Goals:  {numbers 1-12:47520::"12"} weeks Status Date Met   PAIN: Pt will report worst pain of ***/10 in order to progress toward max functional ability and improve quality of life [x] Progressing  [] Met  [] Not Met    FUNCTION: Patient will demonstrate improved function as indicated by an increase of *** points on FOTO [x] Progressing  [] Met  [] Not Met    MOBILITY: Patient will improve *** AROM to *** in order to return to maximal functional potential and improve quality of life.  [x] Progressing  [] Met  [] Not Met    STRENGTH: Patient will improve strength to ***/5 without pain in order to improve functional independence and quality of life. [x] Progressing  [] Met  [] Not Met    GAIT: Patient will demonstrate normalized gait mechanics with minimal compensation in order to return to PLOF. [x] Progressing  [] Met  [] Not Met    Patient will return to normal ADL's, IADL's, community involvement, recreational activities, and " "work-related activities with less than or equal to ***/10 pain and maximal function.  [x] Progressing  [] Met  [] Not Met        PLAN   Plan of care Certification: 6/11/2024 to ***/2024    Outpatient Physical Therapy {NUMBERS 1-5:63343} times weekly for *** weeks to include any combination of the following interventions: virtual visits, dry needling, modalities, electrical stimulation (IFC, Pre-Mod, Attended with Functional Dry Needling), {TX PLAN:51706::"Manual Therapy","Moist Heat/ Ice","Neuromuscular Re-ed","Patient Education","Self Care","Therapeutic Exercise","Functional Training","Therapeutic Activites"}     Thank you for this referral.    Hoa Garnett, PT, DPT    I CERTIFY THE NEED FOR THESE SERVICES FURNISHED UNDER THIS PLAN OF TREATMENT AND WHILE UNDER MY CARE   Physician's comments:     Physician's Signature: ___________________________________________________       "

## 2024-06-11 NOTE — PLAN OF CARE
[facility]   Physical Therapy Initial Evaluation     Date: 6/11/2024   Name: Esther Membreno [facility] Number: [id number]    Therapy Diagnosis:   Encounter Diagnoses   Name Primary?    Closed fracture dislocation of right ankle joint, with routine healing, subsequent encounter     Decreased range of motion of right ankle Yes    Gait abnormality      Physician: Donell Cruz PA-C    Physician Orders: PT Eval and Treat   Medical Diagnosis from Referral: Closed fracture dislocation of right ankle joint, with routine healing, subsequent encounter [S82.891S]   Evaluation Date: 6/11/2024  Authorization Period Expiration: 6/10/25  Plan of Care Expiration: 9/6/24  Progress Note Due: 7/11/24  Visit # / Visits authorized: 1/1 eval  FOTO: 1/3 (last performed 6/11/2024)     PRECAUTIONS: Standard     MD Follow up: 7/22/24    Date of Surgery  4/27/24   Surgery Performed  Open reduction internal fixation right ankle pilon fracture fixation both distal tibia and fibula   Post-Op Precautions WBAT with CAM boot       Time In: 2:30  Time Out: 3:30  Total Appointment Time (timed & untimed codes): 60 minutes      SUBJECTIVE     Date of onset: 4/26/24    History of current condition - Esther reports: She tripped over a flip flop and fractured her right ankle on 4/26/24 resulting in an ER visit where she got surgery for right ORIF by Dr. Mcdonnell. State she has been non weight bearing for six weeks. Was recently cleared to weight bear on Monday. She normally uses rolling Walker at home however has been attempting to use bilateral crutches more at this time. Overall she is doing well with minimal pain.     Falls: recent episode     Imaging: [x] Xray [] MRI [] CT: Performed on: 6/10/24    FINDINGS:  Plate and screw fixation seen across the distal fibula and posterior malleolus with a single screw seen across the medial malleolus.  No hardware failure or loosening.  No periprosthetic fracture.  Splinting  material has been removed in the interval.  Fracture fragment alignment is near anatomic.    Prior Therapy:   [x] N/A  [] Yes:   Social History: Pt lives with their family [x] House [] Apartment/Condo []other  Stairs: [x] No  [] Yes:   Occupation: Patient is not currently working  School/Work Restrictions: [x] None [] Yes:   Prior Level of Function: Independent and pain free with all activities of daily living  Current Level of Function: requires assist with ADLs, driving, and is currently ambulating with bilaterally crutches with CAM boot     Pain:  Current 2/10, worst 3/10  Location: [x] Right   [] Left:    Description: Shooting to anterior ankle  Aggravating Factors: increase walking   Easing Factors: activity avoidance, rest    Patients goals: improve walking and return to PLOF      Medical History:   Past Medical History:   Diagnosis Date    Alcohol use, unspecified with unspecified alcohol-induced disorder     Anxiety     Hypertension     Miscarriage      Surgical History:   Esther Romo  has a past surgical history that includes Breast lumpectomy and Open reduction and internal fixation (ORIF) of injury of ankle (Right, 4/27/2024).    Medications:   Esther has a current medication list which includes the following prescription(s): acetaminophen, aspirin, buspirone, hydroxyzine hcl, lisinopril-hydrochlorothiazide, multivit-minerals/folic acid, trazodone, and venlafaxine.    Allergies:   Review of patient's allergies indicates:  No Known Allergies     OBJECTIVE     OBSERVATION: CAM boot donned     POSTURE: Pt presents with postural abnormalities which include:    [x] Forward Head   [] Increased Lumbar Lordosis   [x] Rounded Shoulder   [] Genu Recurvatum   [] Increased Thoracic Kyphosis [] Genu Valgus   [] Trunk Deviated    [] Pes Planus   [] Scapular Winging    [] Other:     GAIT: Pt ambulated with bilateral crutches 200 feet limping gait pattern due to CAM boot. Observed decreased step length and step  to pattern.     STRENGTH:   Lower Extremity  Strength RIGHT   LEFT   Hip Flexion  4/5 4/5   Hip abduction  4/5 4/5   Hip extension 4/5 4/5   Knee Flexion 4+/5 4+/5   Knee Extension 4+/5 4+/5   Ankle Dorsiflexion 3+/5 4+/5     RANGE OF MOTION: (*) pain and/or dysfunction    Knee AROM/PROM Right Left Notes   Knee Flexion (135º) 120 120    Knee Extension (0º) 0 0      Ankle/Foot AROM/PROM Right Left Notes   Dorsiflexion (20º) - 10 P! 5/10    Plantarflexion (50º) 50  80    Inversion (35º) 30 P! 45    Eversion (15º) 15 p! 35      SPECIAL TESTS:   None tested     SENSATION:  [] Intact to Light Touch     [x] Impaired: to light touch to surgical incision     PALPATION: tenderness to palpation to right gastroc     JOINT MOBILITY: ankle mobility     Intake Outcome Measure for FOTO ankle Survey    Therapist reviewed FOTO scores for Esther Membreno Ridout on 6/11/2024.   FOTO documents entered into [employer] - see Media section.    Intake Score: 44%       TREATMENT     Total Treatment time (time-based codes) separate from Evaluation: 15 minutes      Esther received the treatments listed below:      CPT Intervention  JointFocus Duration / Intensity  6/11   MT STM  gastroc and full foot    MT mobs Gentle ankle mobs  Metatarsals      TE  ankle ROM   2 x 10 ea   Pumps  CW  CCW     TE  towel crunches  2 x 10     TE  seated stretches with towel    3 x 30s   Gastroc  Soleus                           PLAN          CPT Codes available for Billing:   (-) minutes of Manual therapy (MT) to improve pain and ROM.  (20) minutes of Therapeutic Exercise (TE) to develop strength, endurance, range of motion, and flexibility.  (-) minutes of Neuromuscular Re-Education (NMR)  to improve: Balance, Coordination, Kinesthetic, Sense, Proprioception, and Posture.  (-) minutes of Therapeutic Activities (TA) to improve functional performance.  (-) minutes of Gait Training (GT) to improve functional mobility and safety.  Unattended Electrical Stimulation (ES) for  muscle performance or pain modulation.  Vasopneumatic Device Therapy () for management of swelling/edema. (94845)  BFR: Blood flow restriction applied during exercise  NP or (-): Not Performed       PATIENT EDUCATION AND HOME EXERCISES     Education provided:    - HEP provided   - PT role in POC     Written Home Exercises Provided: yes.  Exercises were reviewed and Esther was able to demonstrate them prior to the end of the session.  Esther demonstrated good  understanding of the education provided. See EMR under Patient Instructions for exercises provided during therapy sessions.    ASSESSMENT     Esther is a 34 y.o. adult referred to outpatient Physical Therapy with a medical diagnosis of S/p R ankle ORIF by Dr. Mcdonnell. Pt presents with impairments in the following categories: IMPAIRMENTS: ROM, strength, endurance, joint mobility, balance, gait mechanics, functional movement patterns, post-operative precautions, and scar tissue.     Patient prognosis is Good.   Patient will benefit from skilled outpatient Physical Therapy to address the deficits stated above and in the chart below, provide patient /family education, and to maximize patientt's level of independence.     Plan of care discussed with patient: yes   Patient's spiritual, cultural and educational needs considered and patient is agreeable to the plan of care and goals as stated below:     Anticipated Barriers for therapy: transportation    Medical Necessity is demonstrated by the following  History  Co-morbidities and personal factors that may impact the plan of care [x] LOW: no personal factors / co-morbidities  [] MODERATE: 1-2 personal factors / co-morbidities  [] HIGH: 3+ personal factors / co-morbidities    Moderate / High Support Documentation:   Past Medical History:   Diagnosis Date    Alcohol use, unspecified with unspecified alcohol-induced disorder     Anxiety     Hypertension     Miscarriage         Examination  Body Structures and  Functions, activity limitations and participation restrictions that may impact the plan of care [x] LOW: addressing 1-2 elements  [] MODERATE: 3+ elements  [] HIGH: 4+ elements (please support below)    Moderate / High Support Documentation:   [] Head / Neck  [] Spine  [] Upper Quarter  [] Lower Quarter  [] Range of motion Deficits  [] Gross Symmetry Deficits  [] Strength Deficits  [] Balance Deficits  [] Gait Deficits  [] Unable to participate in daily activities  [] Unable to perform functional tasks  [] Unable to Care for Self or others  [] Community/ Social Life changes due to impairments     Clinical Presentation [x] LOW: stable  [] MODERATE: Evolving  [] HIGH: Unstable     Decision Making/ Complexity Score: low         Short Term Goals:  6 weeks Status  Date Met   PAIN: Pt will report worst pain of 5/10 in order to progress toward max functional ability and improve quality of life. [x] Progressing  [] Met  [] Not Met    FUNCTION: Patient will improve functional outcome test score by 10 points  [x] Progressing  [] Met  [] Not Met    MOBILITY: Patient will improve ankle AROM by 15 percent in order to progress towards independence with functional activities.  [x] Progressing  [] Met  [] Not Met    STRENGTH: Patient will improve strength by 1 grade  in order to progress towards independence with functional activities. [x] Progressing  [] Met  [] Not Met    POSTURE: Patient will correct postural deviations in sitting and standing, to decrease pain and promote long term stability.  [x] Progressing  [] Met  [] Not Met    GAIT: Patient will demonstrate improved gait mechanics including least restricted device in order to improve functional mobility, improve quality of life, and decrease risk of further injury or fall.  [x] Progressing  [] Met  [] Not Met    HEP: Patient will demonstrate independence with HEP in order to progress toward functional independence. [x] Progressing  [] Met  [] Not Met      Long Term Goals:   12 weeks Status Date Met   PAIN: Pt will report worst pain of 1/10 in order to progress toward max functional ability and improve quality of life [x] Progressing  [] Met  [] Not Met    FUNCTION: Patient will demonstrate improved function as indicated by an increase of 20 points on FOTO [x] Progressing  [] Met  [] Not Met    MOBILITY: Patient will improve ankle AROM to WFL in order to return to maximal functional potential and improve quality of life.  [x] Progressing  [] Met  [] Not Met    STRENGTH: Patient will improve strength to 4+/5 without pain in order to improve functional independence and quality of life. [x] Progressing  [] Met  [] Not Met    GAIT: Patient will demonstrate normalized gait mechanics with minimal compensation in order to return to PLOF. [x] Progressing  [] Met  [] Not Met    Patient will return to normal ADL's, IADL's, community involvement, recreational activities, and work-related activities with less than or equal to 1/10 pain and maximal function.  [x] Progressing  [] Met  [] Not Met        PLAN   Plan of care Certification: 6/11/2024 to 9/6/2024    Outpatient Physical Therapy 2 times weekly for 12 weeks to include any combination of the following interventions: virtual visits, dry needling, modalities, electrical stimulation (IFC, Pre-Mod, Attended with Functional Dry Needling), Electrical Stimulation  , Gait Training, Manual Therapy, Moist Heat/ Ice, Neuromuscular Re-ed, Patient Education, Self Care, Therapeutic Exercise, Functional Training, and Therapeutic Activites     Thank you for this referral.    Hoa Garnett, PT, DPT    I CERTIFY THE NEED FOR THESE SERVICES FURNISHED UNDER THIS PLAN OF TREATMENT AND WHILE UNDER MY CARE   Physician's comments:     Physician's Signature: ___________________________________________________

## 2024-06-12 PROBLEM — R26.9 GAIT ABNORMALITY: Status: ACTIVE | Noted: 2024-06-12

## 2024-06-12 PROBLEM — M25.673 DECREASED ROM OF ANKLE: Status: ACTIVE | Noted: 2024-06-12

## 2024-06-13 ENCOUNTER — DOCUMENTATION ONLY (OUTPATIENT)
Dept: REHABILITATION | Facility: HOSPITAL | Age: 34
End: 2024-06-13

## 2024-06-13 ENCOUNTER — CLINICAL SUPPORT (OUTPATIENT)
Dept: REHABILITATION | Facility: HOSPITAL | Age: 34
End: 2024-06-13
Payer: MEDICAID

## 2024-06-13 DIAGNOSIS — M25.671 DECREASED RANGE OF MOTION OF RIGHT ANKLE: Primary | ICD-10-CM

## 2024-06-13 DIAGNOSIS — R26.9 GAIT ABNORMALITY: ICD-10-CM

## 2024-06-13 PROCEDURE — 97110 THERAPEUTIC EXERCISES: CPT | Mod: PN,CQ

## 2024-06-13 NOTE — PROGRESS NOTES
PT/PTA met face to face to discuss pt's treatment plan and progress towards established goals. Pt will be seen by a physical therapist minimally every 6th visit or every 30 days.    Alvaro Mancia PTA

## 2024-06-18 ENCOUNTER — CLINICAL SUPPORT (OUTPATIENT)
Dept: REHABILITATION | Facility: HOSPITAL | Age: 34
End: 2024-06-18
Payer: MEDICAID

## 2024-06-18 DIAGNOSIS — M25.671 DECREASED RANGE OF MOTION OF RIGHT ANKLE: Primary | ICD-10-CM

## 2024-06-18 DIAGNOSIS — R26.9 GAIT ABNORMALITY: ICD-10-CM

## 2024-06-18 PROCEDURE — 97110 THERAPEUTIC EXERCISES: CPT | Mod: PN,CQ

## 2024-06-18 PROCEDURE — 97140 MANUAL THERAPY 1/> REGIONS: CPT | Mod: PN,CQ

## 2024-06-18 PROCEDURE — 97112 NEUROMUSCULAR REEDUCATION: CPT | Mod: PN,CQ

## 2024-06-18 NOTE — PROGRESS NOTES
[facility] OUTPATIENT THERAPY AND WELLNESS   Physical Therapy Treatment Note      Name: Esther Membreno [facility]  Clinic Number: [id number]    Therapy Diagnosis:   Encounter Diagnoses   Name Primary?    Decreased range of motion of right ankle Yes    Gait abnormality      Physician: Donell Cruz PA-C    Visit Date: 6/18/2024    Physician Orders: PT Eval and Treat   Medical Diagnosis from Referral: Closed fracture dislocation of right ankle joint, with routine healing, subsequent encounter [H70.147C]   Evaluation Date: 6/11/2024  Authorization Period Expiration: 6/10/25  Plan of Care Expiration: 9/6/24  Progress Note Due: 7/11/24  Visit # / Visits authorized: 1/1 eval; 2/20  FOTO: 1/3 (last performed 6/11/2024)     PRECAUTIONS: Standard      MD Follow up: 7/22/24     Date of Surgery  4/27/24   Surgery Performed  Open reduction internal fixation right ankle pilon fracture fixation both distal tibia and fibula   Post-Op Precautions WBAT with CAM boot       PTA Visit #: 1/5     Time In: 11:00am  Time Out: 12:02pm  Total Billable Time: 45 minutes    Subjective     Patient reports: getting an even up since last visit for left shoe which has provided her more comfort being on her feet. Patient reports she has been on her feet more since last visit attempting to do what she can tolerate walking 4000 steps the other day. Patient does admit to walking short distances in her house with the boot but without her crutches but only to what she feels is okay.   Esther was compliant with home exercise program.  Response to previous treatment: good  Functional change: n/a    Pain: 2/10  Location: right ankles     Objective      Objective Measures updated at progress report unless specified.     Treatment     Esther received the treatments listed below:      CPT Intervention  JointFocus Duration / Intensity  6/11   MT STM  gastroc and full foot    MT mobs Gentle ankle mobs  Metatarsals      TE  seated stretches with towel    3 x 30s  "  Gastroc  Soleus    TE  Seated heel slides  2 x10    TE [place] Pickups 2 x1 minute   TE  Seated ankle ABC 2 laps   NMR  Seated toe splaying 3" 2 x10   NMR  Seated Toe Waves 3 x10   NMR  Seated Toe Yoga 2 x10   NMR  Short Foot 5" 3 x10   TE  BAPS up/down, side/side, cw/ccw  X15 each   TE Sidelying ankle inversion 3x10   TE Sidelying ankle eversion 3 x10          PLAN          CPT Codes available for Billing:   (09) minutes of Manual therapy (MT) to improve pain and ROM.  (12) minutes of Therapeutic Exercise (TE) to develop strength, endurance, range of motion, and flexibility.  (24) minutes of Neuromuscular Re-Education (NMR)  to improve: Balance, Coordination, Kinesthetic, Sense, Proprioception, and Posture.  (-) minutes of Therapeutic Activities (TA) to improve functional performance.  (-) minutes of Gait Training (GT) to improve functional mobility and safety.  Unattended Electrical Stimulation (ES) for muscle performance or pain modulation.  Vasopneumatic Device Therapy () for management of swelling/edema. (29661)  BFR: Blood flow restriction applied during exercise  NP or (-): Not Performed    Patient Education and Home Exercises       Education provided:       Written Home Exercises Provided: Patient instructed to cont prior HEP. Exercises were reviewed and Esther was able to demonstrate them prior to the end of the session.  Esther demonstrated good  understanding of the education provided. See Electronic Medical Record under Patient Instructions for exercises provided during therapy sessions    Assessment     Continued work of previous treatment visit with minimal progression in response to patient's subjective reports of increased activity and soreness. Included manual therapy to right ankle/foot in attempt to improve ankle mobility especially ankle dorsiflexion. Continued limitations in ankle dorsiflexion present with discomfort present in anterior ankle more present compared to calf musculature. Patient " demonstrates improvements with foot intrinsic exercises but difficulty is still noted for toe yoga and short foot. Plan to continue progression of treatment within weightbearing restrictions provided as patient is able to tolerate.    Esther Is progressing well towards Esther's goals.   Patient prognosis is Good.     Patient will continue to benefit from skilled outpatient physical therapy to address the deficits listed in the problem list box on initial evaluation, provide pt/family education and to maximize pt's level of independence in the home and community environment.     Patient's spiritual, cultural and educational needs considered and pt agreeable to plan of care and goals.     Anticipated barriers to physical therapy: transportation    Goals:   Short Term Goals:  6 weeks Status  Date Met   PAIN: Pt will report worst pain of 5/10 in order to progress toward max functional ability and improve quality of life. [x] Progressing  [] Met  [] Not Met     FUNCTION: Patient will improve functional outcome test score by 10 points  [x] Progressing  [] Met  [] Not Met     MOBILITY: Patient will improve ankle AROM by 15 percent in order to progress towards independence with functional activities.  [x] Progressing  [] Met  [] Not Met     STRENGTH: Patient will improve strength by 1 grade  in order to progress towards independence with functional activities. [x] Progressing  [] Met  [] Not Met     POSTURE: Patient will correct postural deviations in sitting and standing, to decrease pain and promote long term stability.  [x] Progressing  [] Met  [] Not Met     GAIT: Patient will demonstrate improved gait mechanics including least restricted device in order to improve functional mobility, improve quality of life, and decrease risk of further injury or fall.  [x] Progressing  [] Met  [] Not Met     HEP: Patient will demonstrate independence with HEP in order to progress toward functional independence. [x] Progressing  []  Met  [] Not Met        Long Term Goals:  12 weeks Status Date Met   PAIN: Pt will report worst pain of 1/10 in order to progress toward max functional ability and improve quality of life [x] Progressing  [] Met  [] Not Met     FUNCTION: Patient will demonstrate improved function as indicated by an increase of 20 points on FOTO [x] Progressing  [] Met  [] Not Met     MOBILITY: Patient will improve ankle AROM to WFL in order to return to maximal functional potential and improve quality of life.  [x] Progressing  [] Met  [] Not Met     STRENGTH: Patient will improve strength to 4+/5 without pain in order to improve functional independence and quality of life. [x] Progressing  [] Met  [] Not Met     GAIT: Patient will demonstrate normalized gait mechanics with minimal compensation in order to return to PLOF. [x] Progressing  [] Met  [] Not Met     Patient will return to normal ADL's, IADL's, community involvement, recreational activities, and work-related activities with less than or equal to 1/10 pain and maximal function.  [x] Progressing  [] Met  [] Not Met           PLAN   Plan of care Certification: 6/11/2024 to 9/6/2024     Outpatient Physical Therapy 2 times weekly for 12 weeks to include any combination of the following interventions: virtual visits, dry needling, modalities, electrical stimulation (IFC, Pre-Mod, Attended with Functional Dry Needling), Electrical Stimulation  , Gait Training, Manual Therapy, Moist Heat/ Ice, Neuromuscular Re-ed, Patient Education, Self Care, Therapeutic Exercise, Functional Training, and Therapeutic Activites     Alvaro Mancia PTA

## 2024-06-20 ENCOUNTER — CLINICAL SUPPORT (OUTPATIENT)
Dept: REHABILITATION | Facility: HOSPITAL | Age: 34
End: 2024-06-20
Payer: MEDICAID

## 2024-06-20 DIAGNOSIS — R26.9 GAIT ABNORMALITY: ICD-10-CM

## 2024-06-20 DIAGNOSIS — M25.671 DECREASED RANGE OF MOTION OF RIGHT ANKLE: Primary | ICD-10-CM

## 2024-06-20 PROCEDURE — 97112 NEUROMUSCULAR REEDUCATION: CPT | Mod: PN,CQ

## 2024-06-20 PROCEDURE — 97110 THERAPEUTIC EXERCISES: CPT | Mod: PN,CQ

## 2024-06-20 PROCEDURE — 97140 MANUAL THERAPY 1/> REGIONS: CPT | Mod: PN,CQ

## 2024-06-20 NOTE — PROGRESS NOTES
"[facility]   Physical Therapy Treatment Note      Name: Esther Membreno [facility]  Clinic Number: [id number]    Therapy Diagnosis:   Encounter Diagnoses   Name Primary?    Decreased range of motion of right ankle Yes    Gait abnormality      Physician: Donell Cruz PA-C    Visit Date: 6/20/2024    Physician Orders: PT Eval and Treat   Medical Diagnosis from Referral: Closed fracture dislocation of right ankle joint, with routine healing, subsequent encounter [S87.438Y]   Evaluation Date: 6/11/2024  Authorization Period Expiration: 6/10/25  Plan of Care Expiration: 9/6/24  Progress Note Due: 7/11/24  Visit # / Visits authorized: 1/1 eval; 2/20  FOTO: 1/3 (last performed 6/11/2024)     PRECAUTIONS: Standard      MD Follow up: 7/22/24     Date of Surgery  4/27/24   Surgery Performed  Open reduction internal fixation right ankle pilon fracture fixation both distal tibia and fibula   Post-Op Precautions WBAT with CAM boot       PTA Visit #: 1/5     Time In: 11:00am  Time Out: 12:05pm  Total Billable Time: 55 minutes    Subjective     Patient reports: had improvements in ankle/foot following last treatment visit but reports a reoccurrence of stiffness and aching in anterior ankle today.   Esther was compliant with home exercise program.  Response to previous treatment: good  Functional change: n/a    Pain: 2/10  Location: right ankles     Objective      Objective Measures updated at progress report unless specified.     Treatment     Esther received the treatments listed below:      CPT Intervention  JointFocus Duration / Intensity  6/11   MT STM  gastroc and full foot    MT mobs Gentle ankle mobs  Metatarsals     TE Nustep  5 minutes res 4    TE  seated stretches with towel    3 x 30s   Gastroc  Soleus    TE  Seated heel slides  2 x10    TE [place] Pickups 2 x1 minute   TE  Seated ankle ABC 2 laps   NMR  Seated toe splaying 3" 2 x10   NMR  Seated Toe Waves 3 x10   NMR  Seated Toe Yoga 2 x10   NMR  " "Short Foot 5" 3 x10   TE  BAPS up/down, side/side, cw/ccw  X30 each   TE Sidelying ankle inversion 2# 3x10   TE Sidelying ankle eversion 2# 3 x10          PLAN          CPT Codes available for Billing:   (12) minutes of Manual therapy (MT) to improve pain and ROM.  (18) minutes of Therapeutic Exercise (TE) to develop strength, endurance, range of motion, and flexibility.  (25) minutes of Neuromuscular Re-Education (NMR)  to improve: Balance, Coordination, Kinesthetic, Sense, Proprioception, and Posture.  (-) minutes of Therapeutic Activities (TA) to improve functional performance.  (-) minutes of Gait Training (GT) to improve functional mobility and safety.  Unattended Electrical Stimulation (ES) for muscle performance or pain modulation.  Vasopneumatic Device Therapy () for management of swelling/edema. (34614)  BFR: Blood flow restriction applied during exercise  NP or (-): Not Performed    Patient Education and Home Exercises       Education provided:       Written Home Exercises Provided: Patient instructed to cont prior HEP. Exercises were reviewed and Esther was able to demonstrate them prior to the end of the session.  Esther demonstrated good  understanding of the education provided. See Electronic Medical Record under Patient Instructions for exercises provided during therapy sessions    Assessment     Included additional manual to ankle/foot today in attempt to help improve mobility especially dorsiflexion. Patient demonstrated improved comfort with mobility following manual but continued to demonstrate limitations with heel slides and gastroc/soleus stretching. Patient was able to tolerate weight added to improve ankle inversion/eversion strength without any subjective complaints. Plan to continue progression of treatment per current weightbearing restrictions as tolerated..    Esther Is progressing well towards Esther's goals.   Patient prognosis is Good.     Patient will continue to benefit from " skilled outpatient physical therapy to address the deficits listed in the problem list box on initial evaluation, provide pt/family education and to maximize pt's level of independence in the home and community environment.     Patient's spiritual, cultural and educational needs considered and pt agreeable to plan of care and goals.     Anticipated barriers to physical therapy: transportation    Goals:   Short Term Goals:  6 weeks Status  Date Met   PAIN: Pt will report worst pain of 5/10 in order to progress toward max functional ability and improve quality of life. [x] Progressing  [] Met  [] Not Met     FUNCTION: Patient will improve functional outcome test score by 10 points  [x] Progressing  [] Met  [] Not Met     MOBILITY: Patient will improve ankle AROM by 15 percent in order to progress towards independence with functional activities.  [x] Progressing  [] Met  [] Not Met     STRENGTH: Patient will improve strength by 1 grade  in order to progress towards independence with functional activities. [x] Progressing  [] Met  [] Not Met     POSTURE: Patient will correct postural deviations in sitting and standing, to decrease pain and promote long term stability.  [x] Progressing  [] Met  [] Not Met     GAIT: Patient will demonstrate improved gait mechanics including least restricted device in order to improve functional mobility, improve quality of life, and decrease risk of further injury or fall.  [x] Progressing  [] Met  [] Not Met     HEP: Patient will demonstrate independence with HEP in order to progress toward functional independence. [x] Progressing  [] Met  [] Not Met        Long Term Goals:  12 weeks Status Date Met   PAIN: Pt will report worst pain of 1/10 in order to progress toward max functional ability and improve quality of life [x] Progressing  [] Met  [] Not Met     FUNCTION: Patient will demonstrate improved function as indicated by an increase of 20 points on FOTO [x] Progressing  [] Met  []  Not Met     MOBILITY: Patient will improve ankle AROM to WFL in order to return to maximal functional potential and improve quality of life.  [x] Progressing  [] Met  [] Not Met     STRENGTH: Patient will improve strength to 4+/5 without pain in order to improve functional independence and quality of life. [x] Progressing  [] Met  [] Not Met     GAIT: Patient will demonstrate normalized gait mechanics with minimal compensation in order to return to PLOF. [x] Progressing  [] Met  [] Not Met     Patient will return to normal ADL's, IADL's, community involvement, recreational activities, and work-related activities with less than or equal to 1/10 pain and maximal function.  [x] Progressing  [] Met  [] Not Met           PLAN   Plan of care Certification: 6/11/2024 to 9/6/2024     Outpatient Physical Therapy 2 times weekly for 12 weeks to include any combination of the following interventions: virtual visits, dry needling, modalities, electrical stimulation (IFC, Pre-Mod, Attended with Functional Dry Needling), Electrical Stimulation  , Gait Training, Manual Therapy, Moist Heat/ Ice, Neuromuscular Re-ed, Patient Education, Self Care, Therapeutic Exercise, Functional Training, and Therapeutic Activites     Alvaro Mancia, PTA

## 2024-06-25 ENCOUNTER — CLINICAL SUPPORT (OUTPATIENT)
Dept: REHABILITATION | Facility: HOSPITAL | Age: 34
End: 2024-06-25
Payer: MEDICAID

## 2024-06-25 DIAGNOSIS — M25.671 DECREASED RANGE OF MOTION OF RIGHT ANKLE: Primary | ICD-10-CM

## 2024-06-25 DIAGNOSIS — R26.9 GAIT ABNORMALITY: ICD-10-CM

## 2024-06-25 PROCEDURE — 97110 THERAPEUTIC EXERCISES: CPT | Mod: PN,CQ

## 2024-06-25 NOTE — PROGRESS NOTES
"[facility]   Physical Therapy Treatment Note      Name: Esther Membreno [facility]  Clinic Number: [id number]    Therapy Diagnosis:   Encounter Diagnoses   Name Primary?    Decreased range of motion of right ankle Yes    Gait abnormality      Physician: Donell Cruz PA-C    Visit Date: 6/25/2024    Physician Orders: PT Eval and Treat   Medical Diagnosis from Referral: Closed fracture dislocation of right ankle joint, with routine healing, subsequent encounter [H02.987A]   Evaluation Date: 6/11/2024  Authorization Period Expiration: 6/10/25  Plan of Care Expiration: 9/6/24  Progress Note Due: 7/11/24  Visit # / Visits authorized: 1/1 eval; 2/20  FOTO: 1/3 (last performed 6/11/2024)     PRECAUTIONS: Standard      MD Follow up: 7/22/24     Date of Surgery  4/27/24   Surgery Performed  Open reduction internal fixation right ankle pilon fracture fixation both distal tibia and fibula   Post-Op Precautions WBAT with CAM boot       PTA Visit #: 1/5     Time In: 11:00am  Time Out: 12:05pm  Total Billable Time: 55 minutes    Subjective     Patient reports: had improvements in ankle/foot following last treatment visit but reports a reoccurrence of stiffness and aching in anterior ankle today.   Esther was compliant with home exercise program.  Response to previous treatment: good  Functional change: n/a    Pain: 2/10  Location: right ankles     Objective      Objective Measures updated at progress report unless specified.     Treatment     Esther received the treatments listed below:      CPT Intervention  JointFocus Duration / Intensity  6/11   MT STM  gastroc and full foot    MT mobs Gentle ankle mobs  Metatarsals     TE Nustep  5 minutes res 4    TE  seated stretches with towel    3 x 30s   Gastroc  Soleus    TE  Seated heel slides  2 x10    TE [place] Pickups  -    TE  Seated ankle ABC 1# 2 sets   NMR  Seated toe splaying 5" 3 x10   NMR  Seated Heel Raises 3 x10   NMR  Seated Toe Yoga 2 x10   NMR  " "Short Foot 5" 3 x10   TE  BAPS up/down, side/side, cw/ccw  X30 each   TE Sidelying ankle inversion 2# 3x10   TE Sidelying ankle eversion 2# 3 x10   TE Seated ankle DF 2#  3 x10   PLAN          CPT Codes available for Billing:   (09) minutes of Manual therapy (MT) to improve pain and ROM.  (11) minutes of Therapeutic Exercise (TE) to develop strength, endurance, range of motion, and flexibility.  (10) minutes of Neuromuscular Re-Education (NMR)  to improve: Balance, Coordination, Kinesthetic, Sense, Proprioception, and Posture.  (-) minutes of Therapeutic Activities (TA) to improve functional performance.  (-) minutes of Gait Training (GT) to improve functional mobility and safety.  Unattended Electrical Stimulation (ES) for muscle performance or pain modulation.  Vasopneumatic Device Therapy () for management of swelling/edema. (59530)  BFR: Blood flow restriction applied during exercise  NP or (-): Not Performed    Patient Education and Home Exercises       Education provided:       Written Home Exercises Provided: Patient instructed to cont prior HEP. Exercises were reviewed and Esther was able to demonstrate them prior to the end of the session.  Esther demonstrated good  understanding of the education provided. See Electronic Medical Record under Patient Instructions for exercises provided during therapy sessions    Assessment     Continued focus of treatment attempting to further improve ankle Range of Motion and strength as well as foot intrinsic strength. Patient continues to present with redness in anteromedial foot with minor tenderness present with palpation. Continued limitations of ankle dorsiflexion present, but patient is beginning to find calf tightness with stretching with decreased joint stiffness. Continued weightbearing restrictions with CAM boot and bilateral crutches per MD protocol.    Esther Is progressing well towards Esther's goals.   Patient prognosis is Good.     Patient will continue to " benefit from skilled outpatient physical therapy to address the deficits listed in the problem list box on initial evaluation, provide pt/family education and to maximize pt's level of independence in the home and community environment.     Patient's spiritual, cultural and educational needs considered and pt agreeable to plan of care and goals.     Anticipated barriers to physical therapy: transportation    Goals:   Short Term Goals:  6 weeks Status  Date Met   PAIN: Pt will report worst pain of 5/10 in order to progress toward max functional ability and improve quality of life. [x] Progressing  [] Met  [] Not Met     FUNCTION: Patient will improve functional outcome test score by 10 points  [x] Progressing  [] Met  [] Not Met     MOBILITY: Patient will improve ankle AROM by 15 percent in order to progress towards independence with functional activities.  [x] Progressing  [] Met  [] Not Met     STRENGTH: Patient will improve strength by 1 grade  in order to progress towards independence with functional activities. [x] Progressing  [] Met  [] Not Met     POSTURE: Patient will correct postural deviations in sitting and standing, to decrease pain and promote long term stability.  [x] Progressing  [] Met  [] Not Met     GAIT: Patient will demonstrate improved gait mechanics including least restricted device in order to improve functional mobility, improve quality of life, and decrease risk of further injury or fall.  [x] Progressing  [] Met  [] Not Met     HEP: Patient will demonstrate independence with HEP in order to progress toward functional independence. [x] Progressing  [] Met  [] Not Met        Long Term Goals:  12 weeks Status Date Met   PAIN: Pt will report worst pain of 1/10 in order to progress toward max functional ability and improve quality of life [x] Progressing  [] Met  [] Not Met     FUNCTION: Patient will demonstrate improved function as indicated by an increase of 20 points on FOTO [x]  Progressing  [] Met  [] Not Met     MOBILITY: Patient will improve ankle AROM to WFL in order to return to maximal functional potential and improve quality of life.  [x] Progressing  [] Met  [] Not Met     STRENGTH: Patient will improve strength to 4+/5 without pain in order to improve functional independence and quality of life. [x] Progressing  [] Met  [] Not Met     GAIT: Patient will demonstrate normalized gait mechanics with minimal compensation in order to return to PLOF. [x] Progressing  [] Met  [] Not Met     Patient will return to normal ADL's, IADL's, community involvement, recreational activities, and work-related activities with less than or equal to 1/10 pain and maximal function.  [x] Progressing  [] Met  [] Not Met           PLAN   Plan of care Certification: 6/11/2024 to 9/6/2024     Outpatient Physical Therapy 2 times weekly for 12 weeks to include any combination of the following interventions: virtual visits, dry needling, modalities, electrical stimulation (IFC, Pre-Mod, Attended with Functional Dry Needling), Electrical Stimulation  , Gait Training, Manual Therapy, Moist Heat/ Ice, Neuromuscular Re-ed, Patient Education, Self Care, Therapeutic Exercise, Functional Training, and Therapeutic Activites     Alvaro Mancia, SOL

## 2024-06-27 ENCOUNTER — CLINICAL SUPPORT (OUTPATIENT)
Dept: REHABILITATION | Facility: HOSPITAL | Age: 34
End: 2024-06-27
Payer: MEDICAID

## 2024-06-27 DIAGNOSIS — M25.671 DECREASED RANGE OF MOTION OF RIGHT ANKLE: Primary | ICD-10-CM

## 2024-06-27 DIAGNOSIS — R26.9 GAIT ABNORMALITY: ICD-10-CM

## 2024-06-27 PROCEDURE — 97110 THERAPEUTIC EXERCISES: CPT | Mod: PN

## 2024-06-27 NOTE — PROGRESS NOTES
"[facility]   Physical Therapy Treatment Note      Name: Esther Romo  Clinic Number: [id number]    Therapy Diagnosis:   Encounter Diagnoses   Name Primary?    Decreased range of motion of right ankle Yes    Gait abnormality      Physician: Donell Cruz PA-C    Visit Date: 6/27/2024    Physician Orders: PT Eval and Treat   Medical Diagnosis from Referral: Closed fracture dislocation of right ankle joint, with routine healing, subsequent encounter [M69.616D]   Evaluation Date: 6/11/2024  Authorization Period Expiration: 6/10/25  Plan of Care Expiration: 9/6/24  Progress Note Due: 7/11/24  Visit # / Visits authorized: 1/1 eval; 2/20  FOTO: 1/3 (last performed 6/11/2024)     PRECAUTIONS: Standard      MD Follow up: 7/22/24     Date of Surgery  4/27/24   Surgery Performed  Open reduction internal fixation right ankle pilon fracture fixation both distal tibia and fibula   Post-Op Precautions WBAT with CAM boot       PTA Visit #: 1/5     Time In: 10:00am  Time Out: 11:00pm  Total Billable Time: 55 minutes    Subjective     Patient reports: improving ankle pain    Esther was compliant with home exercise program.  Response to previous treatment: good  Functional change: n/a    Pain: 2/10  Location: right ankles     Objective      Objective Measures updated at progress report unless specified.     Treatment     Esther received the treatments listed below:      CPT Intervention  JointFocus Duration / Intensity  6/27   MT STM R gastroc    MT mobs Pr[place] ankle mobs    TE Nustep  -    TE stretches c strap  3x30"  Gastroc  Soleus  Prone quad   TE 3 way hip on mat  3x10  Flexion  Abduction  extension   TE [place] Pickups  x1    TE  BAPS up/down, side/side, cw/ccw  x20 ea    TE 4 way ankle   x10 ea, YTB      Sit to stand with CAM boot donned   +NV    TE Seated ankle ABC 1# -    TE Seated heel slides -   NMR  Seated toe splaying -   NMR  Seated Heel Raises -   NMR  Seated Toe Yoga -   NMR  Short Foot " -   TE Sidelying ankle inversion 2# -   TE Sidelying ankle eversion 2# -   TE Seated ankle DF 2#  -   PLAN          CPT Codes available for Billing:   (-) minutes of Manual therapy (MT) to improve pain and ROM.  (60) minutes of Therapeutic Exercise (TE) to develop strength, endurance, range of motion, and flexibility.  (-) minutes of Neuromuscular Re-Education (NMR)  to improve: Balance, Coordination, Kinesthetic, Sense, Proprioception, and Posture.  (-) minutes of Therapeutic Activities (TA) to improve functional performance.  (-) minutes of Gait Training (GT) to improve functional mobility and safety.  Unattended Electrical Stimulation (ES) for muscle performance or pain modulation.  Vasopneumatic Device Therapy () for management of swelling/edema. (60168)  BFR: Blood flow restriction applied during exercise  NP or (-): Not Performed    Patient Education and Home Exercises       Education provided:       Written Home Exercises Provided: Patient instructed to cont prior HEP. Exercises were reviewed and Esther was able to demonstrate them prior to the end of the session.  Esther demonstrated good  understanding of the education provided. See Electronic Medical Record under Patient Instructions for exercises provided during therapy sessions    Assessment     Pt demonstrates improvement in ankle eversion ROM, swelling, and gait at this time. Pt limited by decreased posterior tibia muscle length, decreased ankle ROM, and stability. Incorporated hip strengthening exercises and emphasized important of stretching posterior tibia at home. Performed manual to improve ankle mobility. Pt with good tolerance to today's session; continue to focus on ankle ROM within tolerance, quad strength, and hip strength at this time. Instructed pt to wean to unilateral crutches at this time.     Esther Is progressing well towards Esther's goals.   Patient prognosis is Good.     Patient will continue to benefit from skilled outpatient  physical therapy to address the deficits listed in the problem list box on initial evaluation, provide pt/family education and to maximize pt's level of independence in the home and community environment.     Patient's spiritual, cultural and educational needs considered and pt agreeable to plan of care and goals.     Anticipated barriers to physical therapy: transportation    Goals:   Short Term Goals:  6 weeks Status  Date Met   PAIN: Pt will report worst pain of 5/10 in order to progress toward max functional ability and improve quality of life. [x] Progressing  [] Met  [] Not Met     FUNCTION: Patient will improve functional outcome test score by 10 points  [x] Progressing  [] Met  [] Not Met     MOBILITY: Patient will improve ankle AROM by 15 percent in order to progress towards independence with functional activities.  [x] Progressing  [] Met  [] Not Met     STRENGTH: Patient will improve strength by 1 grade  in order to progress towards independence with functional activities. [x] Progressing  [] Met  [] Not Met     POSTURE: Patient will correct postural deviations in sitting and standing, to decrease pain and promote long term stability.  [x] Progressing  [] Met  [] Not Met     GAIT: Patient will demonstrate improved gait mechanics including least restricted device in order to improve functional mobility, improve quality of life, and decrease risk of further injury or fall.  [x] Progressing  [] Met  [] Not Met     HEP: Patient will demonstrate independence with HEP in order to progress toward functional independence. [x] Progressing  [] Met  [] Not Met        Long Term Goals:  12 weeks Status Date Met   PAIN: Pt will report worst pain of 1/10 in order to progress toward max functional ability and improve quality of life [x] Progressing  [] Met  [] Not Met     FUNCTION: Patient will demonstrate improved function as indicated by an increase of 20 points on FOTO [x] Progressing  [] Met  [] Not Met     MOBILITY:  Patient will improve ankle AROM to WFL in order to return to maximal functional potential and improve quality of life.  [x] Progressing  [] Met  [] Not Met     STRENGTH: Patient will improve strength to 4+/5 without pain in order to improve functional independence and quality of life. [x] Progressing  [] Met  [] Not Met     GAIT: Patient will demonstrate normalized gait mechanics with minimal compensation in order to return to PLOF. [x] Progressing  [] Met  [] Not Met     Patient will return to normal ADL's, IADL's, community involvement, recreational activities, and work-related activities with less than or equal to 1/10 pain and maximal function.  [x] Progressing  [] Met  [] Not Met           PLAN   Plan of care Certification: 6/11/2024 to 9/6/2024     Outpatient Physical Therapy 2 times weekly for 12 weeks to include any combination of the following interventions: virtual visits, dry needling, modalities, electrical stimulation (IFC, Pre-Mod, Attended with Functional Dry Needling), Electrical Stimulation  , Gait Training, Manual Therapy, Moist Heat/ Ice, Neuromuscular Re-ed, Patient Education, Self Care, Therapeutic Exercise, Functional Training, and Therapeutic Activites     Hoa Garnett, PT

## 2024-06-28 ENCOUNTER — PATIENT MESSAGE (OUTPATIENT)
Dept: ORTHOPEDICS | Facility: CLINIC | Age: 34
End: 2024-06-28
Payer: MEDICAID

## 2024-07-02 ENCOUNTER — CLINICAL SUPPORT (OUTPATIENT)
Dept: REHABILITATION | Facility: HOSPITAL | Age: 34
End: 2024-07-02
Payer: MEDICAID

## 2024-07-02 DIAGNOSIS — R26.9 GAIT ABNORMALITY: ICD-10-CM

## 2024-07-02 DIAGNOSIS — M25.671 DECREASED RANGE OF MOTION OF RIGHT ANKLE: Primary | ICD-10-CM

## 2024-07-02 PROCEDURE — 97110 THERAPEUTIC EXERCISES: CPT | Mod: PN

## 2024-07-02 NOTE — PROGRESS NOTES
"[facility]   Physical Therapy Treatment Note      [facility]  Cl[facility] Number: [id number]    Therapy Diagnosis:   Encounter Diagnoses   Name Primary?    Decreased range of motion of right ankle Yes    Gait abnormality      Physician: Donell Cruz PA-C    Visit Date: 7/2/2024    Physician Orders: PT Eval and Treat   Medical Diagnosis from Referral: Closed fracture dislocation of right ankle joint, with routine healing, subsequent encounter [P83.950O]   Evaluation Date: 6/11/2024  Authorization Period Expiration: 6/10/25  Plan of Care Expiration: 9/6/24  Progress Note Due: 7/11/24  Visit # / Visits authorized: 1/1 eval; 2/20  FOTO: 1/3 (last performed 6/11/2024)     PRECAUTIONS: Standard      MD Follow up: 7/22/24     Date of Surgery  4/27/24   Surgery Performed  Open reduction internal fixation right ankle pilon fracture fixation both distal tibia and fibula   Post-Op Precautions WBAT with CAM boot       PTA Visit #: 1/5     Time In: 10:00am  Time Out: 11:00pm  Total Billable Time: 55 minutes    Subjective     Patient reports: improving ankle pain    Esther was compliant with home exercise program.  Response to previous treatment: good  Functional change: n/a    Pain: 2/10  Location: right ankles     Objective      Objective Measures updated at progress report unless specified.     Treatment     Esther received the treatments listed below:      CPT Intervention  JointFocus Duration / Intensity  7/2   MT STM R gastroc, soleus    MT mobs ankle mobs    TE Nustep  -    TE stretches c strap  3x30"  Gastroc  Soleus  Prone quad   TE [place] Pickups  x1   TE BAPS up/down, side/side, cw/ccw 2x10 ea    TE 4 way ankle  2x10 ea, BTB    NMR  Seated heel raises 2x10, 5lb above knee      Shuttle  2x10, 2 band   SLP  Single heel raises   TE 3 way hip on mat  -     Sit to stand with CAM boot donned  -   TE Seated ankle ABC 1#      TE Seated heel slides     NMR  Seated toe splaying     NMR  " Seated Heel Raises     NMR  Seated Toe Yoga     NMR  Short Foot     TE Sidelying ankle inversion 2#     TE Sidelying ankle eversion 2#     TE Seated ankle DF 2#      PLAN          CPT Codes available for Billing:   (-) minutes of Manual therapy (MT) to improve pain and ROM.  (60) minutes of Therapeutic Exercise (TE) to develop strength, endurance, range of motion, and flexibility.  (-) minutes of Neuromuscular Re-Education (NMR)  to improve: Balance, Coordination, Kinesthetic, Sense, Proprioception, and Posture.  (-) minutes of Therapeutic Activities (TA) to improve functional performance.  (-) minutes of Gait Training (GT) to improve functional mobility and safety.  Unattended Electrical Stimulation (ES) for muscle performance or pain modulation.  Vasopneumatic Device Therapy () for management of swelling/edema. (01208)  BFR: Blood flow restriction applied during exercise  NP or (-): Not Performed    Patient Education and Home Exercises       Education provided:       Written Home Exercises Provided: Patient instructed to cont prior HEP. Exercises were reviewed and Esther was able to demonstrate them prior to the end of the session.  Esther demonstrated good  understanding of the education provided. See Electronic Medical Record under Patient Instructions for exercises provided during therapy sessions    Assessment     Pt is 9 weeks post op at this time and is able to demonstrate normal gait pattern without assisted device and CAM boot donned. Incorporated anti-gravity gastroc strengthening during today's session. Pt with no increase of pain at end of session. Continue to progress as tolerated.     Esther Is progressing well towards Esther's goals.   Patient prognosis is Good.     Patient will continue to benefit from skilled outpatient physical therapy to address the deficits listed in the problem list box on initial evaluation, provide pt/family education and to maximize pt's level of independence in the home  and community environment.     Patient's spiritual, cultural and educational needs considered and pt agreeable to plan of care and goals.     Anticipated barriers to physical therapy: transportation    Goals:   Short Term Goals:  6 weeks Status  Date Met   PAIN: Pt will report worst pain of 5/10 in order to progress toward max functional ability and improve quality of life. [x] Progressing  [] Met  [] Not Met     FUNCTION: Patient will improve functional outcome test score by 10 points  [x] Progressing  [] Met  [] Not Met     MOBILITY: Patient will improve ankle AROM by 15 percent in order to progress towards independence with functional activities.  [x] Progressing  [] Met  [] Not Met     STRENGTH: Patient will improve strength by 1 grade  in order to progress towards independence with functional activities. [x] Progressing  [] Met  [] Not Met     POSTURE: Patient will correct postural deviations in sitting and standing, to decrease pain and promote long term stability.  [x] Progressing  [] Met  [] Not Met     GAIT: Patient will demonstrate improved gait mechanics including least restricted device in order to improve functional mobility, improve quality of life, and decrease risk of further injury or fall.  [x] Progressing  [] Met  [] Not Met     HEP: Patient will demonstrate independence with HEP in order to progress toward functional independence. [x] Progressing  [] Met  [] Not Met        Long Term Goals:  12 weeks Status Date Met   PAIN: Pt will report worst pain of 1/10 in order to progress toward max functional ability and improve quality of life [x] Progressing  [] Met  [] Not Met     FUNCTION: Patient will demonstrate improved function as indicated by an increase of 20 points on FOTO [x] Progressing  [] Met  [] Not Met     MOBILITY: Patient will improve ankle AROM to WFL in order to return to maximal functional potential and improve quality of life.  [x] Progressing  [] Met  [] Not Met     STRENGTH: Patient  will improve strength to 4+/5 without pain in order to improve functional independence and quality of life. [x] Progressing  [] Met  [] Not Met     GAIT: Patient will demonstrate normalized gait mechanics with minimal compensation in order to return to PLOF. [x] Progressing  [] Met  [] Not Met     Patient will return to normal ADL's, IADL's, community involvement, recreational activities, and work-related activities with less than or equal to 1/10 pain and maximal function.  [x] Progressing  [] Met  [] Not Met           PLAN   Plan of care Certification: 6/11/2024 to 9/6/2024     Outpatient Physical Therapy 2 times weekly for 12 weeks to include any combination of the following interventions: virtual visits, dry needling, modalities, electrical stimulation (IFC, Pre-Mod, Attended with Functional Dry Needling), Electrical Stimulation  , Gait Training, Manual Therapy, Moist Heat/ Ice, Neuromuscular Re-ed, Patient Education, Self Care, Therapeutic Exercise, Functional Training, and Therapeutic Activites     Hoa Garnett, PT

## 2024-07-05 ENCOUNTER — CLINICAL SUPPORT (OUTPATIENT)
Dept: REHABILITATION | Facility: HOSPITAL | Age: 34
End: 2024-07-05
Payer: MEDICAID

## 2024-07-05 DIAGNOSIS — M25.671 DECREASED RANGE OF MOTION OF RIGHT ANKLE: Primary | ICD-10-CM

## 2024-07-05 DIAGNOSIS — R26.9 GAIT ABNORMALITY: ICD-10-CM

## 2024-07-05 PROCEDURE — 97110 THERAPEUTIC EXERCISES: CPT | Mod: PN,CQ

## 2024-07-05 NOTE — PROGRESS NOTES
"[facility]   Physical Therapy Treatment Note      Name: Esther Membreno [facility]  Clinic Number: [id number]    Therapy Diagnosis:   Encounter Diagnoses   Name Primary?    Decreased range of motion of right ankle Yes    Gait abnormality      Physician: Donell Cruz PA-C    Visit Date: 7/5/2024    Physician Orders: PT Eval and Treat   Medical Diagnosis from Referral: Closed fracture dislocation of right ankle joint, with routine healing, subsequent encounter [G51.480H]   Evaluation Date: 6/11/2024  Authorization Period Expiration: 6/10/25  Plan of Care Expiration: 9/6/24  Progress Note Due: 7/11/24  Visit # / Visits authorized: 1/1 eval; 2/20  FOTO: 1/3 (last performed 6/11/2024)     PRECAUTIONS: Standard      MD Follow up: 7/22/24     Date of Surgery  4/27/24   Surgery Performed  Open reduction internal fixation right ankle pilon fracture fixation both distal tibia and fibula   Post-Op Precautions WBAT with CAM boot       PTA Visit #: 1/5     Time In: 11:30am  Time Out: 12:30pm  Total Billable Time: 29 minutes    Subjective     Patient reports: today is the first day she has gone with the boot without the crutches following last treatment. Patient reports continuing to wear boot regularly and has not had exacerbations of pain mostly just feeling dull ache.     Esther was compliant with home exercise program.  Response to previous treatment: good  Functional change: n/a    Pain: 2/10  Location: right ankles     Objective      Objective Measures updated at progress report unless specified.     Treatment     Esther received the treatments listed below:      CPT Intervention  JointFocus Duration / Intensity  7/2   MT STM MT mobs TE Nustep  6 min res 4.5    TE stretches c strap  3x30"  Gastroc  Soleus  Prone quad   TE [place] Pickups  2 x 1.5 minutes   TE BAPS up/down, side/side, cw/ccw 2x10 ea    TE 4 way ankle  2x10 ea, BTB    NMR  Seated heel raises 2x10, 5lb above knee      Shuttle  3x10, 3 band "   SLP  Single heel raises   TE Standing 3 way hip  x10     Sit to stand with CAM boot donned     TE Seated ankle ABC 1#  2 sets    TE Seated heel slides  2x 10   NMR  Seated toe splaying     NMR  Seated Heel Raises     NMR  Seated Toe Yoga     NMR  Short Foot     TE Sidelying ankle inversion 2#     TE Sidelying ankle eversion 2#     TE Seated ankle DF 2#      PLAN          CPT Codes available for Billing:   (-) minutes of Manual therapy (MT) to improve pain and ROM.  (12) minutes of Therapeutic Exercise (TE) to develop strength, endurance, range of motion, and flexibility.  (17) minutes of Neuromuscular Re-Education (NMR)  to improve: Balance, Coordination, Kinesthetic, Sense, Proprioception, and Posture.  (-) minutes of Therapeutic Activities (TA) to improve functional performance.  (-) minutes of Gait Training (GT) to improve functional mobility and safety.  Unattended Electrical Stimulation (ES) for muscle performance or pain modulation.  Vasopneumatic Device Therapy () for management of swelling/edema. (76147)  BFR: Blood flow restriction applied during exercise  NP or (-): Not Performed    Patient Education and Home Exercises       Education provided:       Written Home Exercises Provided: Patient instructed to cont prior HEP. Exercises were reviewed and Esther was able to demonstrate them prior to the end of the session.  Esther demonstrated good  understanding of the education provided. See Electronic Medical Record under Patient Instructions for exercises provided during therapy sessions    Assessment     Continued work of previous visit with inclusion of weightbearing activities. Patient initially demonstrated difficulty properly completing shuttle single leg heel raises that required cueing for proper completion. Patient was able to complete all prescribed activities without limitations of pain being present. Patient demonstrated improvements completing exercises compared to last visit with greater  comfort during ankle Range of Motion exercises. Plan to continue progression of treatment further into weightbearing under current restrictions as tolerated.     Esther Is progressing well towards Esther's goals.   Patient prognosis is Good.     Patient will continue to benefit from skilled outpatient physical therapy to address the deficits listed in the problem list box on initial evaluation, provide pt/family education and to maximize pt's level of independence in the home and community environment.     Patient's spiritual, cultural and educational needs considered and pt agreeable to plan of care and goals.     Anticipated barriers to physical therapy: transportation    Goals:   Short Term Goals:  6 weeks Status  Date Met   PAIN: Pt will report worst pain of 5/10 in order to progress toward max functional ability and improve quality of life. [x] Progressing  [] Met  [] Not Met     FUNCTION: Patient will improve functional outcome test score by 10 points  [x] Progressing  [] Met  [] Not Met     MOBILITY: Patient will improve ankle AROM by 15 percent in order to progress towards independence with functional activities.  [x] Progressing  [] Met  [] Not Met     STRENGTH: Patient will improve strength by 1 grade  in order to progress towards independence with functional activities. [x] Progressing  [] Met  [] Not Met     POSTURE: Patient will correct postural deviations in sitting and standing, to decrease pain and promote long term stability.  [x] Progressing  [] Met  [] Not Met     GAIT: Patient will demonstrate improved gait mechanics including least restricted device in order to improve functional mobility, improve quality of life, and decrease risk of further injury or fall.  [x] Progressing  [] Met  [] Not Met     HEP: Patient will demonstrate independence with HEP in order to progress toward functional independence. [x] Progressing  [] Met  [] Not Met        Long Term Goals:  12 weeks Status Date Met   PAIN:  Pt will report worst pain of 1/10 in order to progress toward max functional ability and improve quality of life [x] Progressing  [] Met  [] Not Met     FUNCTION: Patient will demonstrate improved function as indicated by an increase of 20 points on FOTO [x] Progressing  [] Met  [] Not Met     MOBILITY: Patient will improve ankle AROM to WFL in order to return to maximal functional potential and improve quality of life.  [x] Progressing  [] Met  [] Not Met     STRENGTH: Patient will improve strength to 4+/5 without pain in order to improve functional independence and quality of life. [x] Progressing  [] Met  [] Not Met     GAIT: Patient will demonstrate normalized gait mechanics with minimal compensation in order to return to PLOF. [x] Progressing  [] Met  [] Not Met     Patient will return to normal ADL's, IADL's, community involvement, recreational activities, and work-related activities with less than or equal to 1/10 pain and maximal function.  [x] Progressing  [] Met  [] Not Met           PLAN   Plan of care Certification: 6/11/2024 to 9/6/2024     Outpatient Physical Therapy 2 times weekly for 12 weeks to include any combination of the following interventions: virtual visits, dry needling, modalities, electrical stimulation (IFC, Pre-Mod, Attended with Functional Dry Needling), Electrical Stimulation  , Gait Training, Manual Therapy, Moist Heat/ Ice, Neuromuscular Re-ed, Patient Education, Self Care, Therapeutic Exercise, Functional Training, and Therapeutic Activites     Alvaro Mancia PTA

## 2024-07-09 ENCOUNTER — CLINICAL SUPPORT (OUTPATIENT)
Dept: REHABILITATION | Facility: HOSPITAL | Age: 34
End: 2024-07-09
Payer: MEDICAID

## 2024-07-09 DIAGNOSIS — M25.671 DECREASED RANGE OF MOTION OF RIGHT ANKLE: Primary | ICD-10-CM

## 2024-07-09 DIAGNOSIS — R26.9 GAIT ABNORMALITY: ICD-10-CM

## 2024-07-09 PROCEDURE — 97110 THERAPEUTIC EXERCISES: CPT | Mod: PN,CQ

## 2024-07-09 NOTE — PROGRESS NOTES
"[facility]   Physical Therapy Treatment Note      Name: Esther Membreno [facility]  Clinic Number: [id number]    Therapy Diagnosis:   Encounter Diagnoses   Name Primary?    Decreased range of motion of right ankle Yes    Gait abnormality      Physician: Donell Cruz PA-C    Visit Date: 7/9/2024    Physician Orders: PT Eval and Treat   Medical Diagnosis from Referral: Closed fracture dislocation of right ankle joint, with routine healing, subsequent encounter [K35.669D]   Evaluation Date: 6/11/2024  Authorization Period Expiration: 6/10/25  Plan of Care Expiration: 9/6/24  Progress Note Due: 7/11/24  Visit # / Visits authorized: 1/1 eval;8/20  FOTO: 1/3 (last performed 6/11/2024) **PERFORM NEXT**     PRECAUTIONS: Standard      MD Follow up: 7/22/24     Date of Surgery  4/27/24   Surgery Performed  Open reduction internal fixation right ankle pilon fracture fixation both distal tibia and fibula   Post-Op Precautions WBAT with CAM boot       PTA Visit #: 2/5     Time In: 11:04am  Time Out: 12:08pm  Total Billable Time: 31 minutes    Subjective     Patient reports: today is the first day she has gone with the boot without the crutches following last treatment. Patient reports continuing to wear boot regularly and has not had exacerbations of pain mostly just feeling dull ache.     Esther was compliant with home exercise program.  Response to previous treatment: good  Functional change: n/a    Pain: 2/10  Location: right ankles     Objective      Objective Measures updated at progress report unless specified.     Treatment     Esther received the treatments listed below:      CPT Intervention  JointFocus Duration / Intensity  7/9   MT STM MT mobs TE Nustep  6 min res 4.5    TE stretches c strap  3x30"  Gastroc  Soleus  Prone quad   TE [place] Pickups  2 x 1.5 minutes   TE BAPS up/down, side/side, cw/ccw 2x10 ea    TE 4 way ankle  3x10 ea, BTB    NMR  Seated heel raises 3x10, 7.5lb above knee    TE " Shuttle  3x10, 3 band   SLP  Single heel raises   TE Standing 3 way hip  x10   NMR Standing marches in // bars 2 x10     Sit to stand with CAM boot donned     TE Seated ankle ABC 1#  2 sets   TE Seated heel slides  3x 10   NMR Short Foot     NMR Toe Yoga     TE Seated ankle DF 2#      PLAN          CPT Codes available for Billing:   (-) minutes of Manual therapy (MT) to improve pain and ROM.  (13) minutes of Therapeutic Exercise (TE) to develop strength, endurance, range of motion, and flexibility.  (18) minutes of Neuromuscular Re-Education (NMR)  to improve: Balance, Coordination, Kinesthetic, Sense, Proprioception, and Posture.  (-) minutes of Therapeutic Activities (TA) to improve functional performance.  (-) minutes of Gait Training (GT) to improve functional mobility and safety.  Unattended Electrical Stimulation (ES) for muscle performance or pain modulation.  Vasopneumatic Device Therapy () for management of swelling/edema. (03268)  BFR: Blood flow restriction applied during exercise  NP or (-): Not Performed    Patient Education and Home Exercises       Education provided:       Written Home Exercises Provided: Patient instructed to cont prior HEP. Exercises were reviewed and Esther was able to demonstrate them prior to the end of the session.  Esther demonstrated good  understanding of the education provided. See Electronic Medical Record under Patient Instructions for exercises provided during therapy sessions    Assessment     Continued focus of treatment attempting to improve ankle mobility and strength within current weightbearing restrictions as tolerated. Patient demonstrates improvements with ankle Range of Motion activities often complete exercises more quickly. Cued patient to increase hold durations and focus on eccentric return to neutral. Patient would benefit from continued strengthening activities progressing further into function as tolerated.    Esther Is progressing well towards Esther's  goals.   Patient prognosis is Good.     Patient will continue to benefit from skilled outpatient physical therapy to address the deficits listed in the problem list box on initial evaluation, provide pt/family education and to maximize pt's level of independence in the home and community environment.     Patient's spiritual, cultural and educational needs considered and pt agreeable to plan of care and goals.     Anticipated barriers to physical therapy: transportation    Goals:   Short Term Goals:  6 weeks Status  Date Met   PAIN: Pt will report worst pain of 5/10 in order to progress toward max functional ability and improve quality of life. [x] Progressing  [] Met  [] Not Met     FUNCTION: Patient will improve functional outcome test score by 10 points  [x] Progressing  [] Met  [] Not Met     MOBILITY: Patient will improve ankle AROM by 15 percent in order to progress towards independence with functional activities.  [x] Progressing  [] Met  [] Not Met     STRENGTH: Patient will improve strength by 1 grade  in order to progress towards independence with functional activities. [x] Progressing  [] Met  [] Not Met     POSTURE: Patient will correct postural deviations in sitting and standing, to decrease pain and promote long term stability.  [x] Progressing  [] Met  [] Not Met     GAIT: Patient will demonstrate improved gait mechanics including least restricted device in order to improve functional mobility, improve quality of life, and decrease risk of further injury or fall.  [x] Progressing  [] Met  [] Not Met     HEP: Patient will demonstrate independence with HEP in order to progress toward functional independence. [x] Progressing  [] Met  [] Not Met        Long Term Goals:  12 weeks Status Date Met   PAIN: Pt will report worst pain of 1/10 in order to progress toward max functional ability and improve quality of life [x] Progressing  [] Met  [] Not Met     FUNCTION: Patient will demonstrate improved function  as indicated by an increase of 20 points on FOTO [x] Progressing  [] Met  [] Not Met     MOBILITY: Patient will improve ankle AROM to WFL in order to return to maximal functional potential and improve quality of life.  [x] Progressing  [] Met  [] Not Met     STRENGTH: Patient will improve strength to 4+/5 without pain in order to improve functional independence and quality of life. [x] Progressing  [] Met  [] Not Met     GAIT: Patient will demonstrate normalized gait mechanics with minimal compensation in order to return to PLOF. [x] Progressing  [] Met  [] Not Met     Patient will return to normal ADL's, IADL's, community involvement, recreational activities, and work-related activities with less than or equal to 1/10 pain and maximal function.  [x] Progressing  [] Met  [] Not Met           PLAN   Plan of care Certification: 6/11/2024 to 9/6/2024     Outpatient Physical Therapy 2 times weekly for 12 weeks to include any combination of the following interventions: virtual visits, dry needling, modalities, electrical stimulation (IFC, Pre-Mod, Attended with Functional Dry Needling), Electrical Stimulation  , Gait Training, Manual Therapy, Moist Heat/ Ice, Neuromuscular Re-ed, Patient Education, Self Care, Therapeutic Exercise, Functional Training, and Therapeutic Activites     Alvaro Mancia, SOL

## 2024-07-11 ENCOUNTER — CLINICAL SUPPORT (OUTPATIENT)
Dept: REHABILITATION | Facility: HOSPITAL | Age: 34
End: 2024-07-11
Payer: MEDICAID

## 2024-07-11 DIAGNOSIS — M25.671 DECREASED RANGE OF MOTION OF RIGHT ANKLE: Primary | ICD-10-CM

## 2024-07-11 DIAGNOSIS — R26.9 GAIT ABNORMALITY: ICD-10-CM

## 2024-07-11 PROCEDURE — 97110 THERAPEUTIC EXERCISES: CPT | Mod: PN,CQ

## 2024-07-11 NOTE — PROGRESS NOTES
"[facility]   Physical Therapy Treatment Note      Name: Esther Membreno [facility]  Clinic Number: [id number]    Therapy Diagnosis:   Encounter Diagnoses   Name Primary?    Decreased range of motion of right ankle Yes    Gait abnormality      Physician: Donell Cruz PA-C    Visit Date: 7/11/2024    Physician Orders: PT Eval and Treat   Medical Diagnosis from Referral: Closed fracture dislocation of right ankle joint, with routine healing, subsequent encounter [A19.001N]   Evaluation Date: 6/11/2024  Authorization Period Expiration: 6/10/25  Plan of Care Expiration: 9/6/24  Progress Note Due: 7/11/24  Visit # / Visits authorized: 1/1 eval;9/20  FOTO: 2/3 (last performed 7/11/2024)   PRECAUTIONS: Standard      MD Follow up: 7/22/24     Date of Surgery  4/27/24   Surgery Performed  Open reduction internal fixation right ankle pilon fracture fixation both distal tibia and fibula   Post-Op Precautions WBAT with CAM boot       PTA Visit #: 3/5     Time In: 11:01am  Time Out: 12:04pm  Total Billable Time: 30 minutes    Subjective     Patient reports: since last visit attempting to be normal walking around more in the boot. Patient reports a minor to moderate increase in heel and anteromedial ankle pain yesterday that decreased by this morning back down.  Esther was compliant with home exercise program.  Response to previous treatment: good workout  Functional change: n/a    Pain: 2/10  Location: right ankle    Objective      Objective Measures updated at progress report unless specified.     Treatment     Esther received the treatments listed below:      CPT Intervention  JointFocus Duration / Intensity  7/11   MT MT TE Nustep  6 min res 4.5    TE stretches c strap  3x30"  Gastroc  Soleus  Prone quad   TE [place] Pickups  2 x 1.5 minutes   TE BAPS up/down, side/side, cw/ccw 2x10 ea    TE 4 way ankle  3x10 ea, BTB    NMR  Seated heel raises 3x10, 7.5lb above knee    TE Shuttle  3x10, 3 band "   SLP  Single heel raises   TE Standing 3 way hip  x10   NMR Standing marches in // bars 2 x10     Sit to stand with CAM boot donned     TE Seated ankle ABC 1#  2 sets   TE Seated heel slides to heel and to toes  2x 10 each   NMR Short Foot     NMR Toe Yoga     TE Seated ankle DF 2#      PLAN          CPT Codes available for Billing:   (-) minutes of Manual therapy (MT) to improve pain and ROM.  (13) minutes of Therapeutic Exercise (TE) to develop strength, endurance, range of motion, and flexibility.  (17) minutes of Neuromuscular Re-Education (NMR)  to improve: Balance, Coordination, Kinesthetic, Sense, Proprioception, and Posture.  (-) minutes of Therapeutic Activities (TA) to improve functional performance.  (-) minutes of Gait Training (GT) to improve functional mobility and safety.  Unattended Electrical Stimulation (ES) for muscle performance or pain modulation.  Vasopneumatic Device Therapy () for management of swelling/edema. (84879)  BFR: Blood flow restriction applied during exercise  NP or (-): Not Performed    Patient Education and Home Exercises       Education provided:       Written Home Exercises Provided: Patient instructed to cont prior HEP. Exercises were reviewed and Esther was able to demonstrate them prior to the end of the session.  Esther demonstrated good  understanding of the education provided. See Electronic Medical Record under Patient Instructions for exercises provided during therapy sessions    Assessment     Patient demonstrated motivation to participate with therapy as much as they were able to tolerate today without exacerbations being present. Shuttle heel raises was partially modified with one set being completed with bilateral lower extremities with patient able to complete a second set on single lower extremity. Patient would benefit from continued strengthening and mobility activities progressing further into function and outside of CAM boot when restrictions are lifted MD.      Esther Is progressing well towards Esther's goals.   Patient prognosis is Good.     Patient will continue to benefit from skilled outpatient physical therapy to address the deficits listed in the problem list box on initial evaluation, provide pt/family education and to maximize pt's level of independence in the home and community environment.     Patient's spiritual, cultural and educational needs considered and pt agreeable to plan of care and goals.     Anticipated barriers to physical therapy: transportation    Goals:   Short Term Goals:  6 weeks Status  Date Met   PAIN: Pt will report worst pain of 5/10 in order to progress toward max functional ability and improve quality of life. [x] Progressing  [] Met  [] Not Met     FUNCTION: Patient will improve functional outcome test score by 10 points  [x] Progressing  [] Met  [] Not Met     MOBILITY: Patient will improve ankle AROM by 15 percent in order to progress towards independence with functional activities.  [x] Progressing  [] Met  [] Not Met     STRENGTH: Patient will improve strength by 1 grade  in order to progress towards independence with functional activities. [x] Progressing  [] Met  [] Not Met     POSTURE: Patient will correct postural deviations in sitting and standing, to decrease pain and promote long term stability.  [x] Progressing  [] Met  [] Not Met     GAIT: Patient will demonstrate improved gait mechanics including least restricted device in order to improve functional mobility, improve quality of life, and decrease risk of further injury or fall.  [x] Progressing  [] Met  [] Not Met     HEP: Patient will demonstrate independence with HEP in order to progress toward functional independence. [x] Progressing  [] Met  [] Not Met        Long Term Goals:  12 weeks Status Date Met   PAIN: Pt will report worst pain of 1/10 in order to progress toward max functional ability and improve quality of life [x] Progressing  [] Met  [] Not Met      FUNCTION: Patient will demonstrate improved function as indicated by an increase of 20 points on FOTO [x] Progressing  [] Met  [] Not Met     MOBILITY: Patient will improve ankle AROM to WFL in order to return to maximal functional potential and improve quality of life.  [x] Progressing  [] Met  [] Not Met     STRENGTH: Patient will improve strength to 4+/5 without pain in order to improve functional independence and quality of life. [x] Progressing  [] Met  [] Not Met     GAIT: Patient will demonstrate normalized gait mechanics with minimal compensation in order to return to PLOF. [x] Progressing  [] Met  [] Not Met     Patient will return to normal ADL's, IADL's, community involvement, recreational activities, and work-related activities with less than or equal to 1/10 pain and maximal function.  [x] Progressing  [] Met  [] Not Met           PLAN   Plan of care Certification: 6/11/2024 to 9/6/2024     Outpatient Physical Therapy 2 times weekly for 12 weeks to include any combination of the following interventions: virtual visits, dry needling, modalities, electrical stimulation (IFC, Pre-Mod, Attended with Functional Dry Needling), Electrical Stimulation  , Gait Training, Manual Therapy, Moist Heat/ Ice, Neuromuscular Re-ed, Patient Education, Self Care, Therapeutic Exercise, Functional Training, and Therapeutic Activites     Alvaro Mancia, SOL

## 2024-07-16 ENCOUNTER — CLINICAL SUPPORT (OUTPATIENT)
Dept: REHABILITATION | Facility: HOSPITAL | Age: 34
End: 2024-07-16
Payer: MEDICAID

## 2024-07-16 DIAGNOSIS — M25.671 DECREASED RANGE OF MOTION OF RIGHT ANKLE: Primary | ICD-10-CM

## 2024-07-16 DIAGNOSIS — R26.9 GAIT ABNORMALITY: ICD-10-CM

## 2024-07-16 PROCEDURE — 97110 THERAPEUTIC EXERCISES: CPT | Mod: PN

## 2024-07-16 NOTE — PROGRESS NOTES
[facility]   Physical Therapy Treatment Note      Name: Esther Membreno [facility]  Clinic Number: [id number]    Therapy Diagnosis:   Encounter Diagnoses   Name Primary?    Decreased range of motion of right ankle Yes    Gait abnormality      Physician: Donell Cruz PA-C    Visit Date: 7/16/2024    Physician Orders: PT Eval and Treat   Medical Diagnosis from Referral: Closed fracture dislocation of right ankle joint, with routine healing, subsequent encounter [D58.464D]   Evaluation Date: 6/11/2024  Authorization Period Expiration: 6/10/25  Plan of Care Expiration: 9/6/24  Progress Note Due: 7/11/24  Visit # / Visits authorized: 1/12 +eval  FOTO: 2/3 (last performed 7/11/2024)   PRECAUTIONS: Standard      MD Follow up: 7/22/24     Date of Surgery  4/27/24   Surgery Performed  Open reduction internal fixation right ankle pilon fracture fixation both distal tibia and fibula   Post-Op Precautions WBAT with CAM boot       PTA Visit #: 3/5     Time In: 11:00 am  Time Out: 12:00 pm  Total Billable Time: 60 minutes    Subjective     Patient reports: achiness to right ankle the last two days, however, is feeling better today.   Esther was compliant with home exercise program.  Response to previous treatment: good workout  Functional change: n/a    Pain: 2/10  Location: right ankle    Objective      Objective Measures updated at progress report unless specified.      updated 7/16/24    Gait = amb with CAM boot donned to right foot       Ankle/Foot AROM/PROM Right Left Notes   Dorsiflexion (20º) 0  5/10     Plantarflexion (50º) 60  80     Inversion (35º) 45 45     Eversion (15º) 20 35          Treatment     Esther received the treatments listed below:      CPT Intervention  JointFocus Duration / Intensity  7/16   TE Nustep  6 min recumbent bike   TE stretches c strap  3x30   Gastric  Soleus  Prone quad   TE [place] Pickups X2   TE BAPS up/down, side/side, cw/ccw 2x10ea   TE BFR (80% PTP; total occlusion  12 min)  Seated heel raises  Planter flexion, BTB      CPT Codes available for Billing:   (-) minutes of Manual therapy (MT) to improve pain and ROM.  (60) minutes of Therapeutic Exercise (TE) to develop strength, endurance, range of motion, and flexibility.  (-) minutes of Neuromuscular Re-Education (NMR)  to improve: Balance, Coordination, Kinesthetic, Sense, Proprioception, and Posture.  (-) minutes of Therapeutic Activities (TA) to improve functional performance.  (-) minutes of Gait Training (GT) to improve functional mobility and safety.  Unattended Electrical Stimulation (ES) for muscle performance or pain modulation.  Vasopneumatic Device Therapy () for management of swelling/edema. (15621)  BFR: Blood flow restriction applied during exercise  NP or (-): Not Performed    Patient Education and Home Exercises       Education provided:       Written Home Exercises Provided: Patient instructed to cont prior HEP. Exercises were reviewed and Esther was able to demonstrate them prior to the end of the session.  Esther demonstrated good  understanding of the education provided. See Electronic Medical Record under Patient Instructions for exercises provided during therapy sessions    Assessment     Incorporated blood flow restriction to improve gastroc strengthening with low load activities. Pt with good tolerance to today's session. Assessed ankle ROM; pt demonstrates improvement in all directions except ankle eversion. Pt is scheduled for a f/u with ORTHO on 7/22.     Esther Is progressing well towards Esther's goals.   Patient prognosis is Good.     Patient will continue to benefit from skilled outpatient physical therapy to address the deficits listed in the problem list box on initial evaluation, provide pt/family education and to maximize pt's level of independence in the home and community environment.     Patient's spiritual, cultural and educational needs considered and pt agreeable to plan of care and  goals.     Anticipated barriers to physical therapy: transportation    Goals:   Short Term Goals:  6 weeks Status  Date Met   PAIN: Pt will report worst pain of 5/10 in order to progress toward max functional ability and improve quality of life. [x] Progressing  [] Met  [] Not Met     FUNCTION: Patient will improve functional outcome test score by 10 points  [x] Progressing  [] Met  [] Not Met     MOBILITY: Patient will improve ankle AROM by 15 percent in order to progress towards independence with functional activities.  [x] Progressing  [] Met  [] Not Met     STRENGTH: Patient will improve strength by 1 grade  in order to progress towards independence with functional activities. [x] Progressing  [] Met  [] Not Met     POSTURE: Patient will correct postural deviations in sitting and standing, to decrease pain and promote long term stability.  [x] Progressing  [] Met  [] Not Met     GAIT: Patient will demonstrate improved gait mechanics including least restricted device in order to improve functional mobility, improve quality of life, and decrease risk of further injury or fall.  [x] Progressing  [] Met  [] Not Met     HEP: Patient will demonstrate independence with HEP in order to progress toward functional independence. [x] Progressing  [] Met  [] Not Met        Long Term Goals:  12 weeks Status Date Met   PAIN: Pt will report worst pain of 1/10 in order to progress toward max functional ability and improve quality of life [x] Progressing  [] Met  [] Not Met     FUNCTION: Patient will demonstrate improved function as indicated by an increase of 20 points on FOTO [x] Progressing  [] Met  [] Not Met     MOBILITY: Patient will improve ankle AROM to WFL in order to return to maximal functional potential and improve quality of life.  [x] Progressing  [] Met  [] Not Met     STRENGTH: Patient will improve strength to 4+/5 without pain in order to improve functional independence and quality of life. [x] Progressing  []  Met  [] Not Met     GAIT: Patient will demonstrate normalized gait mechanics with minimal compensation in order to return to PLOF. [x] Progressing  [] Met  [] Not Met     Patient will return to normal ADL's, IADL's, community involvement, recreational activities, and work-related activities with less than or equal to 1/10 pain and maximal function.  [x] Progressing  [] Met  [] Not Met           PLAN   Plan of care Certification: 6/11/2024 to 9/6/2024     Outpatient Physical Therapy 2 times weekly for 12 weeks to include any combination of the following interventions: virtual visits, dry needling, modalities, electrical stimulation (IFC, Pre-Mod, Attended with Functional Dry Needling), Electrical Stimulation  , Gait Training, Manual Therapy, Moist Heat/ Ice, Neuromuscular Re-ed, Patient Education, Self Care, Therapeutic Exercise, Functional Training, and Therapeutic Activites     Hoa Garnett, PT

## 2024-07-22 ENCOUNTER — HOSPITAL ENCOUNTER (OUTPATIENT)
Dept: RADIOLOGY | Facility: HOSPITAL | Age: 34
Discharge: HOME | End: 2024-07-22
Attending: PHYSICIAN ASSISTANT
Payer: MEDICAID

## 2024-07-22 ENCOUNTER — PATIENT MESSAGE (OUTPATIENT)
Dept: FAMILY MEDICINE | Facility: CLINIC | Age: 34
End: 2024-07-22
Payer: MEDICAID

## 2024-07-22 ENCOUNTER — OFFICE VISIT (OUTPATIENT)
Dept: ORTHOPEDICS | Facility: CLINIC | Age: 34
End: 2024-07-22
Payer: MEDICAID

## 2024-07-22 VITALS
SYSTOLIC BLOOD PRESSURE: 127 MMHG | HEART RATE: 98 BPM | BODY MASS INDEX: 33.43 KG/M2 | WEIGHT: 181.69 LBS | DIASTOLIC BLOOD PRESSURE: 88 MMHG | HEIGHT: 62 IN

## 2024-07-22 DIAGNOSIS — M25.571 RIGHT ANKLE PAIN, UNSPECIFIED CHRONICITY: ICD-10-CM

## 2024-07-22 DIAGNOSIS — M25.571 RIGHT ANKLE PAIN, UNSPECIFIED CHRONICITY: Primary | ICD-10-CM

## 2024-07-22 DIAGNOSIS — S82.891D: Primary | ICD-10-CM

## 2024-07-22 PROCEDURE — 99214 OFFICE O/P EST MOD 30 MIN: CPT | Mod: PBBFAC,25 | Performed by: PHYSICIAN ASSISTANT

## 2024-07-22 PROCEDURE — 73610 X-RAY EXAM OF ANKLE: CPT | Mod: TC,RT

## 2024-07-22 PROCEDURE — 4010F ACE/ARB THERAPY RXD/TAKEN: CPT | Mod: CPTII,,, | Performed by: PHYSICIAN ASSISTANT

## 2024-07-22 PROCEDURE — 3079F DIAST BP 80-89 MM HG: CPT | Mod: CPTII,,, | Performed by: PHYSICIAN ASSISTANT

## 2024-07-22 PROCEDURE — 99999 PR PBB SHADOW E&M-EST. PATIENT-LVL IV: CPT | Mod: PBBFAC,,, | Performed by: PHYSICIAN ASSISTANT

## 2024-07-22 PROCEDURE — 1159F MED LIST DOCD IN RCRD: CPT | Mod: CPTII,,, | Performed by: PHYSICIAN ASSISTANT

## 2024-07-22 PROCEDURE — 3074F SYST BP LT 130 MM HG: CPT | Mod: CPTII,,, | Performed by: PHYSICIAN ASSISTANT

## 2024-07-22 PROCEDURE — 73610 X-RAY EXAM OF ANKLE: CPT | Mod: 26,RT,, | Performed by: RADIOLOGY

## 2024-07-22 PROCEDURE — 1160F RVW MEDS BY RX/DR IN RCRD: CPT | Mod: CPTII,,, | Performed by: PHYSICIAN ASSISTANT

## 2024-07-22 PROCEDURE — 99024 POSTOP FOLLOW-UP VISIT: CPT | Mod: ,,, | Performed by: PHYSICIAN ASSISTANT

## 2024-07-22 RX ORDER — TRAZODONE HYDROCHLORIDE 50 MG/1
50 TABLET ORAL NIGHTLY
Qty: 30 TABLET | Refills: 3 | Status: SHIPPED | OUTPATIENT
Start: 2024-07-22 | End: 2024-07-23 | Stop reason: SDUPTHER

## 2024-07-22 RX ORDER — HYDROXYZINE HYDROCHLORIDE 25 MG/1
25 TABLET, FILM COATED ORAL 3 TIMES DAILY PRN
Qty: 90 TABLET | Refills: 1 | Status: SHIPPED | OUTPATIENT
Start: 2024-07-22

## 2024-07-22 NOTE — PROGRESS NOTES
"Subjective:      Patient ID: Esther Romo is a 34 y.o. adult.    Chief Complaint: Pain of the Right Ankle    HPI: Esther Romo is a 34 y.o. adult in clinic today for postoperative follow-up.  Patient is 12 weeks status post ORIF of right ankle pilon fracture with fixation of both distal tibia and fibula.  Patient was last seen in this clinic on 06/10/2024.  She states that she has been performing some weight-bearing and has not had any increase in pain.  She is going to physical therapy and is doing very well with that.    Past Medical History:   Diagnosis Date    Alcohol use, unspecified with unspecified alcohol-induced disorder     Anxiety     Hypertension     Miscarriage        Current Outpatient Medications:     aspirin (ECOTRIN) 81 MG EC tablet, Take 1 tablet (81 mg total) by mouth 2 (two) times a day., Disp: , Rfl:     busPIRone (BUSPAR) 10 MG tablet, Take 1 tablet (10 mg total) by mouth 2 (two) times daily., Disp: 180 tablet, Rfl: 2    hydrOXYzine (ATARAX) 25 MG tablet, Take 1 tablet (25 mg total) by mouth 3 (three) times daily as needed for Itching., Disp: 90 tablet, Rfl: 1    lisinopriL-hydrochlorothiazide (PRINZIDE,ZESTORETIC) 20-12.5 mg per tablet, Take 1 tablet by mouth every morning., Disp: 90 tablet, Rfl: 3    multivit-minerals/folic acid (WOMEN'S MULTIVITAMIN GUMMIES ORAL), Take by mouth once daily., Disp: , Rfl:     traZODone (DESYREL) 50 MG tablet, Take 1 tablet (50 mg total) by mouth every evening., Disp: 30 tablet, Rfl: 0    venlafaxine (EFFEXOR-XR) 150 MG Cp24, Take 1 capsule (150 mg total) by mouth once daily., Disp: 90 capsule, Rfl: 3  Review of patient's allergies indicates:  No Known Allergies    /88 (BP Location: Left arm, Patient Position: Sitting, BP Method: Large (Automatic))   Pulse 98   Ht 5' 2" (1.575 m)   Wt 82.4 kg (181 lb 10.5 oz)   BMI 33.23 kg/m²     ROS      Objective:    Ortho Exam   Right ankle:   Fracture boot was removed for physical exam  Incisions are well " healed, no signs of infection  No TTP   Mild edema diffusely   ROM decreased, mostly with eversion   Calf and compartments are soft and compressible   Motor exam normal   Sensation and pulses intact   Cap refill brisk    GEN: Well developed, well nourished adult. AAOX3. No acute distress.   Head: Normocephalic, atraumatic.   Eyes: CLEMENTE  Neck: Trachea is midline, no adenopathy  Resp: Breathing unlabored.  Neuro: Motor function normal, Cranial nerves intact  Psych: Mood and affect appropriate.       Assessment:     Imaging:  X-ray right ankle obtained today shows hardware in satisfactory alignment no signs of failure.  Fractures are well healed.  No detrimental changes.  Ankle mortise remains intact.        1. Closed fracture dislocation of right ankle joint, with routine healing, subsequent encounter          Plan:       Reviewed the radiographs with the patient.  Encouraged her on the progress she has made so far.  She should continue to increase all activities as tolerated.  Recommended transition from fracture boot into lace-up ankle brace at this time.  She may begin to weightbear as tolerated out of the fracture boot.  Orders for continuing therapy were placed.  We will see the patient back in clinic in about 6 weeks for repeat radiographs and further evaluation.    Orders Placed This Encounter    Ambulatory referral/consult to Physical/Occupational Therapy     Follow up in about 6 weeks (around 9/2/2024).          Patient note was created using MModal Dictation.  Any errors in syntax or even information may not have been identified and edited on initial review prior to signing this note.

## 2024-07-22 NOTE — TELEPHONE ENCOUNTER
No care due was identified.  [facility] Embedded Care Due Messages. Reference number: [id number].   7/22/2024 12:33:05 PM CDT

## 2024-07-23 RX ORDER — TRAZODONE HYDROCHLORIDE 50 MG/1
50 TABLET ORAL NIGHTLY
Qty: 30 TABLET | Refills: 3 | Status: SHIPPED | OUTPATIENT
Start: 2024-07-23 | End: 2024-11-20

## 2024-07-25 ENCOUNTER — CLINICAL SUPPORT (OUTPATIENT)
Dept: REHABILITATION | Facility: HOSPITAL | Age: 34
End: 2024-07-25
Attending: PHYSICIAN ASSISTANT
Payer: MEDICAID

## 2024-07-25 DIAGNOSIS — M25.671 DECREASED RANGE OF MOTION OF RIGHT ANKLE: Primary | ICD-10-CM

## 2024-07-25 DIAGNOSIS — S82.891D: ICD-10-CM

## 2024-07-25 DIAGNOSIS — R26.9 GAIT ABNORMALITY: ICD-10-CM

## 2024-07-25 PROCEDURE — 97110 THERAPEUTIC EXERCISES: CPT | Mod: PN

## 2024-07-25 NOTE — PROGRESS NOTES
[facility]   Physical Therapy Treatment Note      Name: Esther Membreno [facility]  Clinic Number: [id number]    Therapy Diagnosis:   Encounter Diagnoses   Name Primary?    Closed fracture dislocation of right ankle joint, with routine healing, subsequent encounter     Decreased range of motion of right ankle Yes    Gait abnormality      Physician: Donell Cruz PA-C    Visit Date: 7/25/2024    Physician Orders: PT Eval and Treat   Medical Diagnosis from Referral: Closed fracture dislocation of right ankle joint, with routine healing, subsequent encounter [S82.891D]   Evaluation Date: 6/11/2024  Authorization Period Expiration: 6/10/25  Plan of Care Expiration: 9/6/24  Progress Note Due: 8/16/24  Visit # / Visits authorized: 1/12 +eval  FOTO: 2/3 (last performed 7/11/2024)   PRECAUTIONS: Standard      MD Follow up: 9/3/24     Date of Surgery  4/27/24   Surgery Performed  Open reduction internal fixation right ankle pilon fracture fixation both distal tibia and fibula   Post-Op Precautions WBAT with CAM boot       PTA Visit #: -/5     Time In: 3:00 pm  Time Out: 4:00 pm  Total Billable Time: 60 minutes    Subjective     Patient reports: she has a f/u with Cruz and is able to wean into ankle brace. Pt states she walked 16,000 steps during work with pain primarily to arch and increase of swelling to ankle.     Esther was compliant with home exercise program.  Response to previous treatment: good workout  Functional change: n/a    Pain: 2/10  Location: right ankle    Objective      Objective Measures updated at progress report unless specified.      updated 7/16/24    Gait = amb with CAM boot donned to right foot       Ankle/Foot AROM/PROM Right Left Notes   Dorsiflexion (20º) 0  5/10     Plantarflexion (50º) 60  80     Inversion (35º) 45 45     Eversion (15º) 20 35          Treatment     Esther received the treatments listed below:      CPT Intervention  JointFocus Duration / Intensity  7/24   MT  STM  R medial arch    MT Mobs  metatarsals   TE Nustep  6 min     TE stretches c strap  NT   TE [place] Pickups  x2   TE BAPS up/down, side/side, cw/ccw NT      BFR + finished heel raises quickly   Could increase NV    NMR  Standing Heel raises X10    TA Step ups  -   TE Shuttle  3x10 4bands   SLP  Single leg raises       CPT Codes available for Billing:   (-) minutes of Manual therapy (MT) to improve pain and ROM.  (60) minutes of Therapeutic Exercise (TE) to develop strength, endurance, range of motion, and flexibility.  (-) minutes of Neuromuscular Re-Education (NMR)  to improve: Balance, Coordination, Kinesthetic, Sense, Proprioception, and Posture.  (-) minutes of Therapeutic Activities (TA) to improve functional performance.  (-) minutes of Gait Training (GT) to improve functional mobility and safety.  Unattended Electrical Stimulation (ES) for muscle performance or pain modulation.  Vasopneumatic Device Therapy () for management of swelling/edema. (65512)  BFR: Blood flow restriction applied during exercise  NP or (-): Not Performed    Patient Education and Home Exercises       Education provided:       Written Home Exercises Provided: Patient instructed to cont prior HEP. Exercises were reviewed and Esther was able to demonstrate them prior to the end of the session.  Esther demonstrated good  understanding of the education provided. See Electronic Medical Record under Patient Instructions for exercises provided during therapy sessions    Assessment     Pt presents to clinic with ankle brace donned. Performed manual to reduce stiffness and swelling due to increase activity. Continue to work on RLE strengthening with blood flow restriction. Continue to progess as tolerated.     Esther Is progressing well towards Esther's goals.   Patient prognosis is Good.     Patient will continue to benefit from skilled outpatient physical therapy to address the deficits listed in the problem list box on initial  evaluation, provide pt/family education and to maximize pt's level of independence in the home and community environment.     Patient's spiritual, cultural and educational needs considered and pt agreeable to plan of care and goals.     Anticipated barriers to physical therapy: transportation    Goals:   Short Term Goals:  6 weeks Status  Date Met   PAIN: Pt will report worst pain of 5/10 in order to progress toward max functional ability and improve quality of life. [x] Progressing  [] Met  [] Not Met     FUNCTION: Patient will improve functional outcome test score by 10 points  [x] Progressing  [] Met  [] Not Met     MOBILITY: Patient will improve ankle AROM by 15 percent in order to progress towards independence with functional activities.  [x] Progressing  [] Met  [] Not Met     STRENGTH: Patient will improve strength by 1 grade  in order to progress towards independence with functional activities. [x] Progressing  [] Met  [] Not Met     POSTURE: Patient will correct postural deviations in sitting and standing, to decrease pain and promote long term stability.  [x] Progressing  [] Met  [] Not Met     GAIT: Patient will demonstrate improved gait mechanics including least restricted device in order to improve functional mobility, improve quality of life, and decrease risk of further injury or fall.  [x] Progressing  [] Met  [] Not Met     HEP: Patient will demonstrate independence with HEP in order to progress toward functional independence. [x] Progressing  [] Met  [] Not Met        Long Term Goals:  12 weeks Status Date Met   PAIN: Pt will report worst pain of 1/10 in order to progress toward max functional ability and improve quality of life [x] Progressing  [] Met  [] Not Met     FUNCTION: Patient will demonstrate improved function as indicated by an increase of 20 points on FOTO [x] Progressing  [] Met  [] Not Met     MOBILITY: Patient will improve ankle AROM to WFL in order to return to maximal functional  potential and improve quality of life.  [x] Progressing  [] Met  [] Not Met     STRENGTH: Patient will improve strength to 4+/5 without pain in order to improve functional independence and quality of life. [x] Progressing  [] Met  [] Not Met     GAIT: Patient will demonstrate normalized gait mechanics with minimal compensation in order to return to PLOF. [x] Progressing  [] Met  [] Not Met     Patient will return to normal ADL's, IADL's, community involvement, recreational activities, and work-related activities with less than or equal to 1/10 pain and maximal function.  [x] Progressing  [] Met  [] Not Met           PLAN   Plan of care Certification: 6/11/2024 to 9/6/2024     Outpatient Physical Therapy 2 times weekly for 12 weeks to include any combination of the following interventions: virtual visits, dry needling, modalities, electrical stimulation (IFC, Pre-Mod, Attended with Functional Dry Needling), Electrical Stimulation  , Gait Training, Manual Therapy, Moist Heat/ Ice, Neuromuscular Re-ed, Patient Education, Self Care, Therapeutic Exercise, Functional Training, and Therapeutic Activites     Hoa Garnett, PT

## 2024-07-31 ENCOUNTER — CLINICAL SUPPORT (OUTPATIENT)
Dept: REHABILITATION | Facility: HOSPITAL | Age: 34
End: 2024-07-31
Payer: MEDICAID

## 2024-07-31 DIAGNOSIS — R26.9 GAIT ABNORMALITY: ICD-10-CM

## 2024-07-31 DIAGNOSIS — M25.671 DECREASED RANGE OF MOTION OF RIGHT ANKLE: Primary | ICD-10-CM

## 2024-07-31 PROCEDURE — 97110 THERAPEUTIC EXERCISES: CPT | Mod: PN

## 2024-08-02 ENCOUNTER — CLINICAL SUPPORT (OUTPATIENT)
Dept: REHABILITATION | Facility: HOSPITAL | Age: 34
End: 2024-08-02
Payer: MEDICAID

## 2024-08-02 DIAGNOSIS — R26.9 GAIT ABNORMALITY: ICD-10-CM

## 2024-08-02 DIAGNOSIS — M25.671 DECREASED RANGE OF MOTION OF RIGHT ANKLE: Primary | ICD-10-CM

## 2024-08-02 PROCEDURE — 97110 THERAPEUTIC EXERCISES: CPT | Mod: PN,CQ

## 2024-08-07 ENCOUNTER — CLINICAL SUPPORT (OUTPATIENT)
Dept: REHABILITATION | Facility: HOSPITAL | Age: 34
End: 2024-08-07
Payer: MEDICAID

## 2024-08-07 DIAGNOSIS — R26.9 GAIT ABNORMALITY: ICD-10-CM

## 2024-08-07 DIAGNOSIS — M25.671 DECREASED RANGE OF MOTION OF RIGHT ANKLE: Primary | ICD-10-CM

## 2024-08-07 PROCEDURE — 97110 THERAPEUTIC EXERCISES: CPT | Mod: PN,CQ

## 2024-08-15 ENCOUNTER — CLINICAL SUPPORT (OUTPATIENT)
Dept: REHABILITATION | Facility: HOSPITAL | Age: 34
End: 2024-08-15
Payer: MEDICAID

## 2024-08-15 ENCOUNTER — TELEPHONE (OUTPATIENT)
Dept: ORTHOPEDICS | Facility: CLINIC | Age: 34
End: 2024-08-15
Payer: MEDICAID

## 2024-08-15 DIAGNOSIS — R26.9 GAIT ABNORMALITY: ICD-10-CM

## 2024-08-15 DIAGNOSIS — M25.671 DECREASED RANGE OF MOTION OF RIGHT ANKLE: Primary | ICD-10-CM

## 2024-08-15 PROCEDURE — 97110 THERAPEUTIC EXERCISES: CPT | Mod: PN,CQ

## 2024-08-15 NOTE — PROGRESS NOTES
"[facility] OUTPATIENT THERAPY AND WELLNESS   Physical Therapy Treatment Note      Name: Esther Membreno [facility]  Clinic Number: [id number]    Therapy Diagnosis:   Encounter Diagnoses   Name Primary?    Decreased range of motion of right ankle Yes    Gait abnormality        Physician: Donell Cruz PA-C    Visit Date: 8/15/2024    Physician Orders: PT Eval and Treat   Medical Diagnosis from Referral: Closed fracture dislocation of right ankle joint, with routine healing, subsequent encounter [P87.455D]   Evaluation Date: 6/11/2024  Authorization Period Expiration: 6/10/25  Plan of Care Expiration: 9/6/24  Progress Note Due: 8/16/24  Visit # / Visits authorized: 4/12 +eval (episode 15)   FOTO: 2/3 (last performed 8/15/2024)   PRECAUTIONS: Standard      MD Follow up: 9/3/24     Date of Surgery  4/27/24   Surgery Performed  Open reduction internal fixation right ankle pilon fracture fixation both distal tibia and fibula   Post-Op Precautions WBAT with CAM boot       PTA Visit #: 3/5     Time In: 11:01am  Time Out: 12:02 pm  Total Billable Time: 59 minutes    Subjective     Patient reports: continuing to have a minor increase in pain after a work shift. Patient reports continuing to where the brace everyday when leaving the house.   Esther was compliant with home exercise program.  Response to previous treatment: workout soreness  Functional change: n/a    Pain: 2/10  Location: right ankle    Objective      Objective Measures updated at progress report unless specified.     Treatment     Esther received the treatments listed below:      CPT Intervention  JointFocus Duration / Intensity  8/15   MT STM  R medial arch    MT Mobs  Metatarsals    TE Upright Bike  5 min    TE Elliptical 5 minutes   TE Slantboard, gastroc 3 x30"    TE Walking 10 marble  2 sets    TE BAPS up/down, side/side, cw/ccw Standing, double leg; x20 each   TE BFR (not used) (80% PTP)   NMR 3 step to land on R  x20 with arch control     NMR Up 2 down 1 " "heel raise 2 x10   NMR Single Leg stance, R  3x30"  Ground  Foam  2x10   Ball toss   NMR Squats to box 20" 2 x10 maintain arch                PLAN          CPT Codes available for Billing:   (-) minutes of Manual therapy (MT) to improve pain and ROM.  (59) minutes of Therapeutic Exercise (TE) to develop strength, endurance, range of motion, and flexibility.  (-) minutes of Neuromuscular Re-Education (NMR)  to improve: Balance, Coordination, Kinesthetic, Sense, Proprioception, and Posture.  (-) minutes of Therapeutic Activities (TA) to improve functional performance.  (-) minutes of Gait Training (GT) to improve functional mobility and safety.  Unattended Electrical Stimulation (ES) for muscle performance or pain modulation.  Vasopneumatic Device Therapy () for management of swelling/edema. (37667)  BFR: Blood flow restriction applied during exercise  NP or (-): Not Performed    Patient Education and Home Exercises       Education provided:       Written Home Exercises Provided: Patient instructed to cont prior HEP. Exercises were reviewed and Esther was able to demonstrate them prior to the end of the session.  Esther demonstrated good  understanding of the education provided. See Electronic Medical Record under Patient Instructions for exercises provided during therapy sessions    Assessment     Continued focus of treatment attempting to further improve ankle Range of Motion, foot intrinsic/medial arch strength, as well as gait mechanics. Patient demonstrates gradually improving gait mechanics with proper heel to toe even when ambulating barefoot. Progressed shuttle leg press and heel raises to box squats and standing two up one down heel raises. Patient demonstrated difficulty maintaining arch with squats that improved with mirror feedback. Patient also demonstrated strength deficits with eccentric lowering on single foot requiring either bilateral upper extremity assistance or kickstand support. Patient was " able to complete all prescribed activities without exacerbations of ankle/foot pain. Plan to continue progression of strengthening further into function for greater quality of life.    Esther Is progressing well towards Esther's goals.   Patient prognosis is Good.     Patient will continue to benefit from skilled outpatient physical therapy to address the deficits listed in the problem list box on initial evaluation, provide pt/family education and to maximize pt's level of independence in the home and community environment.     Patient's spiritual, cultural and educational needs considered and pt agreeable to plan of care and goals.     Anticipated barriers to physical therapy: transportation    Goals:   Short Term Goals:  6 weeks Status  Date Met   PAIN: Pt will report worst pain of 5/10 in order to progress toward max functional ability and improve quality of life. [x] Progressing  [] Met  [] Not Met     FUNCTION: Patient will improve functional outcome test score by 10 points  [x] Progressing  [] Met  [] Not Met     MOBILITY: Patient will improve ankle AROM by 15 percent in order to progress towards independence with functional activities.  [x] Progressing  [] Met  [] Not Met     STRENGTH: Patient will improve strength by 1 grade  in order to progress towards independence with functional activities. [x] Progressing  [] Met  [] Not Met     POSTURE: Patient will correct postural deviations in sitting and standing, to decrease pain and promote long term stability.  [x] Progressing  [] Met  [] Not Met     GAIT: Patient will demonstrate improved gait mechanics including least restricted device in order to improve functional mobility, improve quality of life, and decrease risk of further injury or fall.  [x] Progressing  [] Met  [] Not Met     HEP: Patient will demonstrate independence with HEP in order to progress toward functional independence. [x] Progressing  [] Met  [] Not Met        Long Term Goals:  12 weeks  Status Date Met   PAIN: Pt will report worst pain of 1/10 in order to progress toward max functional ability and improve quality of life [x] Progressing  [] Met  [] Not Met     FUNCTION: Patient will demonstrate improved function as indicated by an increase of 20 points on FOTO [x] Progressing  [] Met  [] Not Met     MOBILITY: Patient will improve ankle AROM to WFL in order to return to maximal functional potential and improve quality of life.  [x] Progressing  [] Met  [] Not Met     STRENGTH: Patient will improve strength to 4+/5 without pain in order to improve functional independence and quality of life. [x] Progressing  [] Met  [] Not Met     GAIT: Patient will demonstrate normalized gait mechanics with minimal compensation in order to return to PLOF. [x] Progressing  [] Met  [] Not Met     Patient will return to normal ADL's, IADL's, community involvement, recreational activities, and work-related activities with less than or equal to 1/10 pain and maximal function.  [x] Progressing  [] Met  [] Not Met           PLAN   Plan of care Certification: 6/11/2024 to 9/6/2024     Outpatient Physical Therapy 2 times weekly for 12 weeks to include any combination of the following interventions: virtual visits, dry needling, modalities, electrical stimulation (IFC, Pre-Mod, Attended with Functional Dry Needling), Electrical Stimulation  , Gait Training, Manual Therapy, Moist Heat/ Ice, Neuromuscular Re-ed, Patient Education, Self Care, Therapeutic Exercise, Functional Training, and Therapeutic Activites     Alvaro Mancia PTA

## 2024-08-15 NOTE — TELEPHONE ENCOUNTER
----- Message from Angélica Walls sent at 8/15/2024 12:10 PM CDT -----  Contact: phoebe  Type:  Appointment Request    Caller is requesting a sooner appointment.  Caller declined first available appointment listed below.  Caller will not accept being placed on the waitlist and is requesting a message be sent to doctor.  Name of Caller:von  When is the first available appointment?unknown  Symptoms:reschedule   Would the patient rather a call back or a response via [facility]? Call back  Best Call Back Number:[phone]  Additional Information:

## 2024-08-22 ENCOUNTER — CLINICAL SUPPORT (OUTPATIENT)
Dept: REHABILITATION | Facility: HOSPITAL | Age: 34
End: 2024-08-22
Payer: MEDICAID

## 2024-08-22 DIAGNOSIS — M25.671 DECREASED RANGE OF MOTION OF RIGHT ANKLE: Primary | ICD-10-CM

## 2024-08-22 DIAGNOSIS — R26.9 GAIT ABNORMALITY: ICD-10-CM

## 2024-08-22 PROCEDURE — 97110 THERAPEUTIC EXERCISES: CPT | Mod: PN,CQ

## 2024-08-22 NOTE — PROGRESS NOTES
"[facility]PAT[facility] THERAPY [facility]   Physical Therapy Treatment Note      Name: Esther Membreno [facility]  Clinic Number: [id number]    Therapy Diagnosis:   Encounter Diagnoses   Name Primary?    Decreased range of motion of right ankle Yes    Gait abnormality        Physician: Donell Cruz PA-C    Visit Date: 8/22/2024    Physician Orders: PT Eval and Treat   Medical Diagnosis from Referral: Closed fracture dislocation of right ankle joint, with routine healing, subsequent encounter [H93.442Y]   Evaluation Date: 6/11/2024  Authorization Period Expiration: 6/10/25  Plan of Care Expiration: 9/6/24  Progress Note Due: 8/16/24  Visit # / Visits authorized: 5/12 +eval (episode 17)   FOTO: 2/3 (last performed 8/15/2024)   PRECAUTIONS: Standard      MD Follow up: 9/3/24     Date of Surgery  4/27/24   Surgery Performed  Open reduction internal fixation right ankle pilon fracture fixation both distal tibia and fibula   Post-Op Precautions WBAT with CAM boot       PTA Visit #: 4/5     Time In: 3:30pm  Time Out: 4:30 pm  Total Billable Time: 30 minutes    Subjective     Patient reports: over the past week been having a pain in both plantar feet. Patient reports she has been working a lot more consistently, has tried heat in the morning, ice in the evening, rolling her feet for several minutes, deep tissue massaging her feet every   Esther was compliant with home exercise program.  Response to previous treatment: workout soreness  Functional change: n/a    Pain: 5/10  Location: bilateral plantar feet    Objective      Objective Measures updated at progress report unless specified.     Treatment     Esther received the treatments listed below:      CPT Intervention  JointFocus Duration / Intensity  8/22   MT STM  B medial arch/gastroc   MT Mobs  Metatarsals    TE Upright Bike  5 min    TE Elliptical 5 minutes   TE Slantboard, gastroc 3 x30"    TE Walking 10 marble  2 sets    TE BAPS up/down, side/side, cw/ccw Standing, " "double leg; x20 each   TE BFR (not used) (80% PTP)   NMR 3 step to land on R  x20 with arch control     NMR Up 2 down 1 heel raise 2 x10   NMR Single Leg stance, R  3x30"  Ground  Foam  2x10   Ball toss   NMR Squats to box 20" 2 x10 maintain arch   NMR Supine 90/90 Nerve Glides X15 each                PLAN          CPT Codes available for Billing:   (06) minutes of Manual therapy (MT) to improve pain and ROM.  (14) minutes of Therapeutic Exercise (TE) to develop strength, endurance, range of motion, and flexibility.  (10) minutes of Neuromuscular Re-Education (NMR)  to improve: Balance, Coordination, Kinesthetic, Sense, Proprioception, and Posture.  (-) minutes of Therapeutic Activities (TA) to improve functional performance.  (-) minutes of Gait Training (GT) to improve functional mobility and safety.  Unattended Electrical Stimulation (ES) for muscle performance or pain modulation.  Vasopneumatic Device Therapy () for management of swelling/edema. (06188)  BFR: Blood flow restriction applied during exercise  NP or (-): Not Performed    Patient Education and Home Exercises       Education provided:       Written Home Exercises Provided: Patient instructed to cont prior HEP. Exercises were reviewed and Esther was able to demonstrate them prior to the end of the session.  Esther demonstrated good  understanding of the education provided. See Electronic Medical Record under Patient Instructions for exercises provided during therapy sessions    Assessment     Discussed with patient about potentially decreasing the amount or frequency of all of the pain relieving attempts they have been performing to decrease foot pain for any improvements. Patient demonstrated antalgic gait when ambulating on either foot, but was able to complete prescribed activities without limitations of foot pain being present.     Esther Is progressing well towards Esther's goals.   Patient prognosis is Good.     Patient will continue to benefit " from skilled outpatient physical therapy to address the deficits listed in the problem list box on initial evaluation, provide pt/family education and to maximize pt's level of independence in the home and community environment.     Patient's spiritual, cultural and educational needs considered and pt agreeable to plan of care and goals.     Anticipated barriers to physical therapy: transportation    Goals:   Short Term Goals:  6 weeks Status  Date Met   PAIN: Pt will report worst pain of 5/10 in order to progress toward max functional ability and improve quality of life. [x] Progressing  [] Met  [] Not Met     FUNCTION: Patient will improve functional outcome test score by 10 points  [x] Progressing  [] Met  [] Not Met     MOBILITY: Patient will improve ankle AROM by 15 percent in order to progress towards independence with functional activities.  [x] Progressing  [] Met  [] Not Met     STRENGTH: Patient will improve strength by 1 grade  in order to progress towards independence with functional activities. [x] Progressing  [] Met  [] Not Met     POSTURE: Patient will correct postural deviations in sitting and standing, to decrease pain and promote long term stability.  [x] Progressing  [] Met  [] Not Met     GAIT: Patient will demonstrate improved gait mechanics including least restricted device in order to improve functional mobility, improve quality of life, and decrease risk of further injury or fall.  [x] Progressing  [] Met  [] Not Met     HEP: Patient will demonstrate independence with HEP in order to progress toward functional independence. [x] Progressing  [] Met  [] Not Met        Long Term Goals:  12 weeks Status Date Met   PAIN: Pt will report worst pain of 1/10 in order to progress toward max functional ability and improve quality of life [x] Progressing  [] Met  [] Not Met     FUNCTION: Patient will demonstrate improved function as indicated by an increase of 20 points on FOTO [x] Progressing  []  Met  [] Not Met     MOBILITY: Patient will improve ankle AROM to WFL in order to return to maximal functional potential and improve quality of life.  [x] Progressing  [] Met  [] Not Met     STRENGTH: Patient will improve strength to 4+/5 without pain in order to improve functional independence and quality of life. [x] Progressing  [] Met  [] Not Met     GAIT: Patient will demonstrate normalized gait mechanics with minimal compensation in order to return to PLOF. [x] Progressing  [] Met  [] Not Met     Patient will return to normal ADL's, IADL's, community involvement, recreational activities, and work-related activities with less than or equal to 1/10 pain and maximal function.  [x] Progressing  [] Met  [] Not Met           PLAN   Plan of care Certification: 6/11/2024 to 9/6/2024     Outpatient Physical Therapy 2 times weekly for 12 weeks to include any combination of the following interventions: virtual visits, dry needling, modalities, electrical stimulation (IFC, Pre-Mod, Attended with Functional Dry Needling), Electrical Stimulation  , Gait Training, Manual Therapy, Moist Heat/ Ice, Neuromuscular Re-ed, Patient Education, Self Care, Therapeutic Exercise, Functional Training, and Therapeutic Activites     Alvaro Mancia, SOL

## 2024-09-13 ENCOUNTER — OFFICE VISIT (OUTPATIENT)
Dept: ORTHOPEDICS | Facility: CLINIC | Age: 34
End: 2024-09-13
Attending: PHYSICIAN ASSISTANT
Payer: MEDICAID

## 2024-09-13 ENCOUNTER — HOSPITAL ENCOUNTER (OUTPATIENT)
Dept: RADIOLOGY | Facility: HOSPITAL | Age: 34
Discharge: HOME | End: 2024-09-13
Attending: PHYSICIAN ASSISTANT
Payer: MEDICAID

## 2024-09-13 VITALS
SYSTOLIC BLOOD PRESSURE: 116 MMHG | HEIGHT: 62 IN | TEMPERATURE: 98 F | DIASTOLIC BLOOD PRESSURE: 80 MMHG | BODY MASS INDEX: 33.43 KG/M2 | HEART RATE: 80 BPM | WEIGHT: 181.69 LBS

## 2024-09-13 DIAGNOSIS — M72.2 PLANTAR FASCIITIS, BILATERAL: ICD-10-CM

## 2024-09-13 DIAGNOSIS — M25.571 RIGHT ANKLE PAIN, UNSPECIFIED CHRONICITY: ICD-10-CM

## 2024-09-13 DIAGNOSIS — M25.571 RIGHT ANKLE PAIN, UNSPECIFIED CHRONICITY: Primary | ICD-10-CM

## 2024-09-13 DIAGNOSIS — S82.891D: Primary | ICD-10-CM

## 2024-09-13 PROCEDURE — 4010F ACE/ARB THERAPY RXD/TAKEN: CPT | Mod: CPTII,,, | Performed by: PHYSICIAN ASSISTANT

## 2024-09-13 PROCEDURE — 3079F DIAST BP 80-89 MM HG: CPT | Mod: CPTII,,, | Performed by: PHYSICIAN ASSISTANT

## 2024-09-13 PROCEDURE — 1160F RVW MEDS BY RX/DR IN RCRD: CPT | Mod: CPTII,,, | Performed by: PHYSICIAN ASSISTANT

## 2024-09-13 PROCEDURE — 3008F BODY MASS INDEX DOCD: CPT | Mod: CPTII,,, | Performed by: PHYSICIAN ASSISTANT

## 2024-09-13 PROCEDURE — 99213 OFFICE O/P EST LOW 20 MIN: CPT | Mod: PBBFAC,25 | Performed by: PHYSICIAN ASSISTANT

## 2024-09-13 PROCEDURE — 99999 PR PBB SHADOW E&M-EST. PATIENT-LVL III: CPT | Mod: PBBFAC,,, | Performed by: PHYSICIAN ASSISTANT

## 2024-09-13 PROCEDURE — 3074F SYST BP LT 130 MM HG: CPT | Mod: CPTII,,, | Performed by: PHYSICIAN ASSISTANT

## 2024-09-13 PROCEDURE — 73610 X-RAY EXAM OF ANKLE: CPT | Mod: 26,RT,, | Performed by: RADIOLOGY

## 2024-09-13 PROCEDURE — 73610 X-RAY EXAM OF ANKLE: CPT | Mod: TC,RT

## 2024-09-13 PROCEDURE — 1159F MED LIST DOCD IN RCRD: CPT | Mod: CPTII,,, | Performed by: PHYSICIAN ASSISTANT

## 2024-09-13 PROCEDURE — 99214 OFFICE O/P EST MOD 30 MIN: CPT | Mod: S$PBB,,, | Performed by: PHYSICIAN ASSISTANT

## 2024-09-13 NOTE — PROGRESS NOTES
"Subjective:      Patient ID: Esther Romo is a 34 y.o. adult.    Chief Complaint: Pain of the Right Ankle    HPI: Esther Romo is a 34 y.o. adult in clinic today for postoperative follow-up.  Patient is 4-1/2 months status post ORIF of right ankle pilon fracture with fixation of both distal tibia and fibula.  Patient is doing very well this time.  She is fully weight-bearing and only using the lace-up ankle brace.  She denies numbness or tingling in the extremity.  No new complaints at this time.    Past Medical History:   Diagnosis Date    Alcohol use, unspecified with unspecified alcohol-induced disorder     Anxiety     Hypertension     Miscarriage        Current Outpatient Medications:     busPIRone (BUSPAR) 10 MG tablet, Take 1 tablet (10 mg total) by mouth 2 (two) times daily., Disp: 180 tablet, Rfl: 2    hydrOXYzine HCL (ATARAX) 25 MG tablet, Take 1 tablet (25 mg total) by mouth 3 (three) times daily as needed for Itching., Disp: 90 tablet, Rfl: 1    lisinopriL-hydrochlorothiazide (PRINZIDE,ZESTORETIC) 20-12.5 mg per tablet, Take 1 tablet by mouth every morning., Disp: 90 tablet, Rfl: 3    multivit-minerals/folic acid (WOMEN'S MULTIVITAMIN GUMMIES ORAL), Take by mouth once daily., Disp: , Rfl:     traZODone (DESYREL) 50 MG tablet, Take 1 tablet (50 mg total) by mouth every evening., Disp: 30 tablet, Rfl: 3    venlafaxine (EFFEXOR-XR) 150 MG Cp24, Take 1 capsule (150 mg total) by mouth once daily., Disp: 90 capsule, Rfl: 3    aspirin (ECOTRIN) 81 MG EC tablet, Take 1 tablet (81 mg total) by mouth 2 (two) times a day., Disp: , Rfl:   Review of patient's allergies indicates:  No Known Allergies    /80   Pulse 80   Temp 97.8 °F (36.6 °C)   Ht 5' 2" (1.575 m)   Wt 82.4 kg (181 lb 10.5 oz)   BMI 33.23 kg/m²     ROS      Objective:    Ortho Exam   Right ankle:   Incisions are well healed, no signs of infection   No TTP   Minimal edema   ROM is greatly improved   Calf and compartments are soft and " compressible   Motor exam normal   Sensation and pulses intact   Cap refill brisk    GEN: Well developed, well nourished adult. AAOX3. No acute distress.   Head: Normocephalic, atraumatic.   Eyes: CLEMENTE  Neck: Trachea is midline, no adenopathy  Resp: Breathing unlabored.  Neuro: Motor function normal, Cranial nerves intact  Psych: Mood and affect appropriate.       Assessment:     Imaging:  X-ray right ankle obtained today shows hardware in satisfactory alignment with no signs of failure.  There is what appears to be complete healing at the fracture sites.  No detrimental changes.        1. Closed fracture dislocation of right ankle joint, with routine healing, subsequent encounter    2. Plantar fasciitis, bilateral          Plan:       Reviewed the radiographs with the patient.  Recommended she continue to increase all activities as tolerated.  She may discontinue the use of the lace-up ankle brace at this time if she was comfortable doing so.  She would like to go see podiatry for which she believes is plantar fasciitis, so we have placed a referral.  We will see the patient back in clinic in about 6 weeks, if necessary.    Orders Placed This Encounter    Ambulatory referral/consult to Podiatry     Follow up in about 6 weeks (around 10/25/2024).          Patient note was created using MModal Dictation.  Any errors in syntax or even information may not have been identified and edited on initial review prior to signing this note.

## 2024-09-16 ENCOUNTER — TELEPHONE (OUTPATIENT)
Dept: PODIATRY | Facility: CLINIC | Age: 34
End: 2024-09-16
Payer: MEDICAID

## 2024-09-16 NOTE — TELEPHONE ENCOUNTER
Spoke with patient about being scheduled, call ended pleasantly.       ----- Message from Bijan Tovar sent at 9/16/2024 10:31 AM CDT -----  Type:  Needs Medical Advice    Who Called: Esther   Symptoms (please be specific):    How long has patient had these symptoms:    Pharmacy name and phone #:    Would the patient rather a call back or a response via [facility]?   Best Call Back Number: [phone]  Additional Information: Patient will like to be schedule and has a referral

## 2024-10-01 ENCOUNTER — LAB VISIT (OUTPATIENT)
Dept: LAB | Facility: HOSPITAL | Age: 34
End: 2024-10-01
Attending: FAMILY MEDICINE
Payer: MEDICAID

## 2024-10-01 ENCOUNTER — OFFICE VISIT (OUTPATIENT)
Dept: FAMILY MEDICINE | Facility: CLINIC | Age: 34
End: 2024-10-01
Payer: MEDICAID

## 2024-10-01 VITALS
DIASTOLIC BLOOD PRESSURE: 80 MMHG | HEART RATE: 94 BPM | SYSTOLIC BLOOD PRESSURE: 118 MMHG | OXYGEN SATURATION: 97 % | WEIGHT: 193.88 LBS | BODY MASS INDEX: 35.46 KG/M2

## 2024-10-01 DIAGNOSIS — I10 PRIMARY HYPERTENSION: ICD-10-CM

## 2024-10-01 DIAGNOSIS — F43.23 ADJUSTMENT REACTION WITH ANXIETY AND DEPRESSION: ICD-10-CM

## 2024-10-01 DIAGNOSIS — Z00.00 WELL ADULT EXAM: Primary | ICD-10-CM

## 2024-10-01 DIAGNOSIS — Z23 NEED FOR VACCINATION: ICD-10-CM

## 2024-10-01 DIAGNOSIS — Z00.00 WELL ADULT EXAM: ICD-10-CM

## 2024-10-01 DIAGNOSIS — Z23 IMMUNIZATION, TETANUS-DIPHTHERIA: ICD-10-CM

## 2024-10-01 LAB
ALBUMIN SERPL BCP-MCNC: 3.9 G/DL (ref 3.5–5.2)
ALP SERPL-CCNC: 78 U/L (ref 55–135)
ALT SERPL W/O P-5'-P-CCNC: 16 U/L (ref 10–44)
ANION GAP SERPL CALC-SCNC: 8 MMOL/L (ref 8–16)
AST SERPL-CCNC: 15 U/L (ref 10–40)
BASOPHILS # BLD AUTO: 0.08 K/UL (ref 0–0.2)
BASOPHILS NFR BLD: 0.8 % (ref 0–1.9)
BILIRUB SERPL-MCNC: 0.3 MG/DL (ref 0.1–1)
BUN SERPL-MCNC: 15 MG/DL (ref 6–20)
CALCIUM SERPL-MCNC: 9.6 MG/DL (ref 8.7–10.5)
CHLORIDE SERPL-SCNC: 102 MMOL/L (ref 95–110)
CHOLEST SERPL-MCNC: 198 MG/DL (ref 120–199)
CHOLEST/HDLC SERPL: 3 {RATIO} (ref 2–5)
CO2 SERPL-SCNC: 24 MMOL/L (ref 23–29)
CREAT SERPL-MCNC: 0.8 MG/DL (ref 0.5–1.4)
DIFFERENTIAL METHOD BLD: ABNORMAL
EOSINOPHIL # BLD AUTO: 0.2 K/UL (ref 0–0.5)
EOSINOPHIL NFR BLD: 1.7 % (ref 0–8)
ERYTHROCYTE [DISTWIDTH] IN BLOOD BY AUTOMATED COUNT: 15.2 % (ref 11.5–14.5)
EST. GFR  (NO RACE VARIABLE): >60 ML/MIN/1.73 M^2
GLUCOSE SERPL-MCNC: 92 MG/DL (ref 70–110)
HCT VFR BLD AUTO: 44.9 % (ref 37–48.5)
HDLC SERPL-MCNC: 67 MG/DL (ref 40–75)
HDLC SERPL: 33.8 % (ref 20–50)
HGB BLD-MCNC: 14.1 G/DL (ref 12–16)
IMM GRANULOCYTES # BLD AUTO: 0.03 K/UL (ref 0–0.04)
IMM GRANULOCYTES NFR BLD AUTO: 0.3 % (ref 0–0.5)
LDLC SERPL CALC-MCNC: 100 MG/DL (ref 63–159)
LYMPHOCYTES # BLD AUTO: 2.9 K/UL (ref 1–4.8)
LYMPHOCYTES NFR BLD: 29.4 % (ref 18–48)
MCH RBC QN AUTO: 27.8 PG (ref 27–31)
MCHC RBC AUTO-ENTMCNC: 31.4 G/DL (ref 32–36)
MCV RBC AUTO: 89 FL (ref 82–98)
MONOCYTES # BLD AUTO: 0.9 K/UL (ref 0.3–1)
MONOCYTES NFR BLD: 9.1 % (ref 4–15)
NEUTROPHILS # BLD AUTO: 5.8 K/UL (ref 1.8–7.7)
NEUTROPHILS NFR BLD: 58.7 % (ref 38–73)
NONHDLC SERPL-MCNC: 131 MG/DL
NRBC BLD-RTO: 0 /100 WBC
PLATELET # BLD AUTO: 401 K/UL (ref 150–450)
PMV BLD AUTO: 11.1 FL (ref 9.2–12.9)
POTASSIUM SERPL-SCNC: 4.1 MMOL/L (ref 3.5–5.1)
PROT SERPL-MCNC: 7.6 G/DL (ref 6–8.4)
RBC # BLD AUTO: 5.07 M/UL (ref 4–5.4)
SODIUM SERPL-SCNC: 134 MMOL/L (ref 136–145)
TRIGL SERPL-MCNC: 155 MG/DL (ref 30–150)
WBC # BLD AUTO: 9.88 K/UL (ref 3.9–12.7)

## 2024-10-01 PROCEDURE — 3079F DIAST BP 80-89 MM HG: CPT | Mod: CPTII,,, | Performed by: FAMILY MEDICINE

## 2024-10-01 PROCEDURE — 4010F ACE/ARB THERAPY RXD/TAKEN: CPT | Mod: CPTII,,, | Performed by: FAMILY MEDICINE

## 2024-10-01 PROCEDURE — 99999PBSHW INFLUENZA - TRIVALENT - PF (ADULT): Mod: PBBFAC,,,

## 2024-10-01 PROCEDURE — 99213 OFFICE O/P EST LOW 20 MIN: CPT | Mod: S$PBB,25,, | Performed by: FAMILY MEDICINE

## 2024-10-01 PROCEDURE — 99999 PR PBB SHADOW E&M-EST. PATIENT-LVL III: CPT | Mod: PBBFAC,,, | Performed by: FAMILY MEDICINE

## 2024-10-01 PROCEDURE — 83036 HEMOGLOBIN GLYCOSYLATED A1C: CPT | Performed by: FAMILY MEDICINE

## 2024-10-01 PROCEDURE — 80061 LIPID PANEL: CPT | Performed by: FAMILY MEDICINE

## 2024-10-01 PROCEDURE — 99999PBSHW PR PBB SHADOW TECHNICAL ONLY FILED TO HB: Mod: PBBFAC,,,

## 2024-10-01 PROCEDURE — 3008F BODY MASS INDEX DOCD: CPT | Mod: CPTII,,, | Performed by: FAMILY MEDICINE

## 2024-10-01 PROCEDURE — 99395 PREV VISIT EST AGE 18-39: CPT | Mod: S$PBB,,, | Performed by: FAMILY MEDICINE

## 2024-10-01 PROCEDURE — 36415 COLL VENOUS BLD VENIPUNCTURE: CPT | Mod: PN | Performed by: FAMILY MEDICINE

## 2024-10-01 PROCEDURE — 85025 COMPLETE CBC W/AUTO DIFF WBC: CPT | Performed by: FAMILY MEDICINE

## 2024-10-01 PROCEDURE — 80053 COMPREHEN METABOLIC PANEL: CPT | Performed by: FAMILY MEDICINE

## 2024-10-01 PROCEDURE — 90471 IMMUNIZATION ADMIN: CPT | Mod: PBBFAC,PN

## 2024-10-01 PROCEDURE — 3074F SYST BP LT 130 MM HG: CPT | Mod: CPTII,,, | Performed by: FAMILY MEDICINE

## 2024-10-01 PROCEDURE — 90656 IIV3 VACC NO PRSV 0.5 ML IM: CPT | Mod: PBBFAC,PN

## 2024-10-01 PROCEDURE — 99213 OFFICE O/P EST LOW 20 MIN: CPT | Mod: PBBFAC,PN,25 | Performed by: FAMILY MEDICINE

## 2024-10-01 PROCEDURE — 90715 TDAP VACCINE 7 YRS/> IM: CPT | Mod: PBBFAC,PN

## 2024-10-01 PROCEDURE — 1159F MED LIST DOCD IN RCRD: CPT | Mod: CPTII,,, | Performed by: FAMILY MEDICINE

## 2024-10-01 RX ORDER — NAPROXEN 500 MG/1
500 TABLET ORAL 2 TIMES DAILY WITH MEALS
COMMUNITY
Start: 2024-09-25 | End: 2025-03-24

## 2024-10-01 RX ORDER — HYDROXYZINE HYDROCHLORIDE 25 MG/1
25 TABLET, FILM COATED ORAL 3 TIMES DAILY PRN
Qty: 90 TABLET | Refills: 1 | Status: SHIPPED | OUTPATIENT
Start: 2024-10-01

## 2024-10-01 RX ORDER — BUSPIRONE HYDROCHLORIDE 10 MG/1
10 TABLET ORAL 2 TIMES DAILY
Qty: 180 TABLET | Refills: 2 | Status: SHIPPED | OUTPATIENT
Start: 2024-10-01

## 2024-10-01 RX ADMIN — TETANUS TOXOID, REDUCED DIPHTHERIA TOXOID AND ACELLULAR PERTUSSIS VACCINE, ADSORBED 0.5 ML: 5; 2.5; 8; 8; 2.5 SUSPENSION INTRAMUSCULAR at 10:10

## 2024-10-01 NOTE — PROGRESS NOTES
Chief Complaint:  No chief complaint on file.      History of Present Illness:    History of Present Illness    Patient presents today for annual follow-up. They report full function following ankle surgery, able to perform all normal activities of daily living but have not resumed running or jumping. They developed plantar fasciitis post-surgery. They received treatment at urgent care, including anti-inflammatory and steroid medications, which they recently completed with some symptom improvement. They are performing recommended exercises, using insoles, and a PS roller. Their stretching routine includes 30-second stretches 3 times daily. They also use a frozen lemon for relief at home. They express concern about potential symptom flare-up before their upcoming podiatry appointment on November 18th. They report a family history of plantar fasciitis, mentioning their father had the condition. They report feeling good lately and have decided to maintain their current antidepressant regimen, believing the current mixture is working well. They deny any significant mental health concerns at this time. They request refills for hydroxyzine and BuSpar. They report quitting tobacco smoking for six months and have significantly reduced vaping. They continue to use medical marijuana with a valid medical card. Their alcohol consumption has decreased compared to previous visits, currently drinking a couple glasses of wine a few times per week. They express decreased interest in alcohol consumption and feel they are gaining better control over their drinking habits. They are due for a Pap smear, with their last one in 2021. They have a history of polycystic ovary syndrome (PCOS) and a previous breast lumpectomy. They express a preference for seeing a gynecologist for comprehensive care due to multiple GYN concerns and request a referral to one within the [facility] who accepts Medicaid. They are due for tetanus and flu  vaccines, which they agree to receive today. They express interest in getting a COVID-19 booster, noting it has been a long time since their last one. They report a current weight of 193 lbs, an increase from their previous weight of around 180 lbs. They attribute this weight gain to reduced physical activity following their ankle injury and plantar fasciitis. They express frustration with having to be gentle on their feet and ankle, limiting their ability to engage in more strenuous physical activities like aerial silks and trapeze, which they previously enjoyed. 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      ROS:  General: denies fever, denies chills, denies fatigue, admits weight gain, denies weight loss, denies loss of appetite  Eyes: denies vision changes, denies blurry vision, denies eye pain, denies eye discharge  ENT: denies ear pain, denies hearing loss, denies tinnitus, denies nasal congestion, denies sore throat  Cardiovascular: denies chest pain, denies palpitations, denies lower extremity edema  Respiratory: denies cough, denies shortness of breath, denies wheezing, denies sputum production  Endocrine: denies polyuria, denies polydipsia, denies heat intolerance, denies cold intolerance  Gastrointestinal: denies abdominal pain, denies heartburn, denies nausea, denies vomiting, denies diarrhea, denies constipation, denies blood in stool  Genitourinary: denies dysuria, denies urgency, denies frequency, denies hematuria, denies nocturia, denies incontinence  Heme & Lymphatic: denies easy or excessive bleeding, denies easy bruising, denies swollen lymph nodes  Musculoskeletal: denies muscle pain, denies back pain, denies joint pain, denies joint swelling  Skin: denies rash, denies lesion, denies itching, denies skin texture changes, denies skin color changes  Neurological: denies headache,  "denies dizziness, denies numbness, denies tingling, denies seizure activity, denies speech difficulty, denies memory loss, denies confusion  Psychiatric: denies anxiety, denies depression, denies sleep difficulty           Past Medical History:   Diagnosis Date    Alcohol use, unspecified with unspecified alcohol-induced disorder     Anxiety     Hypertension     Miscarriage        Social History:  Social History     Socioeconomic History    Marital status: Single   Tobacco Use    Smoking status: Some Days     Current packs/day: 0.50     Types: Cigarettes, Vaping with nicotine    Smokeless tobacco: Never   Substance and Sexual Activity    Alcohol use: Yes     Comment: "I'm an alcoholic"    Drug use: Yes     Types: Cocaine, Marijuana     Comment: ocassionally     Social Drivers of Health     Financial Resource Strain: Medium Risk (4/29/2024)    Overall Financial Resource Strain (CARDIA)     Difficulty of Paying Living Expenses: Somewhat hard   Food Insecurity: Food Insecurity Present (4/29/2024)    Hunger Vital Sign     Worried About Running Out of Food in the Last Year: Sometimes true     Ran Out of Food in the Last Year: Never true   Transportation Needs: No Transportation Needs (4/29/2024)    PRAPARE - Transportation     Lack of Transportation (Medical): No     Lack of Transportation (Non-Medical): No   Physical Activity: Insufficiently Active (4/29/2024)    Exercise Vital Sign     Days of Exercise per Week: 1 day     Minutes of Exercise per Session: 30 min   Stress: Stress Concern Present (4/29/2024)    [employer] - Occupational Stress Questionnaire     Feeling of Stress : Rather much   Housing Stability: High Risk (4/29/2024)    Housing Stability Vital Sign     Unable to Pay for Housing in the Last Year: Yes       Family History:   family history includes No Known Problems in Esther's father and mother.    Health Maintenance   Topic Date Due    TETANUS VACCINE  10/01/2034    " Hepatitis C Screening  Completed    Lipid Panel  Completed       Exam:Physical     Vital Signs  Pulse: 94  SpO2: 97 %  BP: 118/80  BP Location: Left arm  Height and Weight  Weight: 87.9 kg (193 lb 14.3 oz)]    Body mass index is 35.46 kg/m².    Physical Exam    Vitals: Weight: 193 lbs.  General: Well-developed. Well-nourished. No acute distress.  Eyes: EOMI. Sclerae anicteric.  HENT: Normocephalic. Atraumatic. Nares patent. Moist oral mucosa.  Cardiovascular: Regular rate. Regular rhythm. No murmurs. No rubs. No gallops. Normal S1, S2.  Respiratory: Normal respiratory effort. Clear to auscultation bilaterally. No rales. No rhonchi. No wheezing.  Musculoskeletal: No  obvious deformity.  Extremities: No lower extremity edema.  Neurological: Alert & oriented x3. No slurred speech. Normal gait.  Psychiatric: Normal mood. Normal affect. Good insight. Good judgment.  Skin: Warm. Dry. No rash.           Assessment:        ICD-10-CM ICD-9-CM   1. Well adult exam  Z00.00 V70.0   2. Adjustment reaction with anxiety and depression  F43.23 309.28   3. Primary hypertension  I10 401.9   4. Immunization, tetanus-diphtheria  Z23 V06.5   5. Need for vaccination  Z23 V05.9         Plan:    Assessment & Plan    MEDICAL DECISION MAKING:  - Assessed plantar fasciitis, noting recent steroid treatment from urgent care has provided some relief  - Considered conservative management for plantar fasciitis, emphasizing exercises over cortisone injections due to risk of tendon tear  - Evaluated need for annual labs  - Determined patient due for tetanus and flu vaccinations  - Noted patient's weight gain since last visit, considered potential for increased physical activity within limitations of foot condition    PATIENT EDUCATION:  - Explained that 90-95% of plantar fasciitis cases improve with exercises alone  - Discussed risks of cortisone injections in the tendon, including potential for tendon tear  - Demonstrated additional stretching  exercise for plantar fasciitis: standing on edge of step with toes and lowering heel    ACTION ITEMS/LIFESTYLE:  - Patient to continue plantar fasciitis exercises and stretches  - Recommend considering upper body exercises at the gym or bicycling to maintain activity level while managing foot condition    MEDICATIONS:  - Refilled BuSpar  - Refilled hydroxyzine    ORDERS:  - Blood tests ordered: cholesterol, diabetes screening, liver and kidney function  - Administered tetanus vaccine  - Administered flu vaccine    REFERRALS:  - Referred to gynecology for consultation         Diagnoses and all orders for this visit:    Well adult exam  -     CBC Auto Differential; Future  -     Comprehensive Metabolic Panel; Future  -     Lipid Panel; Future  -     Hemoglobin A1C; Future  -     Cancel: Ambulatory referral/consult to Gynecology; Future  -     Ambulatory referral/consult to Gynecology; Future    Adjustment reaction with anxiety and depression    Primary hypertension  -     Comprehensive Metabolic Panel; Future  -     Lipid Panel; Future  -     Hemoglobin A1C; Future  -     Tdap (BOOSTRIX) vaccine injection 0.5 mL    Immunization, tetanus-diphtheria  -     Tdap (BOOSTRIX) vaccine injection 0.5 mL    Need for vaccination  -     Influenza - Trivalent - PF (ADULT)    Other orders  -     hydrOXYzine HCL (ATARAX) 25 MG tablet; Take 1 tablet (25 mg total) by mouth 3 (three) times daily as needed for Itching.  -     busPIRone (BUSPAR) 10 MG tablet; Take 1 tablet (10 mg total) by mouth 2 (two) times daily.        Follow up in about 1 year (around 10/1/2025).      Norma Shine MD

## 2024-10-02 LAB
ESTIMATED AVG GLUCOSE: 103 MG/DL (ref 68–131)
HBA1C MFR BLD: 5.2 % (ref 4–5.6)

## 2024-10-03 ENCOUNTER — PATIENT MESSAGE (OUTPATIENT)
Dept: FAMILY MEDICINE | Facility: CLINIC | Age: 34
End: 2024-10-03
Payer: MEDICAID

## 2024-11-18 ENCOUNTER — OFFICE VISIT (OUTPATIENT)
Dept: PODIATRY | Facility: CLINIC | Age: 34
End: 2024-11-18
Payer: MEDICAID

## 2024-11-18 DIAGNOSIS — M79.671 BILATERAL FOOT PAIN: ICD-10-CM

## 2024-11-18 DIAGNOSIS — M72.2 PLANTAR FASCIITIS OF LEFT FOOT: ICD-10-CM

## 2024-11-18 DIAGNOSIS — M79.672 BILATERAL FOOT PAIN: ICD-10-CM

## 2024-11-18 DIAGNOSIS — M72.2 PLANTAR FASCIITIS OF RIGHT FOOT: Primary | ICD-10-CM

## 2024-11-18 PROCEDURE — 20550 NJX 1 TENDON SHEATH/LIGAMENT: CPT | Mod: PBBFAC,LT | Performed by: PODIATRIST

## 2024-11-18 PROCEDURE — 99213 OFFICE O/P EST LOW 20 MIN: CPT | Mod: PBBFAC | Performed by: PODIATRIST

## 2024-11-18 PROCEDURE — 99999PBSHW PR PBB SHADOW TECHNICAL ONLY FILED TO HB: Mod: PBBFAC,,,

## 2024-11-18 PROCEDURE — 20550 NJX 1 TENDON SHEATH/LIGAMENT: CPT | Mod: PBBFAC,RT | Performed by: PODIATRIST

## 2024-11-18 PROCEDURE — 99999 PR PBB SHADOW E&M-EST. PATIENT-LVL III: CPT | Mod: PBBFAC,,, | Performed by: PODIATRIST

## 2024-11-18 RX ORDER — NAPROXEN 500 MG/1
500 TABLET ORAL 2 TIMES DAILY
Qty: 20 TABLET | Refills: 0 | Status: SHIPPED | OUTPATIENT
Start: 2024-11-18 | End: 2024-11-28

## 2024-11-18 RX ORDER — TRIAMCINOLONE ACETONIDE 40 MG/ML
40 INJECTION, SUSPENSION INTRA-ARTICULAR; INTRAMUSCULAR
Status: DISCONTINUED | OUTPATIENT
Start: 2024-11-18 | End: 2024-11-18 | Stop reason: HOSPADM

## 2024-11-18 RX ORDER — DEXAMETHASONE SODIUM PHOSPHATE 4 MG/ML
4 INJECTION, SOLUTION INTRA-ARTICULAR; INTRALESIONAL; INTRAMUSCULAR; INTRAVENOUS; SOFT TISSUE
Status: DISCONTINUED | OUTPATIENT
Start: 2024-11-18 | End: 2024-11-18 | Stop reason: HOSPADM

## 2024-11-18 RX ADMIN — DEXAMETHASONE SODIUM PHOSPHATE 4 MG: 4 INJECTION INTRA-ARTICULAR; INTRALESIONAL; INTRAMUSCULAR; INTRAVENOUS; SOFT TISSUE at 01:11

## 2024-11-18 RX ADMIN — TRIAMCINOLONE ACETONIDE 40 MG: 200 INJECTION, SUSPENSION INTRA-ARTICULAR; INTRAMUSCULAR at 01:11

## 2024-11-18 NOTE — PROGRESS NOTES
"  Subjective:       Patient ID: Esther Romo is a 34 y.o. adult.    Chief Complaint: Plantar Fasciitis (Bilateral plantar fasciitis: pt pain rate is 6/10 pain, pt is nondiabetic, stated, went to urgent care they gave her a shot in hip and naproxen, wonders if she could get steroid shot in both feet. Left is worse than right.)      HPI: Esther Romo complains of moderate pains to the bilateral foot. States pains are sharp and stabbing-like in nature. Pains are to the plantar foot, mostly with walking and standing. Rates the pains at approx. 6/10. States post-static dyskinesia. Denies any recent identifiable trauma. Does limp with gait.  Reports that shoe was provided oral anti-inflammatory which did help with the pain, however states this is increasing.  She reports increased pain on the left versus the right.  History of right ankle fracture ORIF in April of 2024.  Pains have been present for the past several weeks to months and the pains have worsened over the past couple of weeks. States walking and standing causes and/or exacerbates the symptoms. Patient has had no corticosteroid injection(s) prior. Patient's Primary Care Provider is Norma Shine MD.     Review of patient's allergies indicates:  No Known Allergies    Past Medical History:   Diagnosis Date    Alcohol use, unspecified with unspecified alcohol-induced disorder     Anxiety     Hypertension     Miscarriage        Family History   Problem Relation Name Age of Onset    No Known Problems Mother      No Known Problems Father         Social History     Socioeconomic History    Marital status: Single   Tobacco Use    Smoking status: Some Days     Current packs/day: 0.50     Types: Cigarettes, Vaping with nicotine    Smokeless tobacco: Never   Substance and Sexual Activity    Alcohol use: Yes     Comment: "I'm an alcoholic"    Drug use: Yes     Types: Cocaine, Marijuana     Comment: ocassionally     Social Drivers of Health     Financial " Resource Strain: Medium Risk (4/29/2024)    Overall Financial Resource Strain (CARDIA)     Difficulty of Paying Living Expenses: Somewhat hard   Food Insecurity: Food Insecurity Present (4/29/2024)    Hunger Vital Sign     Worried About Running Out of Food in the Last Year: Sometimes true     Ran Out of Food in the Last Year: Never true   Transportation Needs: No Transportation Needs (4/29/2024)    PRAPARE - Transportation     Lack of Transportation (Medical): No     Lack of Transportation (Non-Medical): No   Physical Activity: Insufficiently Active (4/29/2024)    Exercise Vital Sign     Days of Exercise per Week: 1 day     Minutes of Exercise per Session: 30 min   Stress: Stress Concern Present (4/29/2024)    [employer] - Occupational Stress Questionnaire     Feeling of Stress : Rather much   Housing Stability: High Risk (4/29/2024)    Housing Stability Vital Sign     Unable to Pay for Housing in the Last Year: Yes       Past Surgical History:   Procedure Laterality Date    BREAST LUMPECTOMY      OPEN REDUCTION AND INTERNAL FIXATION (ORIF) OF INJURY OF ANKLE Right 4/27/2024    Procedure: ORIF, ANKLE;  Surgeon: Ivan Mcdonnell MD;  Location: [facility];  Service: Orthopedics;  Laterality: Right;       Review of Systems      Objective:   There were no vitals taken for this visit.    X-Ray Ankle Complete Right  Narrative: EXAM: XR ANKLE COMPLETE 3 VIEW RIGHT    CLINICAL HISTORY: Pain    FINDINGS:  Compared to 07/22/2024.    Postsurgical changes associated with internal fixation of fractures of the distal right tibia and fibula are visible.  The fractures are healed.  No acute fracture, dislocation or asymmetrical soft tissue swelling is identified.  The ankle mortise is intact.  Impression:  Status post internal fixation of old, healed fractures of the distal right tibia and fibula.  No acute abnormalities are appreciated.    Finalized on: 9/13/2024 12:02 PM By:  Lawson  Shanon  [facility]# [id number]      2024-09-13 12:04:[id number]    [facility]      Physical Exam  LOWER EXTREMITY PHYSICAL EXAMINATION    VASCULAR: The right DP pulse is 2/4 and the left DP is 2/4. The right PT pulse is 2/4 and the left PT pulse is 2/4. Proximal to distal, warm to warm. No dependent rubor or elevation palor is noted. Capillary refill time is less than 3 seconds. Hair growth is appreciated to the dorsal foot and digits.    NEUROLOGY: Proprioception is intact, bilateral. Sensation to light touch is intact. Negative Tinel's Sign and negative Valleix Sign. No neurological sensations with compression of the area of Kothari's Nerve in the area of the Abductor Hallucis muscle belly.    ORTHOPEDIC:  Moderate tenderness to palpation of the area around the plantar medial calcaneal tubercle on the right and left foot. Pains are characterized as sharp and stabbing-like with direct palpation of the area. There is also mild pain to palpation of the immediate plantar aspect of the heel, and no pains to the lateral band of the fascia. No edema is noted. No fullness is noted. No pains or defects are noted within the plantar fascia at the arch. No plantar fibromas are noted. No defects are noted within the ligament. Dorsiflexion of the MTPJs with simultaneous palpation of the fascia at the arch, does worsen and exacerbate the pains. No pains with medial to lateral compression of the heel. Silfverskiold exam is positive for gastrocnemius equinus. Antalgic gait pattern is noted.     DERMATOLOGY: No ecchymosis is noted.  Skin is supple, dry and intact. Skin is supple.  No hyperkeratosis noted. No calluses.  No open wounds or ulcerations are noted.  No palpable plantar fibromas noted.    Assessment:     1. Plantar fasciitis of right foot    2. Plantar fasciitis of left foot    3. Bilateral foot pain      Plan:     Plantar fasciitis of right foot  -     Ambulatory referral/consult to Podiatry  -     Tendon Sheath: R plantar  fascia    Plantar fasciitis of left foot  -     Tendon Sheath: L plantar fascia    Bilateral foot pain    Other orders  -     naproxen (NAPROSYN) 500 MG tablet; Take 1 tablet (500 mg total) by mouth 2 (two) times daily. for 20 doses  Dispense: 20 tablet; Refill: 0        Thorough discussion is had with the patient today concerning the diagnosis, its etiology, and the treatment algorithm at present.    I explained to the patient that etiology and all treatment options for heel pain including rest,  ice messages, stretching exercises, strappings/tappings, NSAID's, injections, new shoegear with orthotic inserts, and/or surgical treatment. I also discussed a possible injection of steroid to help calm down the inflammation sooner. I expressed the importance of wearing the orthotics in culmination of other treatment modalities. Patient agreed to stretching exercises and inserts. I gave written and verbal instructions on heel cord stretching and this was demonstrated for the patient. Patient expressed understanding and had the patient teach back the instructions.  Patient instructed on adequate icing techniques which should be done for acute pain and inflammation.    Discussed the importance of stretching to the posterior muscle groups of the gastrocnemius and the soleus.  A stretching sheet was provided to the patient in conjunction with a Thera-Band.  I do recommend patient perform stretching exercises 4-6 times per day and holding the stretches for approximately 15-30 seconds apiece.  We discussed importance of stretching as relates to lengthening the posterior muscle group which can decrease drain on the posterior aspect of the heel as well as the plantar aspect of the heel.  This will also decrease pain associated with post static dyskinesia.  Teach back mechanism was performed with the patient demonstrating the stretching exercises.     Patient relates interest in steroid injection at this time.  Did discuss risk of  steroid use as relates to increased anxiety, insomnia, post injection steroid flares, elevated blood sugars, swelling, pigment/skin changes.  Patient relates understanding.  Recommend use of anti-inflammatories with icing to decrease risk of a post-injection steroid flare.          Future Appointments   Date Time Provider Department Center   12/2/2024  2:45 PM Indira Gamble DPM [facility]

## 2024-11-18 NOTE — PROCEDURES
Tendon Sheath: L plantar fascia    Date/Time: 11/18/2024 1:30 PM    Performed by: Indira Gamble DPM  Authorized by: Indira Gamble DPM    Consent Done?:  Yes (Verbal)  Indications:  Pain  Site marked: the procedure site was marked    Timeout: prior to procedure the correct patient, procedure, and site was verified    Prep: patient was prepped and draped in usual sterile fashion      Local anesthesia used?: Yes    Local anesthetic: 0.75% Marcaine plain.  Anesthetic total (ml):  1    Location:  Foot  Site:  L plantar fascia  Ultrasonic guidance for needle placement?: No    Needle size:  25 G  Approach:  Medial  Medications:  40 mg triamcinolone acetonide 40 mg/mL; 4 mg dexAMETHasone 4 mg/mL  Patient tolerance:  Patient tolerated the procedure well with no immediate complications

## 2024-11-18 NOTE — PROCEDURES
Tendon Sheath: R plantar fascia    Date/Time: 11/18/2024 1:30 PM    Performed by: Indira Gamble DPM  Authorized by: Indira Gamble DPM    Consent Done?:  Yes (Verbal)  Indications:  Pain  Site marked: the procedure site was marked    Timeout: prior to procedure the correct patient, procedure, and site was verified    Prep: patient was prepped and draped in usual sterile fashion      Local anesthesia used?: Yes    Local anesthetic: 0.75% Marcaine plain.  Anesthetic total (ml):  1    Location:  Foot  Site:  R plantar fascia  Ultrasonic guidance for needle placement?: No    Needle size:  25 G  Approach:  Medial  Medications:  40 mg triamcinolone acetonide 40 mg/mL; 4 mg dexAMETHasone 4 mg/mL  Patient tolerance:  Patient tolerated the procedure well with no immediate complications

## 2024-12-23 ENCOUNTER — OUTSIDE PLACE OF SERVICE (OUTPATIENT)
Dept: HEPATOLOGY | Facility: CLINIC | Age: 34
End: 2024-12-23

## 2025-01-27 RX ORDER — NAPROXEN 500 MG/1
TABLET ORAL
Qty: 20 TABLET | Refills: 0 | Status: SHIPPED | OUTPATIENT
Start: 2025-01-27

## 2025-01-30 RX ORDER — HYDROXYZINE HYDROCHLORIDE 25 MG/1
25 TABLET, FILM COATED ORAL 3 TIMES DAILY PRN
Qty: 90 TABLET | Refills: 1 | Status: SHIPPED | OUTPATIENT
Start: 2025-01-30

## 2025-01-30 NOTE — TELEPHONE ENCOUNTER
Refill Routing Note   Medication(s) are not appropriate for processing by [facility] for the following reason(s):        Outside of protocol    [facility] action(s):  Route               Appointments  past 12m or future 3m with PCP    Date Provider   Last Visit   10/1/2024 Norma Shine MD   Next Visit   Visit date not found Norma Shine MD   ED visits in past 90 days: 0        Note composed:3:53 PM 01/30/2025

## 2025-04-06 RX ORDER — TRAZODONE HYDROCHLORIDE 50 MG/1
50 TABLET ORAL NIGHTLY
Qty: 90 TABLET | Refills: 1 | Status: SHIPPED | OUTPATIENT
Start: 2025-04-06

## 2025-04-06 NOTE — TELEPHONE ENCOUNTER
No care due was identified.  [facility] Embedded Care Due Messages. Reference number: [id number].   4/06/2025 11:28:59 AM CDT

## 2025-04-07 NOTE — TELEPHONE ENCOUNTER
Refill Decision Note   Esther Romo  is requesting a refill authorization.  Brief Assessment and Rationale for Refill:  Approve     Medication Therapy Plan:         Comments:     Note composed:8:19 PM 04/06/2025

## 2025-04-29 ENCOUNTER — OFFICE VISIT (OUTPATIENT)
Dept: PODIATRY | Facility: CLINIC | Age: 35
End: 2025-04-29
Payer: COMMERCIAL

## 2025-04-29 DIAGNOSIS — M72.2 PLANTAR FASCIITIS OF RIGHT FOOT: Primary | ICD-10-CM

## 2025-04-29 DIAGNOSIS — M72.2 PLANTAR FASCIITIS OF LEFT FOOT: ICD-10-CM

## 2025-04-29 PROCEDURE — 99999 PR PBB SHADOW E&M-EST. PATIENT-LVL III: CPT | Mod: PBBFAC,,, | Performed by: PODIATRIST

## 2025-04-29 RX ORDER — TRIAMCINOLONE ACETONIDE 40 MG/ML
40 INJECTION, SUSPENSION INTRA-ARTICULAR; INTRAMUSCULAR
Status: DISCONTINUED | OUTPATIENT
Start: 2025-04-29 | End: 2025-04-29 | Stop reason: HOSPADM

## 2025-04-29 RX ORDER — DEXAMETHASONE SODIUM PHOSPHATE 4 MG/ML
4 INJECTION, SOLUTION INTRA-ARTICULAR; INTRALESIONAL; INTRAMUSCULAR; INTRAVENOUS; SOFT TISSUE
Status: DISCONTINUED | OUTPATIENT
Start: 2025-04-29 | End: 2025-04-29 | Stop reason: HOSPADM

## 2025-04-29 RX ADMIN — DEXAMETHASONE SODIUM PHOSPHATE 4 MG: 4 INJECTION, SOLUTION INTRA-ARTICULAR; INTRALESIONAL; INTRAMUSCULAR; INTRAVENOUS; SOFT TISSUE at 08:04

## 2025-04-29 RX ADMIN — TRIAMCINOLONE ACETONIDE 40 MG: 40 INJECTION, SUSPENSION INTRA-ARTICULAR; INTRAMUSCULAR at 08:04

## 2025-04-29 NOTE — PROCEDURES
Tendon Sheath: L plantar fascia    Date/Time: 4/29/2025 8:45 AM    Performed by: Indira Gamble DPM  Authorized by: Indira Gamble DPM    Consent Done?:  Yes (Verbal)  Indications:  Pain  Site marked: the procedure site was marked    Timeout: prior to procedure the correct patient, procedure, and site was verified    Prep: patient was prepped and draped in usual sterile fashion      Local anesthesia used?: Yes    Local anesthetic: 0.75% Marcaine plain.  Anesthetic total (ml):  1    Location:  Foot  Site:  L plantar fascia  Ultrasonic guidance for needle placement?: No    Needle size:  25 G  Approach:  Medial  Medications:  40 mg triamcinolone acetonide 40 mg/mL; 4 mg dexAMETHasone 4 mg/mL  Patient tolerance:  Patient tolerated the procedure well with no immediate complications

## 2025-04-29 NOTE — PROGRESS NOTES
Subjective:       Patient ID: Esther Romo is a 35 y.o. adult.    Chief Complaint: Plantar Fasciitis (Bilateral plantar fasciitis: c/o pain rate 5/10, pt is nondiabetic, pt states of getting better within the last two days, did stretches but does want steroid injection. )      HPI: Esther Romo presents to clinic today to follow-up on plantar fasciitis to the right and left foot.  States attempted to utilize orthotics and performing stretching exercises.  States some mild to minimal improvement.  Continues to state post static dyskinesia with 5/10 pain. States walking and standing causes and/or exacerbates the symptoms. Patient has had 1 corticosteroid injection(s) prior and noted a decrease in her symptoms.  She does admit, however, that she did decrease her amount of stretching. Patient's Primary Care Provider is Norma Shine MD.     Review of patient's allergies indicates:  No Known Allergies    Past Medical History:   Diagnosis Date    Alcohol use, unspecified with unspecified alcohol-induced disorder     Anxiety     Hypertension     Miscarriage        Family History   Problem Relation Name Age of Onset    No Known Problems Mother      No Known Problems Father         Social History[1]    Past Surgical History:   Procedure Laterality Date    BREAST LUMPECTOMY      OPEN REDUCTION AND INTERNAL FIXATION (ORIF) OF INJURY OF ANKLE Right 4/27/2024    Procedure: ORIF, ANKLE;  Surgeon: Ivan Mcdonnell MD;  Location: [facility];  Service: Orthopedics;  Laterality: Right;       Review of Systems      Objective:   There were no vitals taken for this visit.    X-Ray Ankle Complete Right  Narrative: EXAM: XR ANKLE COMPLETE 3 VIEW RIGHT    CLINICAL HISTORY: Pain    FINDINGS:  Compared to 07/22/2024.    Postsurgical changes associated with internal fixation of fractures of the distal right tibia and fibula are visible.  The fractures are healed.  No acute fracture, dislocation or asymmetrical soft  tissue swelling is identified.  The ankle mortise is intact.  Impression:  Status post internal fixation of old, healed fractures of the distal right tibia and fibula.  No acute abnormalities are appreciated.    Finalized on: 9/13/2024 12:02 PM By:  Lawson Claudio  R# [id number]      2024-09-13 12:04:[id number]    [facility]      Physical Exam  LOWER EXTREMITY PHYSICAL EXAMINATION    VASCULAR: The right DP pulse is 2/4 and the left DP is 2/4. The right PT pulse is 2/4 and the left PT pulse is 2/4. Proximal to distal, warm to warm. No dependent rubor or elevation palor is noted. Capillary refill time is less than 3 seconds. Hair growth is appreciated to the dorsal foot and digits.    NEUROLOGY: Proprioception is intact, bilateral. Sensation to light touch is intact. Negative Tinel's Sign and negative Valleix Sign. No neurological sensations with compression of the area of Kothari's Nerve in the area of the Abductor Hallucis muscle belly.    ORTHOPEDIC:  Moderate tenderness to palpation of the area around the plantar medial calcaneal tubercle on the right and left foot. Pains are characterized as sharp and stabbing-like with direct palpation of the area. There is also mild pain to palpation of the immediate plantar aspect of the heel, and no pains to the lateral band of the fascia. No edema is noted. No fullness is noted. No pains or defects are noted within the plantar fascia at the arch. No plantar fibromas are noted. No defects are noted within the ligament. Dorsiflexion of the MTPJs with simultaneous palpation of the fascia at the arch, does worsen and exacerbate the pains. No pains with medial to lateral compression of the heel. Equinus contracture is noted. Antalgic gait pattern is noted.     DERMATOLOGY: No ecchymosis is noted.  Skin is supple, dry and intact. Skin is supple.  No hyperkeratosis noted. No calluses.  No open wounds or ulcerations are noted.  No palpable plantar fibromas noted.    Assessment:     1.  "Plantar fasciitis of right foot    2. Plantar fasciitis of left foot      Plan:     Plantar fasciitis of right foot    Plantar fasciitis of left foot        Thorough discussion is had with the patient today concerning the diagnosis, its etiology, and the treatment algorithm at present.    Continue to recommend the  importance of stretching to the posterior muscle groups of the gastrocnemius and the soleus.  A stretching sheet was provided to the patient in conjunction with a Thera-Band.  I do recommend patient perform stretching exercises 4-6 times per day and holding the stretches for approximately 15-30 seconds apiece.  We discussed importance of stretching as relates to lengthening the posterior muscle group which can decrease drain on the posterior aspect of the heel as well as the plantar aspect of the heel.  This will also decrease pain associated with post static dyskinesia.  Teach back mechanism was performed with the patient demonstrating the stretching exercises.    Did discussed possible physical therapy versus steroid injection into the affected foot.  Patient relates interest in steroid injection at this time.  Did discuss risk of steroid use as relates to increased anxiety, insomnia, post injection steroid flares, elevated blood sugars, swelling.  Patient lytes understanding.  Recommend use of anti-inflammatories with icing to decrease risk of a post-injection steroid flare.      No future appointments.        [1]   Social History  Socioeconomic History    Marital status: Single   Tobacco Use    Smoking status: Some Days     Current packs/day: 0.50     Types: Cigarettes, Vaping with nicotine    Smokeless tobacco: Never   Substance and Sexual Activity    Alcohol use: Yes     Comment: "I'm an alcoholic"    Drug use: Yes     Types: Cocaine, Marijuana     Comment: ocassionally     Social Drivers of Health     Financial Resource Strain: Medium Risk (4/29/2024)    Overall Financial Resource Strain (CARDIA) "     Difficulty of Paying Living Expenses: Somewhat hard   Food Insecurity: Food Insecurity Present (4/29/2024)    Hunger Vital Sign     Worried About Running Out of Food in the Last Year: Sometimes true     Ran Out of Food in the Last Year: Never true   Transportation Needs: No Transportation Needs (4/29/2024)    PRAPARE - Transportation     Lack of Transportation (Medical): No     Lack of Transportation (Non-Medical): No   Physical Activity: Insufficiently Active (4/29/2024)    Exercise Vital Sign     Days of Exercise per Week: 1 day     Minutes of Exercise per Session: 30 min   Stress: Stress Concern Present (4/29/2024)    [employer] - Occupational Stress Questionnaire     Feeling of Stress : Rather much   Housing Stability: High Risk (4/29/2024)    Housing Stability Vital Sign     Unable to Pay for Housing in the Last Year: Yes

## 2025-04-29 NOTE — PROCEDURES
Tendon Sheath: R plantar fascia    Date/Time: 4/29/2025 8:45 AM    Performed by: Indira Gamlbe DPM  Authorized by: Indira Gamble DPM    Consent Done?:  Yes (Verbal)  Indications:  Pain  Site marked: the procedure site was marked    Timeout: prior to procedure the correct patient, procedure, and site was verified    Prep: patient was prepped and draped in usual sterile fashion      Local anesthesia used?: Yes    Local anesthetic: 0.75% Marcaine plain.  Anesthetic total (ml):  1    Location:  Foot  Site:  R plantar fascia  Ultrasonic guidance for needle placement?: No    Needle size:  25 G  Approach:  Medial  Medications:  40 mg triamcinolone acetonide 40 mg/mL; 4 mg dexAMETHasone 4 mg/mL  Patient tolerance:  Patient tolerated the procedure well with no immediate complications

## 2025-05-03 NOTE — TELEPHONE ENCOUNTER
No care due was identified.  [facility] Embedded Care Due Messages. Reference number: [id number].   5/03/2025 3:40:36 PM CDT

## 2025-05-04 RX ORDER — VENLAFAXINE HYDROCHLORIDE 150 MG/1
150 CAPSULE, EXTENDED RELEASE ORAL
Qty: 90 CAPSULE | Refills: 1 | Status: SHIPPED | OUTPATIENT
Start: 2025-05-04

## 2025-05-04 RX ORDER — HYDROXYZINE HYDROCHLORIDE 25 MG/1
25 TABLET, FILM COATED ORAL
Qty: 90 TABLET | Refills: 0 | Status: SHIPPED | OUTPATIENT
Start: 2025-05-04

## 2025-05-04 NOTE — TELEPHONE ENCOUNTER
Refill Routing Note   Medication(s) are not appropriate for processing by [facility] for the following reason(s):        Outside of protocol    [facility] action(s):  Approve  Route               Appointments  past 12m or future 3m with PCP    Date Provider   Last Visit   10/1/2024 Norma Shine MD   Next Visit   Visit date not found Norma Shine MD   ED visits in past 90 days: 0        Note composed:12:38 PM 05/04/2025

## 2025-05-12 RX ORDER — LISINOPRIL AND HYDROCHLOROTHIAZIDE 12.5; 2 MG/1; MG/1
1 TABLET ORAL EVERY MORNING
Qty: 90 TABLET | Refills: 1 | Status: SHIPPED | OUTPATIENT
Start: 2025-05-12

## 2025-05-12 NOTE — TELEPHONE ENCOUNTER
No care due was identified.  [facility] Embedded Care Due Messages. Reference number: [id number].   5/12/2025 9:12:48 AM CDT

## 2025-05-13 NOTE — TELEPHONE ENCOUNTER
Refill Decision Note   Esther Romo  is requesting a refill authorization.    Brief Assessment and Rationale for Refill:   Approve       Medication Therapy Plan:         Comments:     Note composed:8:10 PM 05/12/2025

## 2025-06-02 RX ORDER — HYDROXYZINE HYDROCHLORIDE 25 MG/1
25 TABLET, FILM COATED ORAL
Qty: 90 TABLET | Refills: 0 | Status: SHIPPED | OUTPATIENT
Start: 2025-06-02 | End: 2025-06-03 | Stop reason: SDUPTHER

## 2025-06-03 ENCOUNTER — OFFICE VISIT (OUTPATIENT)
Dept: FAMILY MEDICINE | Facility: CLINIC | Age: 35
End: 2025-06-03
Payer: COMMERCIAL

## 2025-06-03 ENCOUNTER — RESULTS FOLLOW-UP (OUTPATIENT)
Dept: FAMILY MEDICINE | Facility: CLINIC | Age: 35
End: 2025-06-03

## 2025-06-03 ENCOUNTER — APPOINTMENT (OUTPATIENT)
Dept: LAB | Facility: HOSPITAL | Age: 35
End: 2025-06-03
Attending: FAMILY MEDICINE
Payer: COMMERCIAL

## 2025-06-03 VITALS
OXYGEN SATURATION: 97 % | WEIGHT: 202.5 LBS | BODY MASS INDEX: 35.88 KG/M2 | SYSTOLIC BLOOD PRESSURE: 130 MMHG | DIASTOLIC BLOOD PRESSURE: 70 MMHG | HEIGHT: 63 IN | HEART RATE: 97 BPM

## 2025-06-03 DIAGNOSIS — F32.A DEPRESSION, UNSPECIFIED DEPRESSION TYPE: ICD-10-CM

## 2025-06-03 DIAGNOSIS — I10 PRIMARY HYPERTENSION: ICD-10-CM

## 2025-06-03 DIAGNOSIS — F10.11 ALCOHOL ABUSE, IN REMISSION: ICD-10-CM

## 2025-06-03 DIAGNOSIS — I10 PRIMARY HYPERTENSION: Primary | ICD-10-CM

## 2025-06-03 DIAGNOSIS — G47.00 INSOMNIA, UNSPECIFIED TYPE: ICD-10-CM

## 2025-06-03 LAB
ABSOLUTE EOSINOPHIL (OHS): 0.05 K/UL
ABSOLUTE MONOCYTE (OHS): 0.64 K/UL (ref 0.3–1)
ABSOLUTE NEUTROPHIL COUNT (OHS): 6.42 K/UL (ref 1.8–7.7)
ALBUMIN SERPL BCP-MCNC: 3.9 G/DL (ref 3.5–5.2)
ALP SERPL-CCNC: 85 UNIT/L (ref 40–150)
ALT SERPL W/O P-5'-P-CCNC: 17 UNIT/L (ref 10–44)
ANION GAP (OHS): 8 MMOL/L (ref 8–16)
AST SERPL-CCNC: 16 UNIT/L (ref 11–45)
BASOPHILS # BLD AUTO: 0.08 K/UL
BASOPHILS NFR BLD AUTO: 0.9 %
BILIRUB SERPL-MCNC: 0.3 MG/DL (ref 0.1–1)
BUN SERPL-MCNC: 12 MG/DL (ref 6–20)
CALCIUM SERPL-MCNC: 9.3 MG/DL (ref 8.7–10.5)
CHLORIDE SERPL-SCNC: 102 MMOL/L (ref 95–110)
CHOLEST SERPL-MCNC: 231 MG/DL (ref 120–199)
CHOLEST/HDLC SERPL: 2.8 {RATIO} (ref 2–5)
CO2 SERPL-SCNC: 27 MMOL/L (ref 23–29)
CREAT SERPL-MCNC: 0.7 MG/DL (ref 0.5–1.4)
EAG (OHS): 105 MG/DL (ref 68–131)
ERYTHROCYTE [DISTWIDTH] IN BLOOD BY AUTOMATED COUNT: 14.6 % (ref 11.5–14.5)
GFR SERPLBLD CREATININE-BSD FMLA CKD-EPI: >60 ML/MIN/1.73/M2
GLUCOSE SERPL-MCNC: 109 MG/DL (ref 70–110)
HBA1C MFR BLD: 5.3 % (ref 4–5.6)
HCT VFR BLD AUTO: 43.9 % (ref 40–54)
HDLC SERPL-MCNC: 82 MG/DL (ref 40–75)
HDLC SERPL: 35.5 % (ref 20–50)
HGB BLD-MCNC: 13.7 GM/DL (ref 14–18)
IMM GRANULOCYTES # BLD AUTO: 0.03 K/UL (ref 0–0.04)
IMM GRANULOCYTES NFR BLD AUTO: 0.3 % (ref 0–0.5)
LDLC SERPL CALC-MCNC: 132.8 MG/DL (ref 63–159)
LYMPHOCYTES # BLD AUTO: 1.5 K/UL (ref 1–4.8)
MCH RBC QN AUTO: 27.7 PG (ref 27–31)
MCHC RBC AUTO-ENTMCNC: 31.2 G/DL (ref 32–36)
MCV RBC AUTO: 89 FL (ref 82–98)
NONHDLC SERPL-MCNC: 149 MG/DL
NUCLEATED RBC (/100WBC) (OHS): 0 /100 WBC
PLATELET # BLD AUTO: 461 K/UL (ref 150–450)
PMV BLD AUTO: 10.3 FL (ref 9.2–12.9)
POTASSIUM SERPL-SCNC: 4.7 MMOL/L (ref 3.5–5.1)
PROT SERPL-MCNC: 7.6 GM/DL (ref 6–8.4)
RBC # BLD AUTO: 4.95 M/UL (ref 4.6–6.2)
RELATIVE EOSINOPHIL (OHS): 0.6 %
RELATIVE LYMPHOCYTE (OHS): 17.2 % (ref 18–48)
RELATIVE MONOCYTE (OHS): 7.3 % (ref 4–15)
RELATIVE NEUTROPHIL (OHS): 73.7 % (ref 38–73)
SODIUM SERPL-SCNC: 137 MMOL/L (ref 136–145)
TRIGL SERPL-MCNC: 81 MG/DL (ref 30–150)
WBC # BLD AUTO: 8.72 K/UL (ref 3.9–12.7)

## 2025-06-03 PROCEDURE — 3078F DIAST BP <80 MM HG: CPT | Mod: CPTII,S$GLB,, | Performed by: FAMILY MEDICINE

## 2025-06-03 PROCEDURE — 99999 PR PBB SHADOW E&M-EST. PATIENT-LVL III: CPT | Mod: PBBFAC,,, | Performed by: FAMILY MEDICINE

## 2025-06-03 PROCEDURE — 4010F ACE/ARB THERAPY RXD/TAKEN: CPT | Mod: CPTII,S$GLB,, | Performed by: FAMILY MEDICINE

## 2025-06-03 PROCEDURE — 1159F MED LIST DOCD IN RCRD: CPT | Mod: CPTII,S$GLB,, | Performed by: FAMILY MEDICINE

## 2025-06-03 PROCEDURE — G2211 COMPLEX E/M VISIT ADD ON: HCPCS | Mod: S$GLB,,, | Performed by: FAMILY MEDICINE

## 2025-06-03 PROCEDURE — 99214 OFFICE O/P EST MOD 30 MIN: CPT | Mod: S$GLB,,, | Performed by: FAMILY MEDICINE

## 2025-06-03 PROCEDURE — 3075F SYST BP GE 130 - 139MM HG: CPT | Mod: CPTII,S$GLB,, | Performed by: FAMILY MEDICINE

## 2025-06-03 PROCEDURE — 80053 COMPREHEN METABOLIC PANEL: CPT

## 2025-06-03 PROCEDURE — 83036 HEMOGLOBIN GLYCOSYLATED A1C: CPT

## 2025-06-03 PROCEDURE — 85025 COMPLETE CBC W/AUTO DIFF WBC: CPT

## 2025-06-03 PROCEDURE — 80061 LIPID PANEL: CPT

## 2025-06-03 PROCEDURE — 3008F BODY MASS INDEX DOCD: CPT | Mod: CPTII,S$GLB,, | Performed by: FAMILY MEDICINE

## 2025-06-03 PROCEDURE — 36415 COLL VENOUS BLD VENIPUNCTURE: CPT | Mod: PN

## 2025-06-03 RX ORDER — BUSPIRONE HYDROCHLORIDE 10 MG/1
10 TABLET ORAL 2 TIMES DAILY
Qty: 180 TABLET | Refills: 4 | Status: SHIPPED | OUTPATIENT
Start: 2025-06-03

## 2025-06-03 RX ORDER — LISINOPRIL AND HYDROCHLOROTHIAZIDE 12.5; 2 MG/1; MG/1
1 TABLET ORAL EVERY MORNING
Qty: 90 TABLET | Refills: 1 | Status: SHIPPED | OUTPATIENT
Start: 2025-06-03

## 2025-06-03 RX ORDER — VENLAFAXINE HYDROCHLORIDE 150 MG/1
150 CAPSULE, EXTENDED RELEASE ORAL DAILY
Qty: 90 CAPSULE | Refills: 3 | Status: SHIPPED | OUTPATIENT
Start: 2025-06-03

## 2025-06-03 RX ORDER — HYDROXYZINE HYDROCHLORIDE 25 MG/1
25 TABLET, FILM COATED ORAL 2 TIMES DAILY PRN
Qty: 60 TABLET | Refills: 4 | Status: SHIPPED | OUTPATIENT
Start: 2025-06-03

## 2025-06-03 RX ORDER — KETOROLAC TROMETHAMINE 10 MG/1
10 TABLET, FILM COATED ORAL ONCE
COMMUNITY

## 2025-06-03 RX ORDER — TRAZODONE HYDROCHLORIDE 50 MG/1
50 TABLET ORAL NIGHTLY
Qty: 90 TABLET | Refills: 3 | Status: SHIPPED | OUTPATIENT
Start: 2025-06-03

## 2025-06-10 ENCOUNTER — PATIENT MESSAGE (OUTPATIENT)
Dept: FAMILY MEDICINE | Facility: CLINIC | Age: 35
End: 2025-06-10
Payer: COMMERCIAL

## 2025-07-02 RX ORDER — HYDROXYZINE HYDROCHLORIDE 25 MG/1
25 TABLET, FILM COATED ORAL
Qty: 90 TABLET | Refills: 0 | Status: SHIPPED | OUTPATIENT
Start: 2025-07-02

## 2025-07-02 NOTE — TELEPHONE ENCOUNTER
Refill Routing Note   Medication(s) are not appropriate for processing by [facility] for the following reason(s):        Outside of protocol    [facility] action(s):  Route               Appointments  past 12m or future 3m with PCP    Date Provider   Last Visit   6/3/2025 Norma Shine MD   Next Visit   Visit date not found Norma Shine MD   ED visits in past 90 days: 0        Note composed:9:17 AM 07/02/2025

## 2025-08-14 ENCOUNTER — PATIENT MESSAGE (OUTPATIENT)
Dept: FAMILY MEDICINE | Facility: CLINIC | Age: 35
End: 2025-08-14
Payer: COMMERCIAL

## 2025-08-27 ENCOUNTER — TELEPHONE (OUTPATIENT)
Dept: FAMILY MEDICINE | Facility: CLINIC | Age: 35
End: 2025-08-27
Payer: COMMERCIAL

## (undated) DEVICE — GLOVE SENSICARE PI GRN 8

## (undated) DEVICE — SUT VICRYL 3-0 27 SH

## (undated) DEVICE — DRAPE THREE-QTR REINF 53X77IN

## (undated) DEVICE — UNDERPAD DELUXE FLUFF 30X30IN

## (undated) DEVICE — Device

## (undated) DEVICE — PAD CAST SPECIALIST STRL 4

## (undated) DEVICE — DRESSING XEROFORM NONADH 1X8IN

## (undated) DEVICE — APPLICATOR CHLORAPREP ORN 26ML

## (undated) DEVICE — GUIDE DRILL AO T10 3.5X30MM

## (undated) DEVICE — MANIFOLD 4 PORT

## (undated) DEVICE — DRAPE T EXTRM SURG 121X128X90

## (undated) DEVICE — SCREW BONE NON LOCK 3.5X20MM
Type: IMPLANTABLE DEVICE | Site: ANKLE | Status: NON-FUNCTIONAL
Removed: 2024-04-27

## (undated) DEVICE — DRAPE MOBILE C-ARM

## (undated) DEVICE — PACK BASIC SETUP SC BR

## (undated) DEVICE — YANKAUER FLEX NO VENT REG CAP

## (undated) DEVICE — BLADE SURG CARBON STEEL #10

## (undated) DEVICE — BANDAGE ROLL COTTN 4.5INX4.1YD

## (undated) DEVICE — DRAPE C-ARMOR EQUIPMENT COVER

## (undated) DEVICE — GUIDE DRILL AO 2.6X30MM

## (undated) DEVICE — BIT DRILL AO SPEEDGUIDE 2X135M

## (undated) DEVICE — GAUZE CNFRM STRL 4INX4.1YD

## (undated) DEVICE — NDL ECLIPSE SAFETY 23G 1.5IN

## (undated) DEVICE — PAD ABDOMINAL STERILE 8X10IN

## (undated) DEVICE — K-WIRE TROCAR POINT 1.6X150MM
Type: IMPLANTABLE DEVICE | Site: ANKLE | Status: NON-FUNCTIONAL
Removed: 2024-04-27

## (undated) DEVICE — OVERDRILL AO DIA 2.4MM X 122MM

## (undated) DEVICE — SUT 4-0 ETHILON 18 PS-2

## (undated) DEVICE — SYR LUER LOCK STERILE 10ML

## (undated) DEVICE — ELECTRODE REM PLYHSV RETURN 9

## (undated) DEVICE — BANDAGE ESMARK ELASTIC ST 4X9

## (undated) DEVICE — BIT DRILL AO 3.5X122MM

## (undated) DEVICE — COVER LIGHT HANDLE 80/CA

## (undated) DEVICE — SUT VICRYL CTD 2-0 GI 27 SH

## (undated) DEVICE — PAD CAST SPECIALIST STRL 6

## (undated) DEVICE — BIT DRILL AO 2.6X135MM SCALED